# Patient Record
Sex: MALE | Race: WHITE | Employment: OTHER | URBAN - METROPOLITAN AREA
[De-identification: names, ages, dates, MRNs, and addresses within clinical notes are randomized per-mention and may not be internally consistent; named-entity substitution may affect disease eponyms.]

---

## 2017-09-26 ENCOUNTER — HOSPITAL ENCOUNTER (EMERGENCY)
Age: 52
Discharge: HOME | End: 2017-09-26
Attending: EMERGENCY MEDICINE | Admitting: EMERGENCY MEDICINE
Payer: OTHER GOVERNMENT

## 2017-09-26 VITALS
OXYGEN SATURATION: 100 % | DIASTOLIC BLOOD PRESSURE: 101 MMHG | TEMPERATURE: 98 F | BODY MASS INDEX: 22.59 KG/M2 | RESPIRATION RATE: 16 BRPM | SYSTOLIC BLOOD PRESSURE: 159 MMHG | WEIGHT: 162 LBS | HEART RATE: 72 BPM

## 2017-09-26 DIAGNOSIS — K04.7 DENTAL ABSCESS: Primary | ICD-10-CM

## 2017-09-26 DIAGNOSIS — R03.0 ELEVATED BLOOD PRESSURE READING: ICD-10-CM

## 2017-09-26 PROCEDURE — 74011250637 HC RX REV CODE- 250/637: Performed by: EMERGENCY MEDICINE

## 2017-09-26 PROCEDURE — 99283 EMERGENCY DEPT VISIT LOW MDM: CPT

## 2017-09-26 RX ORDER — AMOXICILLIN 250 MG/1
1000 CAPSULE ORAL
Status: COMPLETED | OUTPATIENT
Start: 2017-09-26 | End: 2017-09-26

## 2017-09-26 RX ORDER — HYDROCODONE BITARTRATE AND ACETAMINOPHEN 5; 325 MG/1; MG/1
TABLET ORAL
Qty: 6 TAB | Refills: 0 | Status: SHIPPED | OUTPATIENT
Start: 2017-09-26 | End: 2018-03-26

## 2017-09-26 RX ORDER — AMOXICILLIN 875 MG/1
875 TABLET, FILM COATED ORAL 2 TIMES DAILY
Qty: 14 TAB | Refills: 0 | Status: SHIPPED | OUTPATIENT
Start: 2017-09-26 | End: 2017-10-03

## 2017-09-26 RX ADMIN — AMOXICILLIN 1000 MG: 250 CAPSULE ORAL at 13:32

## 2017-09-26 NOTE — ED PROVIDER NOTES
HPI Comments: 1:13 PM Fredy Ward is a 45 y.o. male who presents to the ED c/o left lower posterior dental pain x yesterday. The patient is also complaining of associated left jaw swelling. He reports a h/o tooth decay and notes a dental appointment through the [place] in January 2018. He remarks that he does not have dental insurance. He states that he has been applying crushed Asprin to his tooth w/ some relief. The patient denies fever, chills, trouble swallowing, sore throat, NV, and additional complaints or concerns. PCP:  Miracle Silva. Cherelle Zurita NP      The history is provided by the patient. Past Medical History:   Diagnosis Date    Psychiatric disorder     depression       History reviewed. No pertinent surgical history. History reviewed. No pertinent family history. Social History     Social History    Marital status:      Spouse name: N/A    Number of children: N/A    Years of education: N/A     Occupational History    Not on file. Social History Main Topics    Smoking status: Current Every Day Smoker     Packs/day: 1.00    Smokeless tobacco: Never Used    Alcohol use Not on file    Drug use: Not on file    Sexual activity: Not on file     Other Topics Concern    Not on file     Social History Narrative         ALLERGIES: Codeine    Review of Systems   Constitutional: Negative for fever. HENT: Positive for dental problem (L lower posterior ) and facial swelling (L jaw). Negative for trouble swallowing. All other systems reviewed and are negative. Vitals:    09/26/17 1312   BP: (!) 179/111   Pulse: 76   Resp: 16   Temp: 98 °F (36.7 °C)   SpO2: 100%   Weight: 73.5 kg (162 lb)            Physical Exam   Constitutional: He is oriented to person, place, and time. He appears well-developed and well-nourished. HENT:   Head: Normocephalic and atraumatic.    Mouth/Throat:       Uvula midline  No floor of mouth crepitus  Moderate L mandibular edema, no erythema, approx 2cm circular, no discrete flutuance   Neck: Neck supple. No JVD present. Musculoskeletal: He exhibits no edema. Neurological: He is alert and oriented to person, place, and time. Skin: Skin is warm and dry. No erythema. Psychiatric: Judgment normal.        MDM  Number of Diagnoses or Management Options  Dental abscess:   Elevated blood pressure reading:   Diagnosis management comments: No signs of serious infection, ludwigs. Pt tolerating secretions, afebrile. Will give abx, main meds, advised nsiads. Discussed return precautions with pt. ED Course       Procedures    Vitals:  Patient Vitals for the past 12 hrs:   Temp Pulse Resp BP SpO2   09/26/17 1312 98 °F (36.7 °C) 76 16 (!) 179/111 100 %     Pt noted to have elevated BP. Pt is asymptomatic and was referred to PCP for follow up. Disposition:  Diagnosis:   1. Dental abscess    2. Elevated blood pressure reading        Disposition: Discharge     Follow-up Information     Follow up With Details Comments Contact Info    [facility] EMERGENCY DEPT Go to As needed, If symptoms worsen [address] [place] [address] Call in 1 day ED visit follow up [address]  [phone]           Patient's Medications   Start Taking    AMOXICILLIN (AMOXIL) 875 MG TABLET    Take 1 Tab by mouth two (2) times a day for 7 days. HYDROCODONE-ACETAMINOPHEN (NORCO) 5-325 MG PER TABLET    Take 1-2 tablets PO every 4-6 hours as needed for pain control. If over the counter ibuprofen or acetaminophen was suggested, then only take the vicodin for pain not well controlled with the over the counter medication. Continue Taking    MULTIVITAMIN (ONE A DAY) TABLET    Take 1 tablet by mouth daily. SERTRALINE (ZOLOFT) 25 MG TABLET    Take  by mouth daily.    These Medications have changed    No medications on file   Stop Taking    OXYCODONE-ACETAMINOPHEN (PERCOCET) 5-325 MG PER TABLET    Take 1 Tab by mouth every four (4) hours as needed for Pain. Max Daily Amount: 6 Tabs. Scribe [id number] Ronald Andres acting as a scribe for and in the presence of Martine Lowry DO      September 26, 2017 at 1:17 PM       Provider Attestation:      I personally performed the services described in the documentation, reviewed the documentation, as recorded by the scribe in my presence, and it accurately and completely records my words and actions.  September 26, 2017 at 1:17 PM - Martine Lowry DO

## 2017-09-26 NOTE — DISCHARGE INSTRUCTIONS
Abscessed Tooth: Care Instructions  Your Care Instructions    An abscessed tooth is a tooth that has a pocket of pus in the tissues around it. Pus forms when the body tries to fight an infection caused by bacteria. If the pus cannot drain, it forms an abscess. An abscessed tooth can cause red, swollen gums and throbbing pain, especially when you chew. You may have a bad taste in your mouth and a fever, and your jaw may swell. Damage to the tooth, untreated tooth decay, or gum disease can cause an abscessed tooth. An abscessed tooth needs to be treated by a dental professional right away. If it is not treated, the infection could spread to other parts of your body. Your dentist will give you antibiotics to stop the infection. He or she may make a hole in the tooth or cut open (nicolasa) the abscess inside your mouth so that the infection can drain, which should relieve your pain. You may need to have a root canal treatment, which tries to save your tooth by taking out the infected pulp and replacing it with a healing medicine and/or a filling. If these treatments do not work, your tooth may have to be removed. Follow-up care is a key part of your treatment and safety. Be sure to make and go to all appointments, and call your doctor if you are having problems. It's also a good idea to know your test results and keep a list of the medicines you take. How can you care for yourself at home? · Reduce pain and swelling in your face and jaw by putting ice or a cold pack on the outside of your cheek for 10 to 20 minutes at a time. Put a thin cloth between the ice and your skin. · Take pain medicines exactly as directed. ¨ If the doctor gave you a prescription medicine for pain, take it as prescribed. ¨ If you are not taking a prescription pain medicine, ask your doctor if you can take an over-the-counter medicine. · Take your antibiotics as directed. Do not stop taking them just because you feel better.  You need to take the full course of antibiotics. To prevent tooth abscess  · Brush and floss every day, and have regular dental checkups. · Eat a healthy diet, and avoid sugary foods and drinks. · Do not smoke, use e-cigarettes with nicotine, or use spit tobacco. Tobacco and nicotine slow your ability to heal. Tobacco also increases your risk for gum disease and cancer of the mouth and throat. If you need help quitting, talk to your doctor about stop-smoking programs and medicines. These can increase your chances of quitting for good. When should you call for help? Call 911 anytime you think you may need emergency care. For example, call if:  · You have trouble breathing. Call your doctor now or seek immediate medical care if:  · You have new or worse symptoms of infection, such as:  ¨ Increased pain, swelling, warmth, or redness. ¨ Red streaks leading from the area. ¨ Pus draining from the area. ¨ A fever. Watch closely for changes in your health, and be sure to contact your doctor if:  · You do not get better as expected. Where can you learn more? Go to http://josé-denia.info/. Enter U940 in the search box to learn more about \"Abscessed Tooth: Care Instructions. \"  Current as of: September 19, 2016  Content Version: 11.3  © 8505-6587 [employer]. Care instructions adapted under license by [employer] (which disclaims liability or warranty for this information). If you have questions about a medical condition or this instruction, always ask your healthcare professional. [place] [address] any warranty or liability for your use of this information. Elevated Blood Pressure: Care Instructions  Your Care Instructions    Blood pressure is a measure of how hard the blood pushes against the walls of your arteries. It's normal for blood pressure to go up and down throughout the day. But if it stays up over time, you have high blood pressure.   Two numbers tell you your blood pressure. The first number is the systolic pressure. It shows how hard the blood pushes when your heart is pumping. The second number is the diastolic pressure. It shows how hard the blood pushes between heartbeats, when your heart is relaxed and filling with blood. An ideal blood pressure in adults is less than 120/80 (say \"120 over 80\"). High blood pressure is 140/90 or higher. You have high blood pressure if your top number is 140 or higher or your bottom number is 90 or higher, or both. The main test for high blood pressure is simple, fast, and painless. To diagnose high blood pressure, your doctor will test your blood pressure at different times. After testing your blood pressure, your doctor may ask you to test it again when you are home. If you are diagnosed with high blood pressure, you can work with your doctor to make a long-term plan to manage it. Follow-up care is a key part of your treatment and safety. Be sure to make and go to all appointments, and call your doctor if you are having problems. It's also a good idea to know your test results and keep a list of the medicines you take. How can you care for yourself at home? · Do not smoke. Smoking increases your risk for heart attack and stroke. If you need help quitting, talk to your doctor about stop-smoking programs and medicines. These can increase your chances of quitting for good. · Stay at a healthy weight. · Try to limit how much sodium you eat to less than 2,300 milligrams (mg) a day. Your doctor may ask you to try to eat less than 1,500 mg a day. · Be physically active. Get at least 30 minutes of exercise on most days of the week. Walking is a good choice. You also may want to do other activities, such as running, swimming, cycling, or playing tennis or team sports. · Avoid or limit alcohol. Talk to your doctor about whether you can drink any alcohol.   · Eat plenty of fruits, vegetables, and low-fat dairy products. Eat less saturated and total fats. · Learn how to check your blood pressure at home. When should you call for help? Call your doctor now or seek immediate medical care if:  · Your blood pressure is much higher than normal (such as 180/110 or higher). · You think high blood pressure is causing symptoms such as:  ¨ Severe headache. ¨ Blurry vision. Watch closely for changes in your health, and be sure to contact your doctor if:  · You do not get better as expected. Where can you learn more? Go to http://josé-denia.info/. Enter G166 in the search box to learn more about \"Elevated Blood Pressure: Care Instructions. \"  Current as of: April 3, 2017  Content Version: 11.3  © 8963-7517 [employer]. Care instructions adapted under license by [employer] (which disclaims liability or warranty for this information). If you have questions about a medical condition or this instruction, always ask your healthcare professional. [place] [address] any warranty or liability for your use of this information.

## 2018-03-26 ENCOUNTER — HOSPITAL ENCOUNTER (EMERGENCY)
Age: 53
Discharge: HOME | End: 2018-03-26
Attending: EMERGENCY MEDICINE
Payer: OTHER GOVERNMENT

## 2018-03-26 VITALS
RESPIRATION RATE: 16 BRPM | OXYGEN SATURATION: 98 % | DIASTOLIC BLOOD PRESSURE: 98 MMHG | HEIGHT: 71 IN | HEART RATE: 72 BPM | BODY MASS INDEX: 22.4 KG/M2 | TEMPERATURE: 97.6 F | SYSTOLIC BLOOD PRESSURE: 145 MMHG | WEIGHT: 160 LBS

## 2018-03-26 DIAGNOSIS — S05.01XA ABRASION OF RIGHT CORNEA, INITIAL ENCOUNTER: ICD-10-CM

## 2018-03-26 DIAGNOSIS — T15.91XA FOREIGN BODY OF RIGHT EYE, INITIAL ENCOUNTER: Primary | ICD-10-CM

## 2018-03-26 PROCEDURE — 74011000250 HC RX REV CODE- 250: Performed by: EMERGENCY MEDICINE

## 2018-03-26 PROCEDURE — 99283 EMERGENCY DEPT VISIT LOW MDM: CPT

## 2018-03-26 RX ORDER — TRAMADOL HYDROCHLORIDE 50 MG/1
50 TABLET ORAL
Qty: 12 TAB | Refills: 0 | Status: SHIPPED | OUTPATIENT
Start: 2018-03-26

## 2018-03-26 RX ORDER — ERYTHROMYCIN 5 MG/G
OINTMENT OPHTHALMIC
Qty: 3.5 G | Refills: 0 | Status: SHIPPED | OUTPATIENT
Start: 2018-03-26 | End: 2018-04-02

## 2018-03-26 RX ORDER — PROPARACAINE HYDROCHLORIDE 5 MG/ML
1 SOLUTION/ DROPS OPHTHALMIC
Status: COMPLETED | OUTPATIENT
Start: 2018-03-26 | End: 2018-03-26

## 2018-03-26 RX ADMIN — FLUORESCEIN SODIUM 1 STRIP: 1 STRIP OPHTHALMIC at 18:31

## 2018-03-26 RX ADMIN — PROPARACAINE HYDROCHLORIDE 1 DROP: 5 SOLUTION/ DROPS OPHTHALMIC at 18:31

## 2018-03-26 NOTE — ED PROVIDER NOTES
Patient is a 45 y.o. male presenting with foreign body in eye. The history is provided by the patient. Foreign Body in Eye    This is a new problem. Episode onset: 6 hours ago. The problem occurs constantly. The problem has been gradually worsening. The right eye is affected. The injury mechanism was a foreign body (While working thinks some dust or a piece of sheet rock blew into his right eye despite wearing goggles). The pain is at a severity of 5/10. There is no history of trauma to the eye. There is no known exposure to pink eye. He does not wear contacts (Wears glasses). Associated symptoms include discharge (Watery), foreign body sensation, eye redness and pain. Pertinent negatives include no numbness, no blurred vision, no decreased vision, no double vision, no photophobia, no nausea, no vomiting, no tingling, no weakness, no itching, no fever, no blindness, no head injury and no dizziness. He has tried eye drops and water for the symptoms. The treatment provided no relief. Past Medical History:   Diagnosis Date    Psychiatric disorder     depression       History reviewed. No pertinent surgical history. History reviewed. No pertinent family history. Social History     Social History    Marital status:      Spouse name: N/A    Number of children: N/A    Years of education: N/A     Occupational History    Not on file. Social History Main Topics    Smoking status: Current Every Day Smoker     Packs/day: 1.00    Smokeless tobacco: Never Used    Alcohol use Not on file    Drug use: Not on file    Sexual activity: Not on file     Other Topics Concern    Not on file     Social History Narrative         ALLERGIES: Codeine    Review of Systems   Constitutional: Negative for chills and fever. HENT: Negative for ear pain, rhinorrhea and sore throat. Eyes: Positive for pain, discharge (Watery) and redness.  Negative for blindness, blurred vision, double vision, photophobia, itching and visual disturbance. Respiratory: Negative for cough and shortness of breath. Cardiovascular: Negative for chest pain. Gastrointestinal: Negative for abdominal pain, constipation, diarrhea, nausea and vomiting. Genitourinary: Negative for dysuria. Musculoskeletal: Negative for neck pain and neck stiffness. Skin: Negative. Negative for itching. Neurological: Negative for dizziness, tingling, weakness, light-headedness, numbness and headaches. Psychiatric/Behavioral: Negative. All other systems reviewed and are negative. Vitals:    03/26/18 1810   BP: (!) 145/98   Pulse: 72   Resp: 16   Temp: 97.6 °F (36.4 °C)   SpO2: 98%   Weight: 72.6 kg (160 lb)   Height: 5' 11\" (1.803 m)            Physical Exam   Constitutional: He is oriented to person, place, and time. He appears well-developed and well-nourished. No distress. HENT:   Head: Normocephalic and atraumatic. Right Ear: Tympanic membrane, external ear and ear canal normal.   Left Ear: Tympanic membrane, external ear and ear canal normal.   Nose: Nose normal.   Mouth/Throat: Oropharynx is clear and moist and mucous membranes are normal.   Eyes: EOM and lids are normal. Pupils are equal, round, and reactive to light. Right eye exhibits no discharge and no exudate. Left eye exhibits no chemosis, no discharge, no exudate and no hordeolum. No foreign body present in the left eye. Right conjunctiva is injected. Right conjunctiva has no hemorrhage. Left conjunctiva is not injected. Left conjunctiva has no hemorrhage. No scleral icterus. Right eye exhibits normal extraocular motion and no nystagmus. Left eye exhibits normal extraocular motion and no nystagmus. Left eye:  EOMI. Non-icteric sclera. Normal conjunctiva. No foreign bodies noted. Noted visual acuity in triage. There are no signs of cellulitis nor periorbital cellulitis. Right eye:  EOMI. Non-icteric sclera. Mildly erythematous conjunctiva.   Small FB removed with q-tip, fluorescein uptake noted. Noted visual acuity in triage. There are no signs of cellulitis nor periorbital cellulitis. Neck: Normal range of motion. Cardiovascular: Normal rate, regular rhythm, normal heart sounds and intact distal pulses. Exam reveals no gallop and no friction rub. No murmur heard. Pulmonary/Chest: Effort normal and breath sounds normal.   Abdominal: Soft. Bowel sounds are normal. There is no tenderness. Musculoskeletal: Normal range of motion. Neurological: He is alert and oriented to person, place, and time. Skin: Skin is warm and dry. He is not diaphoretic. Psychiatric: He has a normal mood and affect. His behavior is normal. Judgment and thought content normal.   Nursing note and vitals reviewed. MDM  Number of Diagnoses or Management Options  Abrasion of right cornea, initial encounter: new and requires workup  Foreign body of right eye, initial encounter: new and requires workup  Diagnosis management comments: DDx: conjunctivitis, hordeolum/stye, blepharitis, corneal abrasion, subconj hemorrhage, herpes keratitis, eye injury, FB in eye, eye injury, HA, migraine, viral illness    IMPRESSION AND MEDICAL DECISION MAKING:  Based upon the patient's presentation with noted HPI and PE, along with the work up done in the emergency department, I believe that the patient is having noted corneal abrasion. DIAGNOSIS:  1. Corneal abrasion. SPECIFIC PATIENT INSTRUCTIONS FROM THE PHYSICIAN WHO TREATED YOU IN THE ER TODAY:  1. Return if any concerns or worsening of condition(s)  2. Erythromycin eye ointment as prescribed. 3. Take the tramadol for pain not controlled by over the counter ibuprofen. 4. FOLLOW UP APPOINTMENT:  Your ophthalmologist and your primary doctor in the next 2-4 days. Pt results have been reviewed with the patient and any family present. They have been counseled regarding diagnosis, treatment, and plan.  They verbally convey understanding and agreement of the signs, symptoms, diagnosis, treatment and prognosis and additionally agrees to follow up as discussed. They also agree with the care-plan and convey that all of their questions have been answered. I have also provided discharge instructions for them that include: educational information regarding their diagnosis and treatment, and list of reasons why they would want to return to the ED prior to their follow-up appointment, should their condition change. Martín Dawson PA-C  6:58 PM        Amount and/or Complexity of Data Reviewed  Tests in the medicine section of CPT®: ordered and reviewed  Review and summarize past medical records: yes  Discuss the patient with other providers: yes    Risk of Complications, Morbidity, and/or Mortality  Presenting problems: low  Diagnostic procedures: low  Management options: low    Patient Progress  Patient progress: stable        ED Course       Eye Stain    Date/Time: 3/26/2018 6:55 PM    Performed by: PA  Supervising provider: Dr. Mayra Tong    . Foreign body removed. Residual rust ring was not observed. Dressing used: antibiotic ointment (Rx)    Corneal abrasion was present on eyelid eversion. Right eye location: 12 o'clock  Cornea is clear. Anterior chamber is clear. Patient tolerance: Patient tolerated the procedure well with no immediate complications  My total time at bedside, performing this procedure was 1-15 minutes. Diagnosis:   1. Foreign body of right eye, initial encounter    2. Abrasion of right cornea, initial encounter          Disposition: Discharge to home.      Follow-up Information     Follow up With Details Comments Contact Info    [facility] EMERGENCY DEPT  As needed, If symptoms worsen [address],  Go in 2 days  Jethro Swanson 51      Francisco Olsen MD Go in 2 days  [address] [place] [address] Patient's Medications   Start Taking    ERYTHROMYCIN (ILOTYCIN) OPHTHALMIC OINTMENT    Apply half-inch ribbon to the inside of the lower eyelid of the affected eye 3-4 times a day for 7 days. TRAMADOL (ULTRAM) 50 MG TABLET    Take 1 Tab by mouth every six (6) hours as needed for Pain. Max Daily Amount: 200 mg. Continue Taking    LISINOPRIL PO    Take  by mouth. MULTIVITAMIN (ONE A DAY) TABLET    Take 1 tablet by mouth daily. SERTRALINE (ZOLOFT) 25 MG TABLET    Take  by mouth daily. SIMVASTATIN PO    Take  by mouth. These Medications have changed    No medications on file   Stop Taking    HYDROCODONE-ACETAMINOPHEN (NORCO) 5-325 MG PER TABLET    Take 1-2 tablets PO every 4-6 hours as needed for pain control. If over the counter ibuprofen or acetaminophen was suggested, then only take the vicodin for pain not well controlled with the over the counter medication.

## 2018-03-26 NOTE — ED NOTES
I performed a brief evaluation, including history and physical, of the patient here in triage and I have determined that pt will need further treatment and evaluation from the main side ER physician. I have placed initial orders to help in expediting patients care. March 26, 2018 at 6:10 PM - LUANNE Little        There were no vitals taken for this visit.

## 2018-03-26 NOTE — DISCHARGE INSTRUCTIONS
Object in the Eye: Care Instructions  Your Care Instructions  It is common for a speck of dirt or a small object, such as an eyelash or an insect, to get in the eye. Usually your tears wash the object out. But the speck can scratch the surface of the eye (cornea). If the eye surface is scratched, it can feel as if something is still in the eye. Most surface scratches are minor and heal on their own in a day or two. If the object was still in your eye, your doctor probably removed it during your exam. Sometimes these objects become stuck deep in the eye and require more treatment. For instance, a metal object may leave a rust ring. Follow-up care is a key part of your treatment and safety. Be sure to make and go to all appointments, and call your doctor if you are having problems. It's also a good idea to know your test results and keep a list of the medicines you take. How can you care for yourself at home? · The doctor probably used medicine to numb your eye. When it wears off in 30 to 60 minutes, your eye pain may come back. Take over-the-counter pain medicine, such as acetaminophen (Tylenol), ibuprofen (Advil, Motrin), or naproxen (Aleve), as needed. Read and follow all instructions on the label. · Do not take two or more pain medicines at the same time unless the doctor told you to. Many pain medicines have acetaminophen, which is Tylenol. Too much acetaminophen (Tylenol) can be harmful. · If your doctor prescribed antibiotics, take them as directed. Do not stop taking them just because you feel better. You need to take the full course of antibiotics. · The doctor may have put a patch over your injured eye. If so, keep your eye closed under the patch. This will make your eye feel better. Do not remove the patch until your doctor has told you to. · If you do not have a patch, keep your hurt eye closed to reduce pain. · Wash your hands before touching your eye. · Do not rub your injured eye.  Rubbing can make it worse. · Use the prescribed eyedrops or ointment as directed. Be sure the dropper or bottle tip is clean. · To put in eyedrops or ointment:  ¨ Tilt your head back, and pull your lower eyelid down with one finger. ¨ Drop or squirt the medicine inside the lower lid. ¨ Close your eye for 30 to 60 seconds to let the drops or ointment move around. ¨ Do not touch the ointment or dropper tip to your eyelashes or any other surface. · Do not use a contact lens in your hurt eye until your doctor says you can. Also, do not wear eye makeup until your eye heals. · Do not drive if you are wearing an eye patch. You cannot  distances well. · For the first 24 to 48 hours, limit reading and other tasks that require a lot of eye movement. · Bright light may hurt. It may help to wear dark glasses. · To prevent eye injuries in the future, wear safety glasses or goggles when you work with machines or tools, mow the lawn, or ride a bike or motorcycle. When should you call for help? Call 911 anytime you think you may need emergency care. For example, call if:  ? · You suddenly cannot see or can barely see. ?Call your doctor now or seek immediate medical care if:  ? · You have signs of infection in the eye, such as:  ¨ Pus or thick discharge coming from the eye. ¨ Redness or swelling around the eye. ¨ A fever. ? · You have new or increasing eye pain. ? · It feels like sand is in your eye when you blink. ? Watch closely for changes in your health, and be sure to contact your doctor if:  ? · You are not getting better after 1 day (24 hours). Where can you learn more? Go to http://josé-denia.info/. Enter V480 in the search box to learn more about \"Object in the Eye: Care Instructions. \"  Current as of: March 20, 2017  Content Version: 11.4  © 0026-8350 [employer].  Care instructions adapted under license by [employer] (which disclaims liability or warranty for this information). If you have questions about a medical condition or this instruction, always ask your healthcare professional. [address] any warranty or liability for your use of this information. Corneal Scratches: Care Instructions  Your Care Instructions    The cornea is the clear surface that covers the front of the eye. When a speck of dirt, a wood chip, an insect, or another object flies into your eye, it can cause a painful scratch on the cornea. Wearing contact lenses too long or rubbing your eyes can also scratch the cornea. Small scratches usually heal in a day or two. Deeper scratches may take longer. If you have had a foreign object removed from your eye or you have a corneal scratch, you will need to watch for infection and vision problems while your eye heals. Follow-up care is a key part of your treatment and safety. Be sure to make and go to all appointments, and call your doctor if you are having problems. It's also a good idea to know your test results and keep a list of the medicines you take. How can you care for yourself at home? · The doctor probably used a medicine during your exam to numb your eye. When it wears off in 30 to 60 minutes, your eye pain may come back. Take pain medicines exactly as directed. ¨ If the doctor gave you a prescription medicine for pain, take it as prescribed. ¨ If you are not taking a prescription pain medicine, ask your doctor if you can take an over-the-counter medicine. ¨ Do not take two or more pain medicines at the same time unless the doctor told you to. Many pain medicines have acetaminophen, which is Tylenol. Too much acetaminophen (Tylenol) can be harmful. · Do not rub your injured eye. Rubbing can make it worse. · Use the prescribed eyedrops or ointment as directed. Be sure the dropper or bottle tip is clean.  To put in eyedrops or ointment:  ¨ Tilt your head back, and pull your lower eyelid down with one finger. ¨ Drop or squirt the medicine inside the lower lid. ¨ Close your eye for 30 to 60 seconds to let the drops or ointment move around. ¨ Do not touch the ointment or dropper tip to your eyelashes or any other surface. · Do not use your contact lens in your hurt eye until your doctor says you can. Also, do not wear eye makeup until your eye has healed. · Do not drive if you have blurred vision. · Bright light may hurt. Sunglasses can help. · To prevent eye injuries in the future, wear safety glasses or goggles when you work with machines or tools, mow the lawn, or ride a bike or motorcycle. When should you call for help? Call your doctor now or seek immediate medical care if:  ? · You have signs of an eye infection, such as:  ¨ Pus or thick discharge coming from the eye. ¨ Redness or swelling around the eye. ¨ A fever. ? · You have new or worse eye pain. ? · You have vision changes. ? · It feels like there is something in your eye. ? · Light hurts your eye. ? Watch closely for changes in your health, and be sure to contact your doctor if:  ? · You do not get better as expected. Where can you learn more? Go to http://josé-denia.info/. Enter U972 in the search box to learn more about \"Corneal Scratches: Care Instructions. \"  Current as of: March 3, 2017  Content Version: 11.4  © 1141-6143 [employer]. Care instructions adapted under license by [employer] (which disclaims liability or warranty for this information). If you have questions about a medical condition or this instruction, always ask your healthcare professional. [place] [address] any warranty or liability for your use of this information.

## 2022-03-24 ENCOUNTER — OFFICE VISIT (OUTPATIENT)
Dept: INTERNAL MEDICINE CLINIC | Age: 57
End: 2022-03-24
Payer: OTHER GOVERNMENT

## 2022-03-24 VITALS
DIASTOLIC BLOOD PRESSURE: 78 MMHG | WEIGHT: 171.8 LBS | BODY MASS INDEX: 24.05 KG/M2 | HEART RATE: 58 BPM | OXYGEN SATURATION: 98 % | HEIGHT: 71 IN | SYSTOLIC BLOOD PRESSURE: 132 MMHG

## 2022-03-24 DIAGNOSIS — I10 PRIMARY HYPERTENSION: ICD-10-CM

## 2022-03-24 DIAGNOSIS — F17.200 NICOTINE DEPENDENCE WITH CURRENT USE: ICD-10-CM

## 2022-03-24 DIAGNOSIS — F41.1 GAD (GENERALIZED ANXIETY DISORDER): Primary | ICD-10-CM

## 2022-03-24 DIAGNOSIS — F52.8 PSYCHOSEXUAL DYSFUNCTION WITH INHIBITED SEXUAL EXCITEMENT: ICD-10-CM

## 2022-03-24 DIAGNOSIS — R73.02 IMPAIRED GLUCOSE TOLERANCE: ICD-10-CM

## 2022-03-24 DIAGNOSIS — Z12.11 COLON CANCER SCREENING: ICD-10-CM

## 2022-03-24 DIAGNOSIS — E78.2 MIXED HYPERLIPIDEMIA: ICD-10-CM

## 2022-03-24 DIAGNOSIS — M47.816 SPONDYLOSIS OF LUMBAR REGION WITHOUT MYELOPATHY OR RADICULOPATHY: ICD-10-CM

## 2022-03-24 PROBLEM — N52.9 ERECTILE DYSFUNCTION: Status: ACTIVE | Noted: 2021-03-01

## 2022-03-24 PROBLEM — E78.5 HYPERLIPIDEMIA: Status: ACTIVE | Noted: 2021-10-26

## 2022-03-24 PROCEDURE — 99204 OFFICE O/P NEW MOD 45 MIN: CPT | Performed by: INTERNAL MEDICINE

## 2022-03-24 RX ORDER — TADALAFIL 20 MG/1
20 TABLET ORAL PRN
Qty: 30 TABLET | Refills: 3 | Status: SHIPPED | OUTPATIENT
Start: 2022-03-24

## 2022-03-24 RX ORDER — TADALAFIL 20 MG/1
20 TABLET ORAL PRN
COMMUNITY
End: 2022-03-24 | Stop reason: SDUPTHER

## 2022-03-24 RX ORDER — MELOXICAM 15 MG/1
15 TABLET ORAL DAILY
Qty: 90 TABLET | Refills: 1 | Status: SHIPPED | OUTPATIENT
Start: 2022-03-24 | End: 2022-08-18

## 2022-03-24 RX ORDER — LISINOPRIL 10 MG/1
10 TABLET ORAL DAILY
Qty: 90 TABLET | Refills: 1 | Status: SHIPPED | OUTPATIENT
Start: 2022-03-24 | End: 2022-09-28

## 2022-03-24 RX ORDER — ATORVASTATIN CALCIUM 20 MG/1
20 TABLET, FILM COATED ORAL DAILY
COMMUNITY
End: 2022-03-24 | Stop reason: SDUPTHER

## 2022-03-24 RX ORDER — LISINOPRIL 10 MG/1
10 TABLET ORAL DAILY
COMMUNITY
End: 2022-03-24 | Stop reason: SDUPTHER

## 2022-03-24 RX ORDER — ATORVASTATIN CALCIUM 20 MG/1
20 TABLET, FILM COATED ORAL DAILY
Qty: 90 TABLET | Refills: 1 | Status: SHIPPED | OUTPATIENT
Start: 2022-03-24 | End: 2022-09-28

## 2022-03-24 RX ORDER — MELOXICAM 15 MG/1
15 TABLET ORAL DAILY
COMMUNITY
End: 2022-03-24 | Stop reason: SDUPTHER

## 2022-03-24 SDOH — ECONOMIC STABILITY: FOOD INSECURITY: WITHIN THE PAST 12 MONTHS, YOU WORRIED THAT YOUR FOOD WOULD RUN OUT BEFORE YOU GOT MONEY TO BUY MORE.: NEVER TRUE

## 2022-03-24 SDOH — ECONOMIC STABILITY: FOOD INSECURITY: WITHIN THE PAST 12 MONTHS, THE FOOD YOU BOUGHT JUST DIDN'T LAST AND YOU DIDN'T HAVE MONEY TO GET MORE.: NEVER TRUE

## 2022-03-24 ASSESSMENT — ENCOUNTER SYMPTOMS
CHEST TIGHTNESS: 0
CONSTIPATION: 0
SHORTNESS OF BREATH: 0
ABDOMINAL PAIN: 0
NAUSEA: 0
WHEEZING: 0
COLOR CHANGE: 0
VOMITING: 0
COUGH: 0
BACK PAIN: 1
SORE THROAT: 0

## 2022-03-24 ASSESSMENT — PATIENT HEALTH QUESTIONNAIRE - PHQ9
SUM OF ALL RESPONSES TO PHQ QUESTIONS 1-9: 0
SUM OF ALL RESPONSES TO PHQ QUESTIONS 1-9: 0
SUM OF ALL RESPONSES TO PHQ9 QUESTIONS 1 & 2: 0
SUM OF ALL RESPONSES TO PHQ QUESTIONS 1-9: 0
1. LITTLE INTEREST OR PLEASURE IN DOING THINGS: 0
2. FEELING DOWN, DEPRESSED OR HOPELESS: 0
SUM OF ALL RESPONSES TO PHQ QUESTIONS 1-9: 0

## 2022-03-24 ASSESSMENT — SOCIAL DETERMINANTS OF HEALTH (SDOH): HOW HARD IS IT FOR YOU TO PAY FOR THE VERY BASICS LIKE FOOD, HOUSING, MEDICAL CARE, AND HEATING?: NOT HARD AT ALL

## 2022-03-24 NOTE — ASSESSMENT & PLAN NOTE
Advised patient since pain is not constant he does not need to take Mobic on a daily basis and can switch to as needed.   Recommended to maintain regular level of physical activity to prevent relapse

## 2022-03-24 NOTE — ASSESSMENT & PLAN NOTE
Symptoms stable, well controlled and manageable on current dose of Zoloft 50 mg, will refill and continue the same, reevaluate in 6-month.   Depression screen is negative

## 2022-03-24 NOTE — ASSESSMENT & PLAN NOTE
Patient had lab work done recently where he was told he is prediabetic, no records available, advised to adhere to low carbohydrate diet along with regular exercise, will update lab work next visit in 6 months

## 2022-03-24 NOTE — ASSESSMENT & PLAN NOTE
Patient counseled at length regarding need for smoking cessation and completing COVID vaccine.   He is not ready to quit yet but states will think about it, currently smokes 1 pack a day

## 2022-03-24 NOTE — ASSESSMENT & PLAN NOTE
Blood pressure stable and well-controlled on current dose of lisinopril, will refill and continue the same, update labs next visit, reinforced recommendations for salt restriction, need for smoking cessation, cut back on caffeine and alcohol intake and maintain regular level of physical activity as other means to help control blood pressure

## 2022-03-24 NOTE — ASSESSMENT & PLAN NOTE
Healthy diet and regular exercise recommendations made to patient, we will continue current dose of atorvastatin, update lab work next visit in 6 months when due for yearly physical

## 2022-03-24 NOTE — PROGRESS NOTES
regular level of physical activity to prevent relapse  Orders:  -     meloxicam (MOBIC) 15 MG tablet; Take 1 tablet by mouth daily, Disp-90 tablet, R-1Normal  7. Psychosexual dysfunction with inhibited sexual excitement  -     tadalafil (CIALIS) 20 MG tablet; Take 1 tablet by mouth as needed for Erectile Dysfunction, Disp-30 tablet, R-3Normal  8. Colon cancer screening  -     [facility] - , Tano Guerrero MD, Gastroenterology, [facility]      Return in about 6 months (around 9/24/2022) for annual physical.     SUBJECTIVE  HPI:   Patient here to establish new patient office visits, recently moved from [place] to be closer to mother-in-law who is older with health issue and needs family support  He is a 67-year-old male with known history of hypertension, hyperlipidemia, and anxiety and recently impaired fasting glucose. Most of his problems are stable and well-controlled on current medication  He works in construction, currently looking for a job, he is an everyday smoker who smokes a pack a day for long number of years      Review of Systems   Constitutional: Negative for activity change, appetite change, fatigue and unexpected weight change. HENT: Negative for congestion, hearing loss, mouth sores and sore throat. Eyes: Negative for visual disturbance. Respiratory: Negative for cough, chest tightness, shortness of breath and wheezing. Cardiovascular: Negative for chest pain, palpitations and leg swelling. Gastrointestinal: Negative for abdominal pain, constipation, nausea and vomiting. Endocrine: Negative for cold intolerance and heat intolerance. Genitourinary: Negative for difficulty urinating, dysuria, frequency, hematuria and urgency. Musculoskeletal: Positive for back pain. Negative for arthralgias, gait problem, joint swelling and neck pain. Skin: Negative for color change. Allergic/Immunologic: Negative for environmental allergies and immunocompromised state.    Neurological: Negative for dizziness, light-headedness and headaches. Psychiatric/Behavioral: Negative for behavioral problems and dysphoric mood. The patient is not nervous/anxious. OBJECTIVE:    /78   Pulse 58   Ht 5' 11\" (1.803 m)   Wt 171 lb 12.8 oz (77.9 kg)   SpO2 98%   BMI 23.96 kg/m²    Physical Exam      Electronically signed by Rogelio Moran MD on 3/24/2022 at 1:50 PM.    This dictation was generated by voice recognition computer software. Although all attempts are made to edit the dictation for accuracy, there may be errors in the transcription that are not intended.

## 2022-05-31 ENCOUNTER — OFFICE VISIT (OUTPATIENT)
Dept: INTERNAL MEDICINE CLINIC | Age: 57
End: 2022-05-31
Payer: OTHER GOVERNMENT

## 2022-05-31 VITALS
SYSTOLIC BLOOD PRESSURE: 130 MMHG | WEIGHT: 166 LBS | HEART RATE: 70 BPM | BODY MASS INDEX: 23.24 KG/M2 | OXYGEN SATURATION: 97 % | HEIGHT: 71 IN | DIASTOLIC BLOOD PRESSURE: 72 MMHG

## 2022-05-31 DIAGNOSIS — M79.674 GREAT TOE PAIN, RIGHT: ICD-10-CM

## 2022-05-31 DIAGNOSIS — M19.071 ARTHRITIS OF FIRST METATARSOPHALANGEAL (MTP) JOINT OF RIGHT FOOT: Primary | ICD-10-CM

## 2022-05-31 DIAGNOSIS — I10 PRIMARY HYPERTENSION: ICD-10-CM

## 2022-05-31 LAB
A/G RATIO: 2.4 (ref 1.1–2.2)
ALBUMIN SERPL-MCNC: 4.8 G/DL (ref 3.4–5)
ALP BLD-CCNC: 70 U/L (ref 40–129)
ALT SERPL-CCNC: 19 U/L (ref 10–40)
ANION GAP SERPL CALCULATED.3IONS-SCNC: 15 MMOL/L (ref 3–16)
AST SERPL-CCNC: 20 U/L (ref 15–37)
BILIRUB SERPL-MCNC: 0.4 MG/DL (ref 0–1)
BUN BLDV-MCNC: 20 MG/DL (ref 7–20)
CALCIUM SERPL-MCNC: 9.9 MG/DL (ref 8.3–10.6)
CHLORIDE BLD-SCNC: 104 MMOL/L (ref 99–110)
CO2: 23 MMOL/L (ref 21–32)
CREAT SERPL-MCNC: 0.8 MG/DL (ref 0.9–1.3)
GFR AFRICAN AMERICAN: >60
GFR NON-AFRICAN AMERICAN: >60
GLUCOSE BLD-MCNC: 73 MG/DL (ref 70–99)
HCT VFR BLD CALC: 40.6 % (ref 40.5–52.5)
HEMOGLOBIN: 13.5 G/DL (ref 13.5–17.5)
MCH RBC QN AUTO: 31.3 PG (ref 26–34)
MCHC RBC AUTO-ENTMCNC: 33.2 G/DL (ref 31–36)
MCV RBC AUTO: 94.1 FL (ref 80–100)
PDW BLD-RTO: 13.4 % (ref 12.4–15.4)
PLATELET # BLD: 189 K/UL (ref 135–450)
PMV BLD AUTO: 9.2 FL (ref 5–10.5)
POTASSIUM SERPL-SCNC: 4.7 MMOL/L (ref 3.5–5.1)
RBC # BLD: 4.32 M/UL (ref 4.2–5.9)
SEDIMENTATION RATE, ERYTHROCYTE: 6 MM/HR (ref 0–20)
SODIUM BLD-SCNC: 142 MMOL/L (ref 136–145)
TOTAL PROTEIN: 6.8 G/DL (ref 6.4–8.2)
URIC ACID, SERUM: 5.7 MG/DL (ref 3.5–7.2)
WBC # BLD: 9.1 K/UL (ref 4–11)

## 2022-05-31 PROCEDURE — 99213 OFFICE O/P EST LOW 20 MIN: CPT | Performed by: INTERNAL MEDICINE

## 2022-05-31 RX ORDER — METHYLPREDNISOLONE 4 MG/1
TABLET ORAL
Qty: 1 KIT | Refills: 0 | Status: SHIPPED | OUTPATIENT
Start: 2022-05-31 | End: 2022-06-06

## 2022-05-31 ASSESSMENT — ENCOUNTER SYMPTOMS
WHEEZING: 0
NAUSEA: 0
COLOR CHANGE: 0
BACK PAIN: 0
CONSTIPATION: 0
COUGH: 0
CHEST TIGHTNESS: 0
SHORTNESS OF BREATH: 0
SORE THROAT: 0
ABDOMINAL PAIN: 0
VOMITING: 0

## 2022-05-31 NOTE — PROGRESS NOTES
ASSESSMENT/PLAN:  1. Arthritis of first metatarsophalangeal (MTP) joint of right foot  Assessment & Plan:   Although symptoms do not appear to be consistent with acute gouty flareup advised patient will still need to rule out gout as an underlying etiology however primary osteoarthritis of the first metatarsal joint seems to be more likely given symptoms triggered by prolonged standing and worsening since he started his current job. We will do Medrol dose pack to help with pain management, check x-rays along with uric acid and ESR levels, further recommendations will be pending results. Encouraged to wear comfortable shoes with good arch support and avoid prolonged standing if able to  Orders:  -     methylPREDNISolone (MEDROL DOSEPACK) 4 MG tablet; Take by mouth., Disp-1 kit, R-0Normal  2. Great toe pain, right  -     Comprehensive Metabolic Panel; Future  -     Sedimentation Rate; Future  -     Uric Acid; Future  -     XR FOOT RIGHT (2 VIEWS); Future  -     methylPREDNISolone (MEDROL DOSEPACK) 4 MG tablet; Take by mouth., Disp-1 kit, R-0Normal  3. Primary hypertension  Assessment & Plan:   Blood pressure stable and well-controlled on current medications, continue same and follow-up as scheduled in 4 months  Orders:  -     CBC; Future  -     Comprehensive Metabolic Panel; Future      Return for as scheduled. SUBJECTIVE  HPI:   Patient seen today for acute visit complaining of pain and right big toe  He is denying history of trauma, denies prior history of gout, he noticed swelling in the joint but no redness or warmth. The pain has been going on for a few weeks but got really bad over the past 2 months, he believes ever since he moved to [place] and having a job where he needs to be on his feet all day long the pain got worse. He tried over-the-counter ibuprofen with limited relief of the symptoms.   He feels better on his off work days      Review of Systems   Constitutional: Negative for activity change, appetite change and fatigue. HENT: Negative for congestion, hearing loss, mouth sores and sore throat. Respiratory: Negative for cough, chest tightness, shortness of breath and wheezing. Cardiovascular: Negative for chest pain, palpitations and leg swelling. Gastrointestinal: Negative for abdominal pain, constipation, nausea and vomiting. Genitourinary: Negative for difficulty urinating, dysuria, frequency, hematuria and urgency. Musculoskeletal: Positive for arthralgias. Negative for back pain, gait problem and joint swelling. Skin: Negative for color change. Allergic/Immunologic: Negative for environmental allergies and immunocompromised state. Neurological: Negative for dizziness, light-headedness and headaches. Psychiatric/Behavioral: Negative for behavioral problems and dysphoric mood. The patient is nervous/anxious. OBJECTIVE:    /72   Pulse 70   Ht 5' 11\" (1.803 m)   Wt 166 lb (75.3 kg)   SpO2 97%   BMI 23.15 kg/m²    Physical Exam  Constitutional:       General: He is not in acute distress. Appearance: Normal appearance. He is normal weight. He is not toxic-appearing. HENT:      Head: Normocephalic. Eyes:      Conjunctiva/sclera: Conjunctivae normal.   Cardiovascular:      Rate and Rhythm: Normal rate. Heart sounds: Normal heart sounds. Pulmonary:      Effort: Pulmonary effort is normal. No respiratory distress. Abdominal:      Palpations: Abdomen is soft. Musculoskeletal:         General: Normal range of motion. Skin:     General: Skin is warm and dry. Neurological:      General: No focal deficit present. Mental Status: He is alert. Cranial Nerves: No cranial nerve deficit. Psychiatric:         Mood and Affect: Mood normal.           Electronically signed by Lois Holly MD on 5/31/2022 at 1:19 PM.    This dictation was generated by voice recognition computer software.   Although all attempts are made to edit the dictation for accuracy, there may be errors in the transcription that are not intended.

## 2022-05-31 NOTE — ASSESSMENT & PLAN NOTE
Although symptoms do not appear to be consistent with acute gouty flareup advised patient will still need to rule out gout as an underlying etiology however primary osteoarthritis of the first metatarsal joint seems to be more likely given symptoms triggered by prolonged standing and worsening since he started his current job. We will do Medrol dose pack to help with pain management, check x-rays along with uric acid and ESR levels, further recommendations will be pending results.   Encouraged to wear comfortable shoes with good arch support and avoid prolonged standing if able to

## 2022-05-31 NOTE — ASSESSMENT & PLAN NOTE
Blood pressure stable and well-controlled on current medications, continue same and follow-up as scheduled in 4 months

## 2022-06-22 ENCOUNTER — HOSPITAL ENCOUNTER (OUTPATIENT)
Dept: GENERAL RADIOLOGY | Age: 57
Discharge: HOME | End: 2022-06-22
Payer: OTHER GOVERNMENT

## 2022-06-22 ENCOUNTER — HOSPITAL ENCOUNTER (OUTPATIENT)
Age: 57
Discharge: HOME | End: 2022-06-22
Payer: OTHER GOVERNMENT

## 2022-06-22 DIAGNOSIS — M79.674 GREAT TOE PAIN, RIGHT: ICD-10-CM

## 2022-06-22 PROCEDURE — 73620 X-RAY EXAM OF FOOT: CPT

## 2022-08-17 DIAGNOSIS — M47.816 SPONDYLOSIS OF LUMBAR REGION WITHOUT MYELOPATHY OR RADICULOPATHY: ICD-10-CM

## 2022-08-17 NOTE — TELEPHONE ENCOUNTER
Medication:   Requested Prescriptions     Pending Prescriptions Disp Refills    meloxicam (MOBIC) 15 MG tablet [Pharmacy Med Name: MELOXICAM 15MG TABLETS] 90 tablet 1     Sig: TAKE 1 TABLET BY MOUTH DAILY       Last Filled:  3/24/22    Patient Phone Number: [phone] (home)     Last appt: 5/31/2022   Next appt: 9/26/2022

## 2022-08-18 RX ORDER — MELOXICAM 15 MG/1
15 TABLET ORAL DAILY
Qty: 90 TABLET | Refills: 1 | Status: SHIPPED | OUTPATIENT
Start: 2022-08-18

## 2022-09-28 DIAGNOSIS — I10 PRIMARY HYPERTENSION: ICD-10-CM

## 2022-09-28 DIAGNOSIS — E78.2 MIXED HYPERLIPIDEMIA: ICD-10-CM

## 2022-09-28 DIAGNOSIS — F41.1 GAD (GENERALIZED ANXIETY DISORDER): ICD-10-CM

## 2022-09-28 RX ORDER — LISINOPRIL 10 MG/1
10 TABLET ORAL DAILY
Qty: 30 TABLET | Refills: 0 | Status: SHIPPED | OUTPATIENT
Start: 2022-09-28 | End: 2022-10-31

## 2022-09-28 RX ORDER — ATORVASTATIN CALCIUM 20 MG/1
20 TABLET, FILM COATED ORAL DAILY
Qty: 90 TABLET | OUTPATIENT
Start: 2022-09-28

## 2022-09-28 RX ORDER — LISINOPRIL 10 MG/1
10 TABLET ORAL DAILY
Qty: 90 TABLET | OUTPATIENT
Start: 2022-09-28

## 2022-09-28 RX ORDER — ATORVASTATIN CALCIUM 20 MG/1
20 TABLET, FILM COATED ORAL DAILY
Qty: 30 TABLET | Refills: 0 | Status: SHIPPED | OUTPATIENT
Start: 2022-09-28 | End: 2022-10-31

## 2022-09-28 NOTE — TELEPHONE ENCOUNTER
Patient no showed for his follow-up appointment, will give 30-day supply however no more refills until follow-up is completed

## 2022-10-29 DIAGNOSIS — I10 PRIMARY HYPERTENSION: ICD-10-CM

## 2022-10-29 DIAGNOSIS — F41.1 GAD (GENERALIZED ANXIETY DISORDER): ICD-10-CM

## 2022-10-29 DIAGNOSIS — E78.2 MIXED HYPERLIPIDEMIA: ICD-10-CM

## 2022-10-31 DIAGNOSIS — I10 PRIMARY HYPERTENSION: ICD-10-CM

## 2022-10-31 DIAGNOSIS — E78.2 MIXED HYPERLIPIDEMIA: ICD-10-CM

## 2022-10-31 RX ORDER — ATORVASTATIN CALCIUM 20 MG/1
20 TABLET, FILM COATED ORAL DAILY
Qty: 30 TABLET | Refills: 0 | Status: SHIPPED | OUTPATIENT
Start: 2022-10-31

## 2022-10-31 RX ORDER — LISINOPRIL 10 MG/1
10 TABLET ORAL DAILY
Qty: 30 TABLET | Refills: 0 | Status: SHIPPED | OUTPATIENT
Start: 2022-10-31

## 2022-10-31 RX ORDER — LISINOPRIL 10 MG/1
10 TABLET ORAL DAILY
Qty: 90 TABLET | OUTPATIENT
Start: 2022-10-31

## 2022-10-31 RX ORDER — ATORVASTATIN CALCIUM 20 MG/1
20 TABLET, FILM COATED ORAL DAILY
Qty: 90 TABLET | OUTPATIENT
Start: 2022-10-31

## 2022-10-31 NOTE — TELEPHONE ENCOUNTER
Recent Visits  Date Type Provider Dept   05/31/22 Office Visit MD Jose Rafael Gandhi   03/24/22 Office Visit MD Jose Rafael Gandhi   Showing recent visits within past 540 days with a meds authorizing provider and meeting all other requirements  Future Appointments  No visits were found meeting these conditions.   Showing future appointments within next 150 days with a meds authorizing provider and meeting all other requirements     5/31/2022

## 2022-12-03 DIAGNOSIS — I10 PRIMARY HYPERTENSION: ICD-10-CM

## 2022-12-03 DIAGNOSIS — E78.2 MIXED HYPERLIPIDEMIA: ICD-10-CM

## 2022-12-05 RX ORDER — ATORVASTATIN CALCIUM 20 MG/1
20 TABLET, FILM COATED ORAL DAILY
Qty: 30 TABLET | Refills: 0 | OUTPATIENT
Start: 2022-12-05

## 2022-12-05 RX ORDER — LISINOPRIL 10 MG/1
10 TABLET ORAL DAILY
Qty: 30 TABLET | Refills: 0 | OUTPATIENT
Start: 2022-12-05

## 2022-12-13 ENCOUNTER — OFFICE VISIT (OUTPATIENT)
Dept: INTERNAL MEDICINE CLINIC | Age: 57
End: 2022-12-13
Payer: OTHER GOVERNMENT

## 2022-12-13 VITALS
OXYGEN SATURATION: 98 % | HEIGHT: 71 IN | DIASTOLIC BLOOD PRESSURE: 72 MMHG | WEIGHT: 166 LBS | BODY MASS INDEX: 23.24 KG/M2 | HEART RATE: 73 BPM | SYSTOLIC BLOOD PRESSURE: 120 MMHG

## 2022-12-13 DIAGNOSIS — Z23 FLU VACCINE NEED: ICD-10-CM

## 2022-12-13 DIAGNOSIS — I10 PRIMARY HYPERTENSION: ICD-10-CM

## 2022-12-13 DIAGNOSIS — M47.816 SPONDYLOSIS OF LUMBAR REGION WITHOUT MYELOPATHY OR RADICULOPATHY: ICD-10-CM

## 2022-12-13 DIAGNOSIS — Z12.11 COLON CANCER SCREENING: ICD-10-CM

## 2022-12-13 DIAGNOSIS — N52.9 ERECTILE DYSFUNCTION, UNSPECIFIED ERECTILE DYSFUNCTION TYPE: ICD-10-CM

## 2022-12-13 DIAGNOSIS — Z87.891 PERSONAL HISTORY OF TOBACCO USE: ICD-10-CM

## 2022-12-13 DIAGNOSIS — F41.1 GAD (GENERALIZED ANXIETY DISORDER): ICD-10-CM

## 2022-12-13 DIAGNOSIS — Z00.00 ANNUAL PHYSICAL EXAM: Primary | ICD-10-CM

## 2022-12-13 DIAGNOSIS — Z12.5 PROSTATE CANCER SCREENING: ICD-10-CM

## 2022-12-13 DIAGNOSIS — E78.2 MIXED HYPERLIPIDEMIA: ICD-10-CM

## 2022-12-13 DIAGNOSIS — R73.02 IMPAIRED GLUCOSE TOLERANCE: ICD-10-CM

## 2022-12-13 PROBLEM — R73.03 PREDIABETES: Status: RESOLVED | Noted: 2022-12-13 | Resolved: 2022-12-13

## 2022-12-13 PROBLEM — R73.03 PREDIABETES: Status: ACTIVE | Noted: 2022-12-13

## 2022-12-13 PROCEDURE — G0296 VISIT TO DETERM LDCT ELIG: HCPCS | Performed by: INTERNAL MEDICINE

## 2022-12-13 PROCEDURE — 90471 IMMUNIZATION ADMIN: CPT | Performed by: INTERNAL MEDICINE

## 2022-12-13 PROCEDURE — 90674 CCIIV4 VAC NO PRSV 0.5 ML IM: CPT | Performed by: INTERNAL MEDICINE

## 2022-12-13 PROCEDURE — 99396 PREV VISIT EST AGE 40-64: CPT | Performed by: INTERNAL MEDICINE

## 2022-12-13 PROCEDURE — 3074F SYST BP LT 130 MM HG: CPT | Performed by: INTERNAL MEDICINE

## 2022-12-13 PROCEDURE — 3078F DIAST BP <80 MM HG: CPT | Performed by: INTERNAL MEDICINE

## 2022-12-13 RX ORDER — LISINOPRIL 10 MG/1
10 TABLET ORAL DAILY
Qty: 90 TABLET | Refills: 1 | Status: SHIPPED | OUTPATIENT
Start: 2022-12-13

## 2022-12-13 RX ORDER — ATORVASTATIN CALCIUM 20 MG/1
20 TABLET, FILM COATED ORAL DAILY
Qty: 90 TABLET | Refills: 1 | Status: SHIPPED | OUTPATIENT
Start: 2022-12-13

## 2022-12-13 RX ORDER — TADALAFIL 20 MG/1
20 TABLET ORAL DAILY PRN
Qty: 30 TABLET | Refills: 5 | Status: SHIPPED | OUTPATIENT
Start: 2022-12-13

## 2022-12-13 RX ORDER — MELOXICAM 15 MG/1
15 TABLET ORAL DAILY
Qty: 90 TABLET | Refills: 1 | Status: SHIPPED | OUTPATIENT
Start: 2022-12-13

## 2022-12-13 ASSESSMENT — ENCOUNTER SYMPTOMS
NAUSEA: 0
WHEEZING: 0
ABDOMINAL PAIN: 0
VOMITING: 0
COUGH: 0
DIARRHEA: 0
CHEST TIGHTNESS: 0
SORE THROAT: 0
BACK PAIN: 0
BLOOD IN STOOL: 0
SINUS PAIN: 0
SHORTNESS OF BREATH: 0
COLOR CHANGE: 0
CONSTIPATION: 0

## 2022-12-13 NOTE — PATIENT INSTRUCTIONS

## 2022-12-13 NOTE — ASSESSMENT & PLAN NOTE
Age related health maintenance and immunization recommendations reviewed and d/w pt, will complete flu vac today and pneumonia vac next visit. Labs ordered, healthy diet and regular exercise recommendations reviewed and d/w pt. Reinforced need for colonoscopy, new referral placed  Check PSA, reports nocturia of 2 times per night, no change from baseline, not bothering him, not aware of family h/o prostate cancer. Depression/anxiety stable on current dose of zoloft. F/u in 6 months or sooner if needed.

## 2022-12-13 NOTE — PROGRESS NOTES
ASSESSMENT/PLAN:  1. Annual physical exam  Assessment & Plan:   Age related health maintenance and immunization recommendations reviewed and d/w pt, will complete flu vac today and pneumonia vac next visit. Labs ordered, healthy diet and regular exercise recommendations reviewed and d/w pt. Reinforced need for colonoscopy, new referral placed  Check PSA, reports nocturia of 2 times per night, no change from baseline, not bothering him, not aware of family h/o prostate cancer. Depression/anxiety stable on current dose of zoloft. F/u in 6 months or sooner if needed. Orders:  -     atorvastatin (LIPITOR) 20 MG tablet; Take 1 tablet by mouth daily, Disp-90 tablet, R-1Normal  -     lisinopril (PRINIVIL;ZESTRIL) 10 MG tablet; Take 1 tablet by mouth daily, Disp-90 tablet, R-1Normal  -     meloxicam (MOBIC) 15 MG tablet; Take 1 tablet by mouth daily, Disp-90 tablet, R-1Normal  -     sertraline (ZOLOFT) 50 MG tablet; Take 1 tablet by mouth daily, Disp-90 tablet, R-1Normal  -     PSA, Prostatic Specific Antigen; Future  -     AFL - Windy Dickey MD, Gastroenterology, [facility]  2. Primary hypertension  -     Hemoglobin A1C; Future  -     Lipid Panel; Future  -     CBC; Future  -     Comprehensive Metabolic Panel; Future  -     lisinopril (PRINIVIL;ZESTRIL) 10 MG tablet; Take 1 tablet by mouth daily, Disp-90 tablet, R-1Normal  3. Mixed hyperlipidemia  -     Lipid Panel; Future  -     CBC; Future  -     Comprehensive Metabolic Panel; Future  -     atorvastatin (LIPITOR) 20 MG tablet; Take 1 tablet by mouth daily, Disp-90 tablet, R-1Normal  4. GENEVIEVE (generalized anxiety disorder)  -     sertraline (ZOLOFT) 50 MG tablet; Take 1 tablet by mouth daily, Disp-90 tablet, R-1Normal  5. Spondylosis of lumbar region without myelopathy or radiculopathy  -     meloxicam (MOBIC) 15 MG tablet; Take 1 tablet by mouth daily, Disp-90 tablet, R-1Normal  6. Impaired glucose tolerance  -     Hemoglobin A1C; Future  -     CBC; Future  7. Erectile dysfunction, unspecified erectile dysfunction type  -     tadalafil (CIALIS) 20 MG tablet; Take 1 tablet by mouth daily as needed for Erectile Dysfunction, Disp-30 tablet, R-5Normal  8. Personal history of tobacco use  -     MI VISIT TO DISCUSS LUNG CA SCREEN W LDCT  -     CT Lung Screen (Annual); Future  9. Flu vaccine need  -     Influenza, FLUCELVAX, (age 10 mo+), IM, Preservative Free, 0.5 mL  10. Prostate cancer screening  -     PSA, Prostatic Specific Antigen; Future  11. Colon cancer screening  -     AFL - Go, Melvin Hernandez MD, Gastroenterology, [facility]      Return in about 6 months (around 6/13/2023). SUBJECTIVE  HPI:   Pt here for f/u on chronic problems, meds refills and yearly physical. Doing well and denies any acute concerns, still somking      Review of Systems   Constitutional:  Negative for activity change, appetite change, chills, diaphoresis, fatigue, fever and unexpected weight change. HENT:  Negative for congestion, dental problem, hearing loss, mouth sores, postnasal drip, sinus pain and sore throat. Eyes:  Negative for visual disturbance. Respiratory:  Negative for cough, chest tightness, shortness of breath and wheezing. Cardiovascular:  Negative for chest pain, palpitations and leg swelling. Gastrointestinal:  Negative for abdominal pain, blood in stool, constipation, diarrhea, nausea and vomiting. Endocrine: Negative for cold intolerance and heat intolerance. Genitourinary:  Positive for frequency. Negative for difficulty urinating, dysuria, hematuria and urgency. Musculoskeletal:  Negative for arthralgias, back pain, gait problem, joint swelling, myalgias, neck pain and neck stiffness. Skin:  Negative for color change, rash and wound. Allergic/Immunologic: Negative for environmental allergies and immunocompromised state. Neurological:  Negative for dizziness, tremors, syncope, speech difficulty, weakness, light-headedness, numbness and headaches. Hematological:  Negative for adenopathy. Does not bruise/bleed easily. Psychiatric/Behavioral:  Negative for behavioral problems, confusion, decreased concentration, dysphoric mood and sleep disturbance. The patient is not nervous/anxious. OBJECTIVE:    /72   Pulse 73   Ht 5' 11\" (1.803 m)   Wt 166 lb (75.3 kg)   SpO2 98%   BMI 23.15 kg/m²    Physical Exam  Constitutional:       Appearance: Normal appearance. He is normal weight. HENT:      Head: Normocephalic. Right Ear: Tympanic membrane and ear canal normal.      Left Ear: Tympanic membrane and ear canal normal.      Nose: Nose normal.      Mouth/Throat:      Mouth: Mucous membranes are moist.      Pharynx: Oropharynx is clear. Eyes:      Extraocular Movements: Extraocular movements intact. Conjunctiva/sclera: Conjunctivae normal.      Pupils: Pupils are equal, round, and reactive to light. Cardiovascular:      Rate and Rhythm: Normal rate and regular rhythm. Pulses: Normal pulses. Heart sounds: Normal heart sounds. No murmur heard. Pulmonary:      Effort: Pulmonary effort is normal. No respiratory distress. Breath sounds: Normal breath sounds. No wheezing. Abdominal:      General: Bowel sounds are normal.      Palpations: Abdomen is soft. There is no mass. Tenderness: There is no abdominal tenderness. Musculoskeletal:         General: No swelling, deformity or signs of injury. Normal range of motion. Cervical back: Normal range of motion and neck supple. Right lower leg: No edema. Left lower leg: No edema. Skin:     General: Skin is warm and dry. Neurological:      General: No focal deficit present. Mental Status: He is alert and oriented to person, place, and time. Mental status is at baseline. Cranial Nerves: No cranial nerve deficit. Psychiatric:         Mood and Affect: Mood normal.         Behavior: Behavior normal.         Thought Content:  Thought content normal. Electronically signed by Tasha Parra MD on 12/13/2022 at 1:27 PM.    This dictation was generated by voice recognition computer software. Although all attempts are made to edit the dictation for accuracy, there may be errors in the transcription that are not intended. Discussed with the patient the current USPSTF guidelines released March 9, 2021 for screening for lung cancer. For adults aged 48 to [de-identified] years who have a 20 pack-year smoking history and currently smoke or have quit within the past 15 years the grade B recommendation is to:  Screen for lung cancer with low-dose computed tomography (LDCT) every year. Stop screening once a person has not smoked for 15 years or has a health problem that limits life expectancy or the ability to have lung surgery. The patient  reports that he has been smoking cigarettes. He has a 40.00 pack-year smoking history. He has never used smokeless tobacco.. Discussed with patient the risks and benefits of screening, including over-diagnosis, false positive rate, and total radiation exposure. The patient currently exhibits no signs or symptoms suggestive of lung cancer. Discussed with patient the importance of compliance with yearly annual lung cancer screenings and willingness to undergo diagnosis and treatment if screening scan is positive. In addition, the patient was counseled regarding the importance of remaining smoke free and/or total smoking cessation.     Also reviewed the following if the patient has Medicare that as of February 10, 2022, Medicare only covers LDCT screening in patients aged 51-72 with at least a 20 pack-year smoking history who currently smoke or have quit in the last 15 years

## 2023-01-12 ENCOUNTER — TELEPHONE (OUTPATIENT)
Dept: INTERNAL MEDICINE CLINIC | Age: 58
End: 2023-01-12

## 2023-01-12 PROBLEM — Z00.00 ANNUAL PHYSICAL EXAM: Status: RESOLVED | Noted: 2022-12-13 | Resolved: 2023-01-12

## 2023-01-12 NOTE — TELEPHONE ENCOUNTER
----- Message from Dale Whitehead sent at 1/12/2023  8:40 AM EST -----  Subject: Appointment Request    Reason for Call: Established Patient Appointment needed: Semi-Routine   Cough, Cold Symptoms    QUESTIONS    Reason for appointment request? Available appointments did not meet   patient need     Additional Information for Provider?  Patients wife is calling in because   the patient is suffering from a Upper Respiratory infection and would like   for him to be seen in office so his lungs can get checked out   ---------------------------------------------------------------------------  --------------  [address] [employer]  [phone]; OK to leave message on voicemail  ---------------------------------------------------------------------------  --------------  SCRIPT ANSWERS  COVID Screen: Red

## 2023-01-12 NOTE — TELEPHONE ENCOUNTER
Patient needs to be evaluated, we have openings tomorrow both in the morning and in the afternoon, if unable to make it then recommends evaluation at urgent care

## 2023-06-08 DIAGNOSIS — F41.1 GAD (GENERALIZED ANXIETY DISORDER): ICD-10-CM

## 2023-06-08 DIAGNOSIS — Z00.00 ANNUAL PHYSICAL EXAM: ICD-10-CM

## 2023-06-12 DIAGNOSIS — Z00.00 ANNUAL PHYSICAL EXAM: ICD-10-CM

## 2023-06-12 DIAGNOSIS — E78.2 MIXED HYPERLIPIDEMIA: ICD-10-CM

## 2023-06-12 DIAGNOSIS — I10 PRIMARY HYPERTENSION: ICD-10-CM

## 2023-06-12 DIAGNOSIS — R73.02 IMPAIRED GLUCOSE TOLERANCE: ICD-10-CM

## 2023-06-12 DIAGNOSIS — Z12.5 PROSTATE CANCER SCREENING: ICD-10-CM

## 2023-06-12 LAB
ALBUMIN SERPL-MCNC: 4.6 G/DL (ref 3.4–5)
ALBUMIN/GLOB SERPL: 2.3 {RATIO} (ref 1.1–2.2)
ALP SERPL-CCNC: 68 U/L (ref 40–129)
ALT SERPL-CCNC: 18 U/L (ref 10–40)
ANION GAP SERPL CALCULATED.3IONS-SCNC: 11 MMOL/L (ref 3–16)
AST SERPL-CCNC: 17 U/L (ref 15–37)
BILIRUB SERPL-MCNC: 0.4 MG/DL (ref 0–1)
BUN SERPL-MCNC: 16 MG/DL (ref 7–20)
CALCIUM SERPL-MCNC: 9.4 MG/DL (ref 8.3–10.6)
CHLORIDE SERPL-SCNC: 107 MMOL/L (ref 99–110)
CHOLEST SERPL-MCNC: 162 MG/DL (ref 0–199)
CO2 SERPL-SCNC: 25 MMOL/L (ref 21–32)
CREAT SERPL-MCNC: 0.8 MG/DL (ref 0.8–1.3)
DEPRECATED RDW RBC AUTO: 13.3 % (ref 12.4–15.4)
GFR SERPLBLD CREATININE-BSD FMLA CKD-EPI: >60 ML/MIN/{1.73_M2}
GLUCOSE SERPL-MCNC: 100 MG/DL (ref 70–99)
HCT VFR BLD AUTO: 43 % (ref 40.5–52.5)
HDLC SERPL-MCNC: 79 MG/DL (ref 40–60)
HGB BLD-MCNC: 14.2 G/DL (ref 13.5–17.5)
LDLC SERPL CALC-MCNC: 72 MG/DL
MCH RBC QN AUTO: 31.4 PG (ref 26–34)
MCHC RBC AUTO-ENTMCNC: 33.1 G/DL (ref 31–36)
MCV RBC AUTO: 94.9 FL (ref 80–100)
PLATELET # BLD AUTO: 211 K/UL (ref 135–450)
PLATELET BLD QL SMEAR: ADEQUATE
PMV BLD AUTO: 9.7 FL (ref 5–10.5)
POTASSIUM SERPL-SCNC: 5 MMOL/L (ref 3.5–5.1)
PROT SERPL-MCNC: 6.6 G/DL (ref 6.4–8.2)
PSA SERPL DL<=0.01 NG/ML-MCNC: 0.55 NG/ML (ref 0–4)
RBC # BLD AUTO: 4.53 M/UL (ref 4.2–5.9)
SLIDE REVIEW: NORMAL
SODIUM SERPL-SCNC: 143 MMOL/L (ref 136–145)
TRIGL SERPL-MCNC: 55 MG/DL (ref 0–150)
VLDLC SERPL CALC-MCNC: 11 MG/DL
WBC # BLD AUTO: 9.5 K/UL (ref 4–11)

## 2023-06-13 LAB
EST. AVERAGE GLUCOSE BLD GHB EST-MCNC: 119.8 MG/DL
HBA1C MFR BLD: 5.8 %

## 2023-06-17 DIAGNOSIS — I10 PRIMARY HYPERTENSION: ICD-10-CM

## 2023-06-17 DIAGNOSIS — E78.2 MIXED HYPERLIPIDEMIA: ICD-10-CM

## 2023-06-17 DIAGNOSIS — M47.816 SPONDYLOSIS OF LUMBAR REGION WITHOUT MYELOPATHY OR RADICULOPATHY: ICD-10-CM

## 2023-06-17 DIAGNOSIS — Z00.00 ANNUAL PHYSICAL EXAM: ICD-10-CM

## 2023-06-19 NOTE — TELEPHONE ENCOUNTER
Last OV: 12/13/2022  Next OV: 6/21/2023    Next appointment katie/ Moncho Bernabe    Last fill:3/13/2023

## 2023-06-21 ENCOUNTER — OFFICE VISIT (OUTPATIENT)
Dept: INTERNAL MEDICINE CLINIC | Age: 58
End: 2023-06-21
Payer: OTHER GOVERNMENT

## 2023-06-21 VITALS
HEIGHT: 70 IN | HEART RATE: 65 BPM | OXYGEN SATURATION: 98 % | BODY MASS INDEX: 23.62 KG/M2 | SYSTOLIC BLOOD PRESSURE: 108 MMHG | WEIGHT: 165 LBS | DIASTOLIC BLOOD PRESSURE: 74 MMHG

## 2023-06-21 DIAGNOSIS — R73.02 IMPAIRED GLUCOSE TOLERANCE: ICD-10-CM

## 2023-06-21 DIAGNOSIS — E78.2 MIXED HYPERLIPIDEMIA: ICD-10-CM

## 2023-06-21 DIAGNOSIS — I10 PRIMARY HYPERTENSION: Primary | ICD-10-CM

## 2023-06-21 DIAGNOSIS — Z71.85 VACCINE COUNSELING: ICD-10-CM

## 2023-06-21 DIAGNOSIS — F41.1 GAD (GENERALIZED ANXIETY DISORDER): ICD-10-CM

## 2023-06-21 DIAGNOSIS — F17.200 NICOTINE DEPENDENCE WITH CURRENT USE: ICD-10-CM

## 2023-06-21 DIAGNOSIS — M47.816 SPONDYLOSIS OF LUMBAR REGION WITHOUT MYELOPATHY OR RADICULOPATHY: ICD-10-CM

## 2023-06-21 PROCEDURE — 3078F DIAST BP <80 MM HG: CPT | Performed by: INTERNAL MEDICINE

## 2023-06-21 PROCEDURE — 99214 OFFICE O/P EST MOD 30 MIN: CPT | Performed by: INTERNAL MEDICINE

## 2023-06-21 PROCEDURE — 3074F SYST BP LT 130 MM HG: CPT | Performed by: INTERNAL MEDICINE

## 2023-06-21 RX ORDER — ATORVASTATIN CALCIUM 20 MG/1
20 TABLET, FILM COATED ORAL DAILY
Qty: 90 TABLET | Refills: 1 | Status: SHIPPED | OUTPATIENT
Start: 2023-06-21

## 2023-06-21 RX ORDER — MELOXICAM 15 MG/1
15 TABLET ORAL DAILY
Qty: 90 TABLET | Refills: 1 | Status: SHIPPED | OUTPATIENT
Start: 2023-06-21

## 2023-06-21 RX ORDER — MELOXICAM 15 MG/1
15 TABLET ORAL DAILY
Qty: 90 TABLET | Refills: 1 | OUTPATIENT
Start: 2023-06-21

## 2023-06-21 RX ORDER — LISINOPRIL 10 MG/1
10 TABLET ORAL DAILY
Qty: 90 TABLET | Refills: 1 | Status: SHIPPED | OUTPATIENT
Start: 2023-06-21

## 2023-06-21 RX ORDER — LISINOPRIL 10 MG/1
10 TABLET ORAL DAILY
Qty: 90 TABLET | Refills: 1 | OUTPATIENT
Start: 2023-06-21

## 2023-06-21 RX ORDER — ATORVASTATIN CALCIUM 20 MG/1
20 TABLET, FILM COATED ORAL DAILY
Qty: 90 TABLET | Refills: 1 | OUTPATIENT
Start: 2023-06-21

## 2023-06-21 SDOH — ECONOMIC STABILITY: FOOD INSECURITY: WITHIN THE PAST 12 MONTHS, YOU WORRIED THAT YOUR FOOD WOULD RUN OUT BEFORE YOU GOT MONEY TO BUY MORE.: NEVER TRUE

## 2023-06-21 SDOH — ECONOMIC STABILITY: HOUSING INSECURITY
IN THE LAST 12 MONTHS, WAS THERE A TIME WHEN YOU DID NOT HAVE A STEADY PLACE TO SLEEP OR SLEPT IN A SHELTER (INCLUDING NOW)?: NO

## 2023-06-21 SDOH — ECONOMIC STABILITY: INCOME INSECURITY: HOW HARD IS IT FOR YOU TO PAY FOR THE VERY BASICS LIKE FOOD, HOUSING, MEDICAL CARE, AND HEATING?: NOT HARD AT ALL

## 2023-06-21 SDOH — ECONOMIC STABILITY: FOOD INSECURITY: WITHIN THE PAST 12 MONTHS, THE FOOD YOU BOUGHT JUST DIDN'T LAST AND YOU DIDN'T HAVE MONEY TO GET MORE.: NEVER TRUE

## 2023-06-21 ASSESSMENT — ENCOUNTER SYMPTOMS
WHEEZING: 0
COLOR CHANGE: 0
BACK PAIN: 0
NAUSEA: 0
SHORTNESS OF BREATH: 0
COUGH: 0
CHEST TIGHTNESS: 0
SORE THROAT: 0
VOMITING: 0
ABDOMINAL PAIN: 0
CONSTIPATION: 0

## 2023-06-21 ASSESSMENT — PATIENT HEALTH QUESTIONNAIRE - PHQ9
SUM OF ALL RESPONSES TO PHQ QUESTIONS 1-9: 0
SUM OF ALL RESPONSES TO PHQ9 QUESTIONS 1 & 2: 0
SUM OF ALL RESPONSES TO PHQ QUESTIONS 1-9: 0
1. LITTLE INTEREST OR PLEASURE IN DOING THINGS: 0
2. FEELING DOWN, DEPRESSED OR HOPELESS: 0

## 2023-06-21 NOTE — ASSESSMENT & PLAN NOTE
Counseled regarding need for smoking cessation, he is aware of available options to help him quit, counseled regarding need to complete pneumonia vaccine and update flu vaccine in early fall DISCHARGE SUMMARY    NAME:Stone Abel  : 1985  MRN: [id number]    The patient Jose Luis exhibits the ability to control behavior in a less restrictive environment. Patient's level of functioning is improving. No assaultive/destructive behavior has been observed for the past 24 hours. No suicidal/homicidal threat or behavior has been observed for the past 24 hours. There is no evidence of serious medication side effects. Patient has not been in physical or protective restraints for at least the past 24 hours. If weapons involved, how are they secured? No weapons involved. Is patient aware of and in agreement with discharge plan? Yes    Arrangements for medication:  No prescriptions written. Referral for substance abuse treatment? Dr. Tari Boas    Referral for smoking cessation needed? Patient is not a smoker/Not applicable. Copy of discharge instructions to provider?:  Shelby Gaitan    Arrangements for transportation home:  Wife to . Keep all follow up appointments as scheduled, continue to take prescribed medications per physician instructions. Mental health crisis number:  934 or your local mental health crisis line number at [phone]. DISCHARGE SUMMARY from Nurse    PATIENT INSTRUCTIONS:  What to do at Home:  Recommended activity: Activity as tolerated,     If you experience any of the following symptoms thoughts of harming self feeling overwhelmed with anxiety, sadness or hopelessness, please follow up with your therapist and PCP. If you can not reach them or their office immediately call your local crisis number. *  Please give a list of your current medications to your Primary Care Provider. *  Please update this list whenever your medications are discontinued, doses are      changed, or new medications (including over-the-counter products) are added. *  Please carry medication information at all times in case of emergency situations.     These are general instructions for a healthy lifestyle:    No smoking/ No tobacco products/ Avoid exposure to second hand smoke  Surgeon General's Warning:  Quitting smoking now greatly reduces serious risk to your health. Obesity, smoking, and sedentary lifestyle greatly increases your risk for illness    A healthy diet, regular physical exercise & weight monitoring are important for maintaining a healthy lifestyle    You may be retaining fluid if you have a history of heart failure or if you experience any of the following symptoms:  Weight gain of 3 pounds or more overnight or 5 pounds in a week, increased swelling in our hands or feet or shortness of breath while lying flat in bed. Please call your doctor as soon as you notice any of these symptoms; do not wait until your next office visit. Recognize signs and symptoms of STROKE:    F-face looks uneven    A-arms unable to move or move unevenly    S-speech slurred or non-existent    T-time-call 911 as soon as signs and symptoms begin-DO NOT go       Back to bed or wait to see if you get better-TIME IS BRAIN. Warning Signs of HEART ATTACK     Call 911 if you have these symptoms:   Chest discomfort. Most heart attacks involve discomfort in the center of the chest that lasts more than a few minutes, or that goes away and comes back. It can feel like uncomfortable pressure, squeezing, fullness, or pain.  Discomfort in other areas of the upper body. Symptoms can include pain or discomfort in one or both arms, the back, neck, jaw, or stomach.  Shortness of breath with or without chest discomfort.  Other signs may include breaking out in a cold sweat, nausea, or lightheadedness. Don't wait more than five minutes to call 911 - MINUTES MATTER! Fast action can save your life. Calling 911 is almost always the fastest way to get lifesaving treatment.  Emergency Medical Services staff can begin treatment when they arrive -- up to an hour sooner than if someone gets to the hospital by car. The discharge information has been reviewed with the patient. The patient verbalized understanding. Discharge medications reviewed with the patient and appropriate educational materials and side effects teaching were provided.   ___________________________________________________________________________________________________________________________________

## 2023-06-21 NOTE — PROGRESS NOTES
Effort: Pulmonary effort is normal. No respiratory distress. Breath sounds: No wheezing. Abdominal:      Palpations: Abdomen is soft. Tenderness: There is no abdominal tenderness. Musculoskeletal:         General: Normal range of motion. Cervical back: Neck supple. Skin:     General: Skin is warm and dry. Neurological:      General: No focal deficit present. Mental Status: He is alert. Cranial Nerves: No cranial nerve deficit. Psychiatric:         Mood and Affect: Mood normal.         Electronically signed by Caitlyn Plascencia MD on 6/21/2023 at 10:27 AM.    This dictation was generated by voice recognition computer software. Although all attempts are made to edit the dictation for accuracy, there may be errors in the transcription that are not intended.

## 2023-06-21 NOTE — ASSESSMENT & PLAN NOTE
Expected patient borderline fasting blood glucose reading along with hemoglobin A1c of 5.8 indicating prediabetes, recommended adherence to low-carb diet, avoid sweets and starchy food items, maintain regular level of physical activity, will recheck in 6 months if trending upward then we will discuss starting metformin

## 2023-06-21 NOTE — ASSESSMENT & PLAN NOTE
Results of lipid panel reviewed and discussed with patient, at target goal with current dose of Lipitor which she is tolerating well, will refill and continue same, reinforced recommendations for diet and exercise

## 2023-06-21 NOTE — ASSESSMENT & PLAN NOTE
Blood pressure stable and well-controlled on current dose of lisinopril, lab work within normal, refill and continue same, reinforced recommendations for healthy low-salt low-fat diet with regular exercise, counseled regarding need for smoking cessation

## 2023-06-21 NOTE — ASSESSMENT & PLAN NOTE
Discussed with patient recommendations to complete Tdap, Shingrix, P23 vaccines, he would like to schedule future nurse visits to complete shots.

## 2023-06-21 NOTE — ASSESSMENT & PLAN NOTE
Stable and getting satisfactory control of symptoms with meloxicam, continue same, reinforced need for daily core strengthening exercises and stretches

## 2023-11-13 ENCOUNTER — HOSPITAL ENCOUNTER (EMERGENCY)
Age: 58
Discharge: HOME | End: 2023-11-13
Attending: EMERGENCY MEDICINE
Payer: OTHER GOVERNMENT

## 2023-11-13 ENCOUNTER — APPOINTMENT (OUTPATIENT)
Dept: GENERAL RADIOLOGY | Age: 58
End: 2023-11-13
Payer: OTHER GOVERNMENT

## 2023-11-13 VITALS
RESPIRATION RATE: 17 BRPM | SYSTOLIC BLOOD PRESSURE: 137 MMHG | HEART RATE: 73 BPM | TEMPERATURE: 98.4 F | OXYGEN SATURATION: 99 % | DIASTOLIC BLOOD PRESSURE: 84 MMHG

## 2023-11-13 DIAGNOSIS — M54.9 OTHER ACUTE BACK PAIN: ICD-10-CM

## 2023-11-13 DIAGNOSIS — R05.1 ACUTE COUGH: Primary | ICD-10-CM

## 2023-11-13 LAB
ALBUMIN SERPL-MCNC: 4.1 G/DL (ref 3.4–5)
ALBUMIN/GLOB SERPL: 1.6 {RATIO} (ref 1.1–2.2)
ALP SERPL-CCNC: 91 U/L (ref 40–129)
ALT SERPL-CCNC: 17 U/L (ref 10–40)
ANION GAP SERPL CALCULATED.3IONS-SCNC: 13 MMOL/L (ref 3–16)
AST SERPL-CCNC: 22 U/L (ref 15–37)
BASOPHILS # BLD: 0.1 K/UL (ref 0–0.2)
BASOPHILS NFR BLD: 0.8 %
BILIRUB SERPL-MCNC: 0.4 MG/DL (ref 0–1)
BUN SERPL-MCNC: 16 MG/DL (ref 7–20)
CALCIUM SERPL-MCNC: 9.1 MG/DL (ref 8.3–10.6)
CHLORIDE SERPL-SCNC: 101 MMOL/L (ref 99–110)
CO2 SERPL-SCNC: 21 MMOL/L (ref 21–32)
CREAT SERPL-MCNC: 0.8 MG/DL (ref 0.8–1.3)
D DIMER: 0.42 UG/ML FEU (ref 0–0.6)
DEPRECATED RDW RBC AUTO: 12.7 % (ref 12.4–15.4)
EOSINOPHIL # BLD: 0.3 K/UL (ref 0–0.6)
EOSINOPHIL NFR BLD: 2.8 %
GFR SERPLBLD CREATININE-BSD FMLA CKD-EPI: >60 ML/MIN/{1.73_M2}
GLUCOSE SERPL-MCNC: 108 MG/DL (ref 70–99)
HCT VFR BLD AUTO: 39.1 % (ref 40.5–52.5)
HGB BLD-MCNC: 12.5 G/DL (ref 13.5–17.5)
LYMPHOCYTES # BLD: 1.9 K/UL (ref 1–5.1)
LYMPHOCYTES NFR BLD: 15.5 %
MCH RBC QN AUTO: 29.6 PG (ref 26–34)
MCHC RBC AUTO-ENTMCNC: 32 G/DL (ref 31–36)
MCV RBC AUTO: 92.5 FL (ref 80–100)
MONOCYTES # BLD: 1 K/UL (ref 0–1.3)
MONOCYTES NFR BLD: 8.2 %
NEUTROPHILS # BLD: 9 K/UL (ref 1.7–7.7)
NEUTROPHILS NFR BLD: 72.7 %
PLATELET # BLD AUTO: 306 K/UL (ref 135–450)
PMV BLD AUTO: 8.2 FL (ref 5–10.5)
POTASSIUM SERPL-SCNC: 5 MMOL/L (ref 3.5–5.1)
PROT SERPL-MCNC: 6.7 G/DL (ref 6.4–8.2)
RBC # BLD AUTO: 4.22 M/UL (ref 4.2–5.9)
SODIUM SERPL-SCNC: 135 MMOL/L (ref 136–145)
TROPONIN, HIGH SENSITIVITY: <6 NG/L (ref 0–22)
WBC # BLD AUTO: 12.4 K/UL (ref 4–11)

## 2023-11-13 PROCEDURE — 84484 ASSAY OF TROPONIN QUANT: CPT

## 2023-11-13 PROCEDURE — 80053 COMPREHEN METABOLIC PANEL: CPT

## 2023-11-13 PROCEDURE — 85025 COMPLETE CBC W/AUTO DIFF WBC: CPT

## 2023-11-13 PROCEDURE — 36415 COLL VENOUS BLD VENIPUNCTURE: CPT

## 2023-11-13 PROCEDURE — 99285 EMERGENCY DEPT VISIT HI MDM: CPT

## 2023-11-13 PROCEDURE — 93005 ELECTROCARDIOGRAM TRACING: CPT | Performed by: EMERGENCY MEDICINE

## 2023-11-13 PROCEDURE — 85379 FIBRIN DEGRADATION QUANT: CPT

## 2023-11-13 PROCEDURE — 71046 X-RAY EXAM CHEST 2 VIEWS: CPT

## 2023-11-13 RX ORDER — HYDROCODONE BITARTRATE AND ACETAMINOPHEN 5; 325 MG/1; MG/1
1 TABLET ORAL EVERY 4 HOURS PRN
Qty: 12 TABLET | Refills: 0 | Status: SHIPPED | OUTPATIENT
Start: 2023-11-13 | End: 2023-11-16

## 2023-11-13 RX ORDER — AZITHROMYCIN 250 MG/1
TABLET, FILM COATED ORAL
Qty: 1 PACKET | Refills: 0 | Status: SHIPPED | OUTPATIENT
Start: 2023-11-13 | End: 2023-11-23

## 2023-11-13 ASSESSMENT — PAIN - FUNCTIONAL ASSESSMENT: PAIN_FUNCTIONAL_ASSESSMENT: NONE - DENIES PAIN

## 2023-11-14 LAB
EKG ATRIAL RATE: 69 BPM
EKG DIAGNOSIS: NORMAL
EKG P AXIS: 70 DEGREES
EKG P-R INTERVAL: 176 MS
EKG Q-T INTERVAL: 394 MS
EKG QRS DURATION: 84 MS
EKG QTC CALCULATION (BAZETT): 422 MS
EKG R AXIS: 68 DEGREES
EKG T AXIS: 80 DEGREES
EKG VENTRICULAR RATE: 69 BPM

## 2023-11-14 PROCEDURE — 93010 ELECTROCARDIOGRAM REPORT: CPT | Performed by: INTERNAL MEDICINE

## 2023-11-14 NOTE — ED PROVIDER NOTES
[id number]   EMERGENCY DEPARTMENTENCOUNTER      Pt Name: Jairo Webb  MRN: [id number]  [address] [facility] 1965  Date ofevaluation: 11/13/2023  Provider: Radames Baker MD    CHIEF COMPLAINT       Chief Complaint   Patient presents with    Illness     Pt via self from home, c/o illness for 5 days, cough and congestion, some sob and pain to shoulder blades. Sent by primary for chest x ray       HPI    HISTORY OF PRESENT ILLNESS   (Location/Symptom, Timing/Onset,Context/Setting, Quality, Duration, Modifying Factors, Severity)  Note limiting factors. Jairo Webb is a 59 y.o. male who presents to the emergency department with a cough. This is a 77-year-old male who presents with a cough and some congestion he has had for the last 5 days. The patient states when he coughs he has a pain in his right upper back posterior shoulder blade area. He denies any shortness of breath. He denies any fevers or chills. The patient states he checked himself at home for COVID-19 and it was negative. NursingNotes were reviewed. Review of Systems    REVIEW OF SYSTEMS    (2-9 systems for level 4, 10 or more for level 5)     Review of Systems   Constitutional: Negative for fever. HENT: Negative for rhinorrhea and sore throat. Eyes: Negative for redness. Respiratory: Negative for shortness of breath. Cardiovascular: Negative for chest pain. Gastrointestinal: Negative for abdominal pain. Genitourinary: Negative for flank pain. Neurological: Negative for headaches. Hematological: Negative for adenopathy. Psychiatric/Behavioral: Negative for confusion. Except as noted above the remainder of the review of systems was reviewed and negative. PAST MEDICAL HISTORY   No past medical history on file. SURGICALHISTORY     No past surgical history on file.       CURRENT MEDICATIONS       Previous Medications    ATORVASTATIN (LIPITOR) 20 MG TABLET    Take 1

## 2023-11-14 NOTE — DISCHARGE INSTRUCTIONS
Take the Norco for pain control. Take azithromycin for possible infection. See your doctor in 2 days for recheck and return if worse.

## 2023-11-20 ENCOUNTER — OFFICE VISIT (OUTPATIENT)
Dept: INTERNAL MEDICINE CLINIC | Age: 58
End: 2023-11-20
Payer: OTHER GOVERNMENT

## 2023-11-20 VITALS
OXYGEN SATURATION: 99 % | DIASTOLIC BLOOD PRESSURE: 84 MMHG | HEART RATE: 75 BPM | WEIGHT: 172 LBS | SYSTOLIC BLOOD PRESSURE: 122 MMHG | BODY MASS INDEX: 24.68 KG/M2

## 2023-11-20 DIAGNOSIS — I10 PRIMARY HYPERTENSION: ICD-10-CM

## 2023-11-20 DIAGNOSIS — L27.0 DRUG RASH: ICD-10-CM

## 2023-11-20 DIAGNOSIS — F17.200 NICOTINE DEPENDENCE WITH CURRENT USE: ICD-10-CM

## 2023-11-20 DIAGNOSIS — S23.9XXS THORACIC BACK SPRAIN, SEQUELA: Primary | ICD-10-CM

## 2023-11-20 DIAGNOSIS — Z23 FLU VACCINE NEED: ICD-10-CM

## 2023-11-20 PROBLEM — S23.9XXA THORACIC BACK SPRAIN: Status: ACTIVE | Noted: 2023-11-20

## 2023-11-20 PROBLEM — M79.674 GREAT TOE PAIN, RIGHT: Status: RESOLVED | Noted: 2022-05-31 | Resolved: 2023-11-20

## 2023-11-20 PROCEDURE — 99214 OFFICE O/P EST MOD 30 MIN: CPT | Performed by: INTERNAL MEDICINE

## 2023-11-20 PROCEDURE — 3074F SYST BP LT 130 MM HG: CPT | Performed by: INTERNAL MEDICINE

## 2023-11-20 PROCEDURE — 3079F DIAST BP 80-89 MM HG: CPT | Performed by: INTERNAL MEDICINE

## 2023-11-20 PROCEDURE — 90471 IMMUNIZATION ADMIN: CPT | Performed by: INTERNAL MEDICINE

## 2023-11-20 PROCEDURE — 90674 CCIIV4 VAC NO PRSV 0.5 ML IM: CPT | Performed by: INTERNAL MEDICINE

## 2023-11-20 RX ORDER — METHYLPREDNISOLONE 4 MG/1
TABLET ORAL
Qty: 1 KIT | Refills: 0 | Status: SHIPPED | OUTPATIENT
Start: 2023-11-20 | End: 2023-11-26

## 2023-11-20 RX ORDER — METHOCARBAMOL 750 MG/1
750 TABLET, FILM COATED ORAL 4 TIMES DAILY
Qty: 40 TABLET | Refills: 0 | Status: SHIPPED | OUTPATIENT
Start: 2023-11-20 | End: 2023-11-30

## 2023-11-20 ASSESSMENT — ENCOUNTER SYMPTOMS
COUGH: 0
NAUSEA: 0
WHEEZING: 0
ABDOMINAL PAIN: 0
VOMITING: 0
SORE THROAT: 0
CHEST TIGHTNESS: 0
COLOR CHANGE: 0
BACK PAIN: 1
CONSTIPATION: 0
SHORTNESS OF BREATH: 0

## 2023-11-20 NOTE — ASSESSMENT & PLAN NOTE
discontinue hydrocodone, can continue as needed Benadryl, he will be given Medrol pack to help with his muscle sprain which should also help with the allergic reaction

## 2023-11-20 NOTE — ASSESSMENT & PLAN NOTE
Is This A New Presentation, Or A Follow-Up?: Skin Lesions symptoms consistent with muscular sprain, advised will add muscle relaxer, Medrol pack to help with inflammation and then can switch back into as needed NSAIDs, can continue with heat pad and call the office if any new or worsening symptoms Additional History: Patient would like to cosmetically treat cherry angioma and seborrheic keratosis

## 2023-11-20 NOTE — PROGRESS NOTES
ASSESSMENT/PLAN:  1. Thoracic back sprain, sequela  Assessment & Plan:    symptoms consistent with muscular sprain, advised will add muscle relaxer, Medrol pack to help with inflammation and then can switch back into as needed NSAIDs, can continue with heat pad and call the office if any new or worsening symptoms  Orders:  -     methocarbamol (ROBAXIN-750) 750 MG tablet; Take 1 tablet by mouth 4 times daily for 10 days, Disp-40 tablet, R-0Normal  -     methylPREDNISolone (MEDROL DOSEPACK) 4 MG tablet; Take by mouth., Disp-1 kit, R-0Normal  2. Drug rash  Assessment & Plan:    discontinue hydrocodone, can continue as needed Benadryl, he will be given Medrol pack to help with his muscle sprain which should also help with the allergic reaction  Orders:  -     methylPREDNISolone (MEDROL DOSEPACK) 4 MG tablet; Take by mouth., Disp-1 kit, R-0Normal  3. Primary hypertension  Assessment & Plan:    blood pressure stable and well-controlled, continue same and follow-up as scheduled  4. Flu vaccine need  -     Influenza, FLUCELVAX, (age 10 mo+), IM, Preservative Free, 0.5 mL  5. Nicotine dependence with current use  Assessment & Plan:    encouraged to continue attempts for smoking cessation and cutting down number of cigarettes      Return for as scheduled. SUBJECTIVE  HPI:   Patient went to urgent care last week because he was having acute cough and congestion, he also noticed midthoracic back pain on the right side especially when coughing. He was then sent to the ER to have an x-ray done which was negative however he was given prophylactic antibiotics for possible pneumonia and then given hydrocodone to help with the back pain. He does have allergies to codeine and he broke up in hives after taking 1 hydrocodone. Still complaining of right-sided back pain over right shoulder blade with certain movements.   Reports Advil has been helpful some        Review of Systems   Constitutional:  Negative for activity change,

## 2023-11-29 ENCOUNTER — APPOINTMENT (OUTPATIENT)
Dept: CT IMAGING | Age: 58
End: 2023-11-29
Payer: OTHER GOVERNMENT

## 2023-11-29 ENCOUNTER — HOSPITAL ENCOUNTER (EMERGENCY)
Age: 58
Discharge: HOME | End: 2023-11-29
Attending: EMERGENCY MEDICINE
Payer: OTHER GOVERNMENT

## 2023-11-29 ENCOUNTER — APPOINTMENT (OUTPATIENT)
Dept: GENERAL RADIOLOGY | Age: 58
End: 2023-11-29
Payer: OTHER GOVERNMENT

## 2023-11-29 ENCOUNTER — TELEPHONE (OUTPATIENT)
Dept: INTERNAL MEDICINE CLINIC | Age: 58
End: 2023-11-29

## 2023-11-29 VITALS
TEMPERATURE: 98.5 F | RESPIRATION RATE: 14 BRPM | SYSTOLIC BLOOD PRESSURE: 147 MMHG | OXYGEN SATURATION: 100 % | DIASTOLIC BLOOD PRESSURE: 93 MMHG | WEIGHT: 168 LBS | HEART RATE: 73 BPM | BODY MASS INDEX: 24.11 KG/M2

## 2023-11-29 DIAGNOSIS — C79.51 CANCER, METASTATIC TO BONE (HCC): ICD-10-CM

## 2023-11-29 DIAGNOSIS — C34.11 MALIGNANT NEOPLASM OF UPPER LOBE OF RIGHT LUNG (HCC): ICD-10-CM

## 2023-11-29 DIAGNOSIS — J18.9 ATYPICAL PNEUMONIA: ICD-10-CM

## 2023-11-29 DIAGNOSIS — C34.91 MALIGNANT NEOPLASM OF RIGHT LUNG, UNSPECIFIED PART OF LUNG (HCC): Primary | ICD-10-CM

## 2023-11-29 DIAGNOSIS — M89.8X1 PAIN OF RIGHT SCAPULA: Primary | ICD-10-CM

## 2023-11-29 LAB
ALBUMIN SERPL-MCNC: 4.3 G/DL (ref 3.4–5)
ALBUMIN/GLOB SERPL: 1.4 {RATIO} (ref 1.1–2.2)
ALP SERPL-CCNC: 96 U/L (ref 40–129)
ALT SERPL-CCNC: 20 U/L (ref 10–40)
ANION GAP SERPL CALCULATED.3IONS-SCNC: 10 MMOL/L (ref 3–16)
AST SERPL-CCNC: 16 U/L (ref 15–37)
BASOPHILS # BLD: 0.2 K/UL (ref 0–0.2)
BASOPHILS NFR BLD: 1.1 %
BILIRUB SERPL-MCNC: <0.2 MG/DL (ref 0–1)
BUN SERPL-MCNC: 18 MG/DL (ref 7–20)
CALCIUM SERPL-MCNC: 9.5 MG/DL (ref 8.3–10.6)
CHLORIDE SERPL-SCNC: 103 MMOL/L (ref 99–110)
CO2 SERPL-SCNC: 25 MMOL/L (ref 21–32)
CREAT SERPL-MCNC: 0.7 MG/DL (ref 0.8–1.3)
DEPRECATED RDW RBC AUTO: 12.6 % (ref 12.4–15.4)
EKG ATRIAL RATE: 64 BPM
EKG DIAGNOSIS: NORMAL
EKG P AXIS: 64 DEGREES
EKG P-R INTERVAL: 166 MS
EKG Q-T INTERVAL: 410 MS
EKG QRS DURATION: 88 MS
EKG QTC CALCULATION (BAZETT): 422 MS
EKG R AXIS: 53 DEGREES
EKG T AXIS: 75 DEGREES
EKG VENTRICULAR RATE: 64 BPM
EOSINOPHIL # BLD: 0.3 K/UL (ref 0–0.6)
EOSINOPHIL NFR BLD: 2 %
GFR SERPLBLD CREATININE-BSD FMLA CKD-EPI: >60 ML/MIN/{1.73_M2}
GLUCOSE SERPL-MCNC: 100 MG/DL (ref 70–99)
HCT VFR BLD AUTO: 37.5 % (ref 40.5–52.5)
HGB BLD-MCNC: 12.2 G/DL (ref 13.5–17.5)
INR PPP: 0.98 (ref 0.84–1.16)
LYMPHOCYTES # BLD: 1.8 K/UL (ref 1–5.1)
LYMPHOCYTES NFR BLD: 12.1 %
MCH RBC QN AUTO: 29.7 PG (ref 26–34)
MCHC RBC AUTO-ENTMCNC: 32.4 G/DL (ref 31–36)
MCV RBC AUTO: 91.7 FL (ref 80–100)
MONOCYTES # BLD: 1.5 K/UL (ref 0–1.3)
MONOCYTES NFR BLD: 10.2 %
NEUTROPHILS # BLD: 11.2 K/UL (ref 1.7–7.7)
NEUTROPHILS NFR BLD: 74.6 %
NT-PROBNP SERPL-MCNC: 43 PG/ML (ref 0–124)
PLATELET # BLD AUTO: 349 K/UL (ref 135–450)
PMV BLD AUTO: 7.9 FL (ref 5–10.5)
POTASSIUM SERPL-SCNC: 4.6 MMOL/L (ref 3.5–5.1)
PROT SERPL-MCNC: 7.3 G/DL (ref 6.4–8.2)
PROTHROMBIN TIME: 13 SEC (ref 11.5–14.8)
RBC # BLD AUTO: 4.09 M/UL (ref 4.2–5.9)
SODIUM SERPL-SCNC: 138 MMOL/L (ref 136–145)
TROPONIN, HIGH SENSITIVITY: 8 NG/L (ref 0–22)
WBC # BLD AUTO: 15 K/UL (ref 4–11)

## 2023-11-29 PROCEDURE — 84484 ASSAY OF TROPONIN QUANT: CPT

## 2023-11-29 PROCEDURE — 99285 EMERGENCY DEPT VISIT HI MDM: CPT

## 2023-11-29 PROCEDURE — 6360000004 HC RX CONTRAST MEDICATION: Performed by: EMERGENCY MEDICINE

## 2023-11-29 PROCEDURE — 6370000000 HC RX 637 (ALT 250 FOR IP): Performed by: EMERGENCY MEDICINE

## 2023-11-29 PROCEDURE — 71260 CT THORAX DX C+: CPT

## 2023-11-29 PROCEDURE — 71045 X-RAY EXAM CHEST 1 VIEW: CPT

## 2023-11-29 PROCEDURE — 80053 COMPREHEN METABOLIC PANEL: CPT

## 2023-11-29 PROCEDURE — 71046 X-RAY EXAM CHEST 2 VIEWS: CPT

## 2023-11-29 PROCEDURE — 93010 ELECTROCARDIOGRAM REPORT: CPT | Performed by: INTERNAL MEDICINE

## 2023-11-29 PROCEDURE — 85610 PROTHROMBIN TIME: CPT

## 2023-11-29 PROCEDURE — 6360000002 HC RX W HCPCS: Performed by: EMERGENCY MEDICINE

## 2023-11-29 PROCEDURE — 96372 THER/PROPH/DIAG INJ SC/IM: CPT

## 2023-11-29 PROCEDURE — 83880 ASSAY OF NATRIURETIC PEPTIDE: CPT

## 2023-11-29 PROCEDURE — 93005 ELECTROCARDIOGRAM TRACING: CPT | Performed by: EMERGENCY MEDICINE

## 2023-11-29 PROCEDURE — 85025 COMPLETE CBC W/AUTO DIFF WBC: CPT

## 2023-11-29 RX ORDER — CYCLOBENZAPRINE HCL 10 MG
10 TABLET ORAL 3 TIMES DAILY PRN
Qty: 20 TABLET | Refills: 0 | Status: SHIPPED | OUTPATIENT
Start: 2023-11-29 | End: 2023-12-09

## 2023-11-29 RX ORDER — AZITHROMYCIN 250 MG/1
250 TABLET, FILM COATED ORAL SEE ADMIN INSTRUCTIONS
Qty: 6 TABLET | Refills: 0 | Status: SHIPPED | OUTPATIENT
Start: 2023-11-29 | End: 2023-12-04

## 2023-11-29 RX ORDER — ACETAMINOPHEN 325 MG/1
650 TABLET ORAL EVERY 6 HOURS PRN
Qty: 120 TABLET | Refills: 3 | Status: SHIPPED | OUTPATIENT
Start: 2023-11-29

## 2023-11-29 RX ORDER — LIDOCAINE 4 G/G
1 PATCH TOPICAL ONCE
Status: DISCONTINUED | OUTPATIENT
Start: 2023-11-29 | End: 2023-11-29 | Stop reason: HOSPADM

## 2023-11-29 RX ORDER — KETOROLAC TROMETHAMINE 15 MG/ML
30 INJECTION, SOLUTION INTRAMUSCULAR; INTRAVENOUS ONCE
Status: COMPLETED | OUTPATIENT
Start: 2023-11-29 | End: 2023-11-29

## 2023-11-29 RX ORDER — LIDOCAINE 50 MG/G
1 PATCH TOPICAL DAILY
Qty: 30 PATCH | Refills: 0 | Status: SHIPPED | OUTPATIENT
Start: 2023-11-29

## 2023-11-29 RX ORDER — CYCLOBENZAPRINE HCL 10 MG
10 TABLET ORAL ONCE
Status: COMPLETED | OUTPATIENT
Start: 2023-11-29 | End: 2023-11-29

## 2023-11-29 RX ADMIN — KETOROLAC TROMETHAMINE 30 MG: 15 INJECTION, SOLUTION INTRAMUSCULAR; INTRAVENOUS at 02:37

## 2023-11-29 RX ADMIN — CYCLOBENZAPRINE 10 MG: 10 TABLET, FILM COATED ORAL at 02:39

## 2023-11-29 RX ADMIN — IOPAMIDOL 75 ML: 755 INJECTION, SOLUTION INTRAVENOUS at 05:35

## 2023-11-29 ASSESSMENT — PAIN - FUNCTIONAL ASSESSMENT: PAIN_FUNCTIONAL_ASSESSMENT: 0-10

## 2023-11-29 ASSESSMENT — PAIN DESCRIPTION - LOCATION: LOCATION: SHOULDER

## 2023-11-29 ASSESSMENT — PAIN SCALES - GENERAL: PAINLEVEL_OUTOF10: 5

## 2023-11-29 ASSESSMENT — PAIN DESCRIPTION - ORIENTATION: ORIENTATION: RIGHT

## 2023-11-29 NOTE — TELEPHONE ENCOUNTER
----- Message from Barbara Caballero sent at 11/29/2023 11:18 AM EST -----  Subject: Appointment Request    Reason for Call: Established Patient Appointment needed: Routine ED Follow   Up Visit    QUESTIONS    Reason for appointment request? No appointments available during search     Additional Information for Provider? patient was in the ER today and dx   with lung cancer. Wife states he needs to see Dr. Singh Reno for referral to   oncology.  States it has to be Dr. Tom Fritz due to insurance via [employer], call   wife Emily Blue asap at [phone]  ---------------------------------------------------------------------------  --------------  [address] [place] [address] [place]  [phone]; OK to leave message on voicemail  ---------------------------------------------------------------------------  --------------  SCRIPT ANSWERS
Called patient's wife. They released patient about 6 am this morning, they did not give him a referral for Oncology. Do you need him to come back in for another ED follow up or can you send referral in?    Would like to see [facility] in [facility]
Referrals placed for [facility] and pulmonary as he may need bronchoscopy and lung biopsy as well
No

## 2023-11-29 NOTE — ED NOTES
Educated pt on  x4 prescriptions and discharge paperwork as well as follow-up appointment. Pt verbalizes understanding of all instructions and denies questions. IV discontinued, catheter intact, minimal bleeding at IV site, 2x2 and tape applied, manual pressure held. Pt left ambulatory by self with all personal belongings, and discharge paperwork to private residence. Pt in no distress at this time.      Kamlesh Godinez RN  11/29/23 [id number]

## 2023-11-29 NOTE — ED PROVIDER NOTES
[facility]) Emergency [address]    Rocio Cameron MD, am the primary clinician of record. CHIEF COMPLAINT  Chief Complaint   Patient presents with    Shoulder Pain     Pt reports right shoulder blade area pain ongoing for a couple weeks now. Seen here and by PCP, imaging done. Prescribed pain medications and muscle relaxer's with little relief. HISTORY OF PRESENT ILLNESS  Nohelia Alvarado is a 59 y.o. male  who presents to the ED complaining of right scapular pain. The patient was initially seen here on November 13 with URI symptoms and right scapular pain. No chest pains though, no pleurisy, and pain is worse with movement of this area which he thinks may have been from coughing. It was felt to be likely musculoskeletal and on that initial visit was prescribed Norco which caused an allergic reaction as well as azithromycin because he was a smoker although his chest x-ray was clear on that visit. He later saw his PCP on 20 November in follow-up and was told to discontinue the Norco and lieu of a muscle relaxer, Robaxin, and NSAIDs. He was also given a Medrol Dosepak. He tells me today that his symptoms are persistent so he comes in for evaluation. No fevers, no sputum, no abd pain or n/v/d/c. No other complaints, modifying factors or associated symptoms. I have reviewed the following from the nursing documentation. History reviewed. No pertinent past medical history. History reviewed. No pertinent surgical history.   Family History   Problem Relation Age of Onset    Heart Attack Father      Social History     Socioeconomic History    Marital status:      Spouse name: Paolo Olvera    Number of children: 4    Years of education: Not on file    Highest education level: Not on file   Occupational History    Not on file   Tobacco Use    Smoking status: Every Day     Packs/day: 1.00     Years: 40.00     Additional pack years: 0.00     Total pack years: 40.00

## 2023-11-29 NOTE — ED TRIAGE NOTES
Pt reports right shoulder blade area pain ongoing for a couple weeks now. Seen here and by PCP, imaging done. Prescribed pain medications and muscle relaxer's with little relief.

## 2023-12-01 ENCOUNTER — OFFICE VISIT (OUTPATIENT)
Dept: PULMONOLOGY | Age: 58
End: 2023-12-01
Payer: OTHER GOVERNMENT

## 2023-12-01 ENCOUNTER — TELEPHONE (OUTPATIENT)
Dept: PULMONOLOGY | Age: 58
End: 2023-12-01

## 2023-12-01 ENCOUNTER — TELEPHONE (OUTPATIENT)
Dept: INTERNAL MEDICINE CLINIC | Age: 58
End: 2023-12-01

## 2023-12-01 VITALS — BODY MASS INDEX: 24.28 KG/M2 | HEART RATE: 96 BPM | WEIGHT: 169.2 LBS | OXYGEN SATURATION: 98 %

## 2023-12-01 DIAGNOSIS — F17.200 CURRENT EVERY DAY SMOKER: ICD-10-CM

## 2023-12-01 DIAGNOSIS — C34.11 MALIGNANT NEOPLASM OF UPPER LOBE OF RIGHT LUNG (HCC): Primary | ICD-10-CM

## 2023-12-01 PROCEDURE — 99204 OFFICE O/P NEW MOD 45 MIN: CPT | Performed by: INTERNAL MEDICINE

## 2023-12-01 RX ORDER — VARENICLINE TARTRATE 1 MG/1
1 TABLET, FILM COATED ORAL 2 TIMES DAILY
Qty: 60 TABLET | Refills: 2 | Status: SHIPPED | OUTPATIENT
Start: 2023-12-01

## 2023-12-01 NOTE — TELEPHONE ENCOUNTER
Working on doing referral now.  Will send over as soon as it is done [As Noted in HPI] : as noted in HPI [Negative] : Heme/Lymph

## 2023-12-01 NOTE — TELEPHONE ENCOUNTER
Patients wife called and said insurance denied patients medication and would need something else to be called in.    varenicline (CHANTIX) 1 MG tablet [[id number]]    [address] [id number] - [id number]

## 2023-12-01 NOTE — PROGRESS NOTES
PULMONARY OFFICE NEW PATIENT VISIT    CONSULTING PHYSICIAN:      REASON FOR VISIT:   Chief Complaint   Patient presents with    OTHER     Abnormal CT 11/29/23       DATE OF VISIT: 12/1/2023    HISTORY OF PRESENT ILLNESS: 59y.o. year old male with past medical history of hypertension, current smoker who is here for evaluation of abnormal CT. Patient stated that 3 weeks ago he developed URI symptoms along with cough and right shoulder blade pain. He went to ER where he had chest x-ray. Patient was given pain medication and sent home. Symptoms did not improve and he saw his primary care physician and was prescribed antibiotics and muscle relaxers. He presented to ER 11/29/2023 as his symptoms did not improve. He had a CT chest performed on 11/29/2023 that showed multiple lung nodules. Few prominent ones were in RUL 1.3 x 1.1 x 1.2 cm in size at the medial aspect of the right upper lobe. Another 2 right upper lobe apical nodules noted along with another right lower lobe nodule. Few other small subcentimeter lung nodules were also noted in the right lung annual-in the left upper lobe. Bony metastasis involving the right third rib also noted. Patient states that he still has cough productive of whitish sputum. Right scapular pain is slightly better with combination of pain medication and muscle relaxant. He denies any shortness of breath or hemoptysis. Denies any change in appetite or weight loss. Denies history of cancer. No family history of lung cancer. Patient has extensive smoking history of 40 pack years. Currently smoking roughly 1 pack every 3 days. He is interested in quitting smoking. I personally reviewed and interpreted the images      REVIEW OF SYSTEMS:   CONSTITUTIONAL SYMPTOMS: The patient denies fever, fatigue, night sweats, weight loss or weight gain. HEENT: No vision changes. No tinnitus, Denies sinus pain. No hoarseness, or dysphagia.    NECK: Patient denies swelling in

## 2023-12-01 NOTE — TELEPHONE ENCOUNTER
Called [facility] for Dr Hairst[facility] office [phone] to send a referral for patient to see us to [address] [place] [address]. They sent a message back to be done. Will wait on referral to start PA for PET scan scheduled right now on 12/11/23 @ 1030 MFF depending on PA for Stefania.

## 2023-12-01 NOTE — TELEPHONE ENCOUNTER
Dr. Corinne Kirks office is requesting a referral to be sent for a PET Scan. [facility]. Fax number is [phone].

## 2023-12-05 ENCOUNTER — TELEPHONE (OUTPATIENT)
Dept: PULMONOLOGY | Age: 58
End: 2023-12-05

## 2023-12-05 NOTE — TELEPHONE ENCOUNTER
PET scan scheduled for 12/11/23 at 10:30 pending PA    Waiting on approval for patient portal to do PA for [facility] may need to R/S till can be done.      [facility] [phone]

## 2023-12-06 ENCOUNTER — OFFICE VISIT (OUTPATIENT)
Dept: INTERNAL MEDICINE CLINIC | Age: 58
End: 2023-12-06
Payer: OTHER GOVERNMENT

## 2023-12-06 VITALS
OXYGEN SATURATION: 97 % | SYSTOLIC BLOOD PRESSURE: 132 MMHG | WEIGHT: 172 LBS | DIASTOLIC BLOOD PRESSURE: 90 MMHG | HEART RATE: 86 BPM | BODY MASS INDEX: 24.68 KG/M2

## 2023-12-06 DIAGNOSIS — C34.11 MALIGNANT NEOPLASM OF UPPER LOBE OF RIGHT LUNG (HCC): Primary | ICD-10-CM

## 2023-12-06 DIAGNOSIS — F17.200 NICOTINE DEPENDENCE WITH CURRENT USE: ICD-10-CM

## 2023-12-06 DIAGNOSIS — E78.2 MIXED HYPERLIPIDEMIA: ICD-10-CM

## 2023-12-06 DIAGNOSIS — F41.1 GAD (GENERALIZED ANXIETY DISORDER): ICD-10-CM

## 2023-12-06 DIAGNOSIS — I10 PRIMARY HYPERTENSION: ICD-10-CM

## 2023-12-06 DIAGNOSIS — C79.51 METASTATIC CANCER TO BONE (HCC): ICD-10-CM

## 2023-12-06 PROBLEM — S23.9XXA THORACIC BACK SPRAIN: Status: RESOLVED | Noted: 2023-11-20 | Resolved: 2023-12-06

## 2023-12-06 PROCEDURE — 99214 OFFICE O/P EST MOD 30 MIN: CPT | Performed by: INTERNAL MEDICINE

## 2023-12-06 PROCEDURE — 3080F DIAST BP >= 90 MM HG: CPT | Performed by: INTERNAL MEDICINE

## 2023-12-06 PROCEDURE — 3075F SYST BP GE 130 - 139MM HG: CPT | Performed by: INTERNAL MEDICINE

## 2023-12-06 RX ORDER — TRAMADOL HYDROCHLORIDE 50 MG/1
50 TABLET ORAL EVERY 4 HOURS PRN
Qty: 42 TABLET | Refills: 0 | Status: SHIPPED | OUTPATIENT
Start: 2023-12-06 | End: 2023-12-13

## 2023-12-06 RX ORDER — LIDOCAINE 50 MG/G
1 PATCH TOPICAL DAILY
Qty: 30 PATCH | Refills: 0 | Status: SHIPPED | OUTPATIENT
Start: 2023-12-06

## 2023-12-06 RX ORDER — LISINOPRIL 10 MG/1
10 TABLET ORAL DAILY
Qty: 90 TABLET | Refills: 1 | Status: SHIPPED | OUTPATIENT
Start: 2023-12-06

## 2023-12-06 RX ORDER — ATORVASTATIN CALCIUM 20 MG/1
20 TABLET, FILM COATED ORAL DAILY
Qty: 90 TABLET | Refills: 1 | Status: SHIPPED | OUTPATIENT
Start: 2023-12-06

## 2023-12-06 RX ORDER — CYCLOBENZAPRINE HCL 10 MG
10 TABLET ORAL 3 TIMES DAILY PRN
Qty: 20 TABLET | Refills: 0 | Status: SHIPPED | OUTPATIENT
Start: 2023-12-06 | End: 2023-12-16

## 2023-12-06 ASSESSMENT — ENCOUNTER SYMPTOMS
BACK PAIN: 1
ABDOMINAL PAIN: 0
SHORTNESS OF BREATH: 0
COLOR CHANGE: 0
NAUSEA: 0
COUGH: 0
CHEST TIGHTNESS: 0
CONSTIPATION: 0
VOMITING: 0
WHEEZING: 0
SORE THROAT: 0

## 2023-12-06 NOTE — ASSESSMENT & PLAN NOTE
heme-onc evaluation is pending insurance approval, he already saw pulmonary, plan for PET scan followed by attempting tissue biopsy.   He is in the process of quitting smoking, remains without any shortness of breath or pulmonary symptoms

## 2023-12-06 NOTE — ASSESSMENT & PLAN NOTE
lytic lesion on scapula believed to be secondary to bone mets from underlying lung cancer, current pain management suboptimal, advised can do week worth of tramadol to take at nighttime until able to establish with oncology for more comprehensive cancer care, he understands this is a controlled substance, he will use as needed for breakthrough pain and continue other nonopioid measures including Lidoderm patches, Tylenol and as needed NSAIDs during the daytime

## 2023-12-06 NOTE — ASSESSMENT & PLAN NOTE
currently using nicotine patches, he is determined to continue abstinence from smoking following recent diagnosis of lung cancer, encouraged to continue same and encouraged to continue to have discussion with his wife to stop smoking herself and not to be around her to avoid secondhand smoking.

## 2023-12-07 ENCOUNTER — TELEPHONE (OUTPATIENT)
Dept: INTERNAL MEDICINE CLINIC | Age: 58
End: 2023-12-07

## 2023-12-07 NOTE — TELEPHONE ENCOUNTER
FYI    Patient PCP called in to advise that our office needs to do prior Auth which Dr. Ke Arreola assistant is working on. I called and spoke with patient letting him know that I did not feel comfortable adding more hand in the process that it would be saff for us to wait until medical assistant returns to finish where she left off. Patient verbally understood and was fine with that but did state he wanted to wait until 12/8/2023 to cancel it as he has been communicating with his insurance as well. Patient will call tomorrow to F/U on cancelling test if need be.

## 2023-12-07 NOTE — TELEPHONE ENCOUNTER
Called pulmonary--they ordered the PET scan so they have to do the PA for it. They are doing it now. Patient wife informed.

## 2023-12-07 NOTE — TELEPHONE ENCOUNTER
----- Message from [facility] Dani sent at 12/6/2023  3:31 PM EST -----  Subject: Referral Request    Reason for referral request? Urgent: Santiago Rangel called for a referral to a   specialist for a PET scan that they have scheduled on Monday, 12/11 with   Felipe ALBA. Provider patient wants to be referred to(if known):     Provider Phone Number(if known):     Additional Information for Provider?   ---------------------------------------------------------------------------  --------------  Ronaldo [place]ham    [phone]; OK to leave message on voicemail  ---------------------------------------------------------------------------  --------------

## 2023-12-11 ENCOUNTER — TELEPHONE (OUTPATIENT)
Dept: INTERNAL MEDICINE CLINIC | Age: 58
End: 2023-12-11

## 2023-12-11 NOTE — TELEPHONE ENCOUNTER
Patient's wife called to let Dr. Sheila Aaron know that the referral for the PET scan has to come from her and sent to pulmonary. They have required it to come from his PCP. Please call his wife back at [phone] to confirm this has been sent.

## 2023-12-22 PROBLEM — L27.0 DRUG RASH: Status: RESOLVED | Noted: 2023-11-20 | Resolved: 2023-12-22

## 2024-01-07 DIAGNOSIS — F41.1 GAD (GENERALIZED ANXIETY DISORDER): ICD-10-CM

## 2024-01-09 ENCOUNTER — TELEPHONE (OUTPATIENT)
Dept: INTERNAL MEDICINE CLINIC | Age: 59
End: 2024-01-09

## 2024-01-09 NOTE — TELEPHONE ENCOUNTER
[facility] calling in regarding referral made on 12/22/23. They are needing that info faxed to [facility] referral dept for authorization.    Referral faxed to [facility] Referral Dept: [phone] and [phone].

## 2024-01-09 NOTE — TELEPHONE ENCOUNTER
Patient's wife called requesting referral for MRI / chemo faxed to [facility]. Patient's wife states patient was scheduled for a chemo yesterday but it was denied due to needing an referral from his pcp. Please give patient's wife a call back once referral is faxed. [phone]

## 2024-01-09 NOTE — TELEPHONE ENCOUNTER
Called [facility] sent them approved referral from [facility]. Informed that MRI and chemo referral has to be made by ordering office.

## 2024-01-12 ENCOUNTER — TELEPHONE (OUTPATIENT)
Dept: CARDIOLOGY CLINIC | Age: 59
End: 2024-01-12

## 2024-01-12 NOTE — TELEPHONE ENCOUNTER
New Patient Apt Monday 9:00 01/15 to establish    Pt has been seeing another pcp for the last 2 years. He has stage 4 lung cancer, and was starting treatment.     [facility] changed his PCP to dr. Quinn without notifing the pt. Pt can not receive treatment without referral from Dr. Quinn even tho they have not been seen.     Scheduled 30 minute new patient appt with Dr. Quinn for Monday Morning to see how they should proceed.

## 2024-01-12 NOTE — TELEPHONE ENCOUNTER
Called [facility]--was told pt needs to call and changed PCM it is somehow listed under Dr. Quinn. Called patient wife gave her all informational step to get this changed and started today.

## 2024-01-12 NOTE — TELEPHONE ENCOUNTER
Spouse is calling regarding referral sent to [facility] for approval to see Dr. Laws @ [facility]. She states the approval notice was sent to [facility] however the doctor on the [facility] referral approval letter did not state [facility]-the approval notice was for a doctor at [facility].    She is asking for the referral to be sent again marked \"urgent\" to the [facility] referral dept and the approval letter needs to say [facility], .    Spouse would like a call back with a status update about faxing to [facility]. She is concerned because patient is already 1 week overdue for starting chemo - he has stage 4 cancer.

## 2024-01-15 ENCOUNTER — OFFICE VISIT (OUTPATIENT)
Dept: PRIMARY CARE CLINIC | Age: 59
End: 2024-01-15
Payer: OTHER GOVERNMENT

## 2024-01-15 VITALS
OXYGEN SATURATION: 99 % | DIASTOLIC BLOOD PRESSURE: 84 MMHG | HEART RATE: 90 BPM | RESPIRATION RATE: 18 BRPM | WEIGHT: 159 LBS | BODY MASS INDEX: 22.76 KG/M2 | HEIGHT: 70 IN | SYSTOLIC BLOOD PRESSURE: 124 MMHG

## 2024-01-15 DIAGNOSIS — C34.91 MALIGNANT NEOPLASM OF RIGHT LUNG, UNSPECIFIED PART OF LUNG (HCC): Primary | ICD-10-CM

## 2024-01-15 DIAGNOSIS — R51.9 SEVERE HEADACHE: ICD-10-CM

## 2024-01-15 DIAGNOSIS — I10 PRIMARY HYPERTENSION: ICD-10-CM

## 2024-01-15 DIAGNOSIS — Z23 NEED FOR PNEUMOCOCCAL VACCINATION: ICD-10-CM

## 2024-01-15 DIAGNOSIS — F41.1 GAD (GENERALIZED ANXIETY DISORDER): ICD-10-CM

## 2024-01-15 DIAGNOSIS — C79.51 METASTATIC CANCER TO BONE (HCC): ICD-10-CM

## 2024-01-15 PROCEDURE — 90471 IMMUNIZATION ADMIN: CPT | Performed by: INTERNAL MEDICINE

## 2024-01-15 PROCEDURE — 3079F DIAST BP 80-89 MM HG: CPT | Performed by: INTERNAL MEDICINE

## 2024-01-15 PROCEDURE — 3074F SYST BP LT 130 MM HG: CPT | Performed by: INTERNAL MEDICINE

## 2024-01-15 PROCEDURE — 99214 OFFICE O/P EST MOD 30 MIN: CPT | Performed by: INTERNAL MEDICINE

## 2024-01-15 PROCEDURE — 90677 PCV20 VACCINE IM: CPT | Performed by: INTERNAL MEDICINE

## 2024-01-15 RX ORDER — OXYCODONE HYDROCHLORIDE AND ACETAMINOPHEN 5; 325 MG/1; MG/1
TABLET ORAL
COMMUNITY
Start: 2023-12-29

## 2024-01-15 RX ORDER — MORPHINE SULFATE 15 MG/1
TABLET, FILM COATED, EXTENDED RELEASE ORAL
COMMUNITY
Start: 2023-12-29

## 2024-01-15 SDOH — HEALTH STABILITY: PHYSICAL HEALTH: ON AVERAGE, HOW MANY DAYS PER WEEK DO YOU ENGAGE IN MODERATE TO STRENUOUS EXERCISE (LIKE A BRISK WALK)?: 2 DAYS

## 2024-01-15 SDOH — HEALTH STABILITY: PHYSICAL HEALTH: ON AVERAGE, HOW MANY MINUTES DO YOU ENGAGE IN EXERCISE AT THIS LEVEL?: 10 MIN

## 2024-01-15 ASSESSMENT — ENCOUNTER SYMPTOMS
SINUS PRESSURE: 0
VOMITING: 0
ABDOMINAL DISTENTION: 0
TROUBLE SWALLOWING: 0
NAUSEA: 0
WHEEZING: 0
COUGH: 0
EYE REDNESS: 0
COLOR CHANGE: 0
EYE PAIN: 0
CHEST TIGHTNESS: 0
SHORTNESS OF BREATH: 0
BACK PAIN: 0
DIARRHEA: 0
ABDOMINAL PAIN: 0
SORE THROAT: 0
BLOOD IN STOOL: 0
CONSTIPATION: 0

## 2024-01-15 ASSESSMENT — PATIENT HEALTH QUESTIONNAIRE - PHQ9
SUM OF ALL RESPONSES TO PHQ9 QUESTIONS 1 & 2: 3
4. FEELING TIRED OR HAVING LITTLE ENERGY: 2
1. LITTLE INTEREST OR PLEASURE IN DOING THINGS: 2
SUM OF ALL RESPONSES TO PHQ QUESTIONS 1-9: 10
2. FEELING DOWN, DEPRESSED OR HOPELESS: 1
SUM OF ALL RESPONSES TO PHQ QUESTIONS 1-9: 10
SUM OF ALL RESPONSES TO PHQ QUESTIONS 1-9: 10
3. TROUBLE FALLING OR STAYING ASLEEP: 2
9. THOUGHTS THAT YOU WOULD BE BETTER OFF DEAD, OR OF HURTING YOURSELF: 0
SUM OF ALL RESPONSES TO PHQ QUESTIONS 1-9: 10
7. TROUBLE CONCENTRATING ON THINGS, SUCH AS READING THE NEWSPAPER OR WATCHING TELEVISION: 0
5. POOR APPETITE OR OVEREATING: 3
8. MOVING OR SPEAKING SO SLOWLY THAT OTHER PEOPLE COULD HAVE NOTICED. OR THE OPPOSITE, BEING SO FIGETY OR RESTLESS THAT YOU HAVE BEEN MOVING AROUND A LOT MORE THAN USUAL: 0
10. IF YOU CHECKED OFF ANY PROBLEMS, HOW DIFFICULT HAVE THESE PROBLEMS MADE IT FOR YOU TO DO YOUR WORK, TAKE CARE OF THINGS AT HOME, OR GET ALONG WITH OTHER PEOPLE: 1
6. FEELING BAD ABOUT YOURSELF - OR THAT YOU ARE A FAILURE OR HAVE LET YOURSELF OR YOUR FAMILY DOWN: 0

## 2024-01-15 NOTE — ASSESSMENT & PLAN NOTE
worsening depressed mood and irritability.  Informed that fluoxetine/Prozac has better been studied for anger irritability.  However this will entail n weaning off sertraline and starting Prozac.  Patient would prefer to increase sertraline to 75 mg daily, prescriptions sent in today.

## 2024-01-15 NOTE — ASSESSMENT & PLAN NOTE
patient has had consultation with Dr. Laws.  He says he needs new referral paperwork for chemotherapy.  Oncologist management patient's pain  Patient will follow-up with his oncologist.

## 2024-01-15 NOTE — ASSESSMENT & PLAN NOTE
patient has had consultation with Dr. Laws.  He says he needs new referral paperwork for chemotherapy  Patient will follow-up with his oncologist.

## 2024-01-15 NOTE — PROGRESS NOTES
Endocrine: Negative for cold intolerance, heat intolerance and polydipsia.   Genitourinary:  Negative for decreased urine volume, dysuria, flank pain, frequency, hematuria and urgency.   Musculoskeletal:  Negative for arthralgias, back pain, joint swelling, neck pain and neck stiffness.   Skin:  Negative for color change and rash.   Neurological:  Negative for dizziness, weakness, numbness and headaches.   Hematological:  Negative for adenopathy.   Psychiatric/Behavioral:  Negative for behavioral problems, sleep disturbance and suicidal ideas. The patient is not nervous/anxious.        /84 (Site: Left Upper Arm, Position: Sitting, Cuff Size: Medium Adult)   Pulse 90   Resp 18   Ht 1.778 m (5' 10\")   Wt 72.1 kg (159 lb)   SpO2 99%   BMI 22.81 kg/m²    Physical Exam  Constitutional:       General: He is not in acute distress.     Appearance: Normal appearance. He is not ill-appearing.   HENT:      Head: Normocephalic and atraumatic.      Right Ear: Ear canal normal.      Left Ear: Ear canal normal.      Mouth/Throat:      Mouth: Mucous membranes are moist.      Pharynx: No oropharyngeal exudate or posterior oropharyngeal erythema.   Eyes:      Extraocular Movements: Extraocular movements intact.      Conjunctiva/sclera: Conjunctivae normal.      Pupils: Pupils are equal, round, and reactive to light.   Neck:      Vascular: No carotid bruit.   Cardiovascular:      Rate and Rhythm: Normal rate and regular rhythm.      Pulses: Normal pulses.      Heart sounds: Normal heart sounds. No murmur heard.     No gallop.   Pulmonary:      Effort: Pulmonary effort is normal.      Breath sounds: Normal breath sounds. No wheezing, rhonchi or rales.   Abdominal:      General: Abdomen is flat. There is no distension.   Musculoskeletal:         General: No swelling or tenderness. Normal range of motion.      Cervical back: No tenderness.   Lymphadenopathy:      Cervical: No cervical adenopathy.   Skin:     Findings: No

## 2024-01-15 NOTE — ASSESSMENT & PLAN NOTE
Stable  Continue anti-hypertensive medication as documented in your medication list lisinopril 10 mg daily  To verify blood pressure cuff accuracy  To keep outpatient BP log,  counseled on exercise and diet (including DASH diet)  Patient agreed with plan with verbal understanding

## 2024-01-16 ENCOUNTER — TELEPHONE (OUTPATIENT)
Dept: PRIMARY CARE CLINIC | Age: 59
End: 2024-01-16

## 2024-01-16 NOTE — TELEPHONE ENCOUNTER
Patient's wife called, on HIPAA. She had spoke to [facility] and they had not received any referral information from Dr. Quinn as of this morning. I spoke with Norm and he was waiting for paperwork to fill out from [facility] which came this morning via fax. The patient's wife asked that someone give her a call so she knows everything is set for the patient to call and set up an appointment.

## 2024-01-16 NOTE — TELEPHONE ENCOUNTER
Please follow-up.  Let patient know that  has their own procedure/process when it comes to referrals.

## 2024-01-20 ENCOUNTER — HOSPITAL ENCOUNTER (OUTPATIENT)
Dept: MRI IMAGING | Age: 59
Discharge: HOME | End: 2024-01-20
Attending: INTERNAL MEDICINE
Payer: OTHER GOVERNMENT

## 2024-01-20 DIAGNOSIS — C34.11 MALIGNANT NEOPLASM OF UPPER LOBE OF RIGHT LUNG (HCC): ICD-10-CM

## 2024-01-20 PROCEDURE — 70553 MRI BRAIN STEM W/O & W/DYE: CPT

## 2024-01-20 PROCEDURE — A9577 INJ MULTIHANCE: HCPCS | Performed by: INTERNAL MEDICINE

## 2024-01-20 PROCEDURE — 6360000004 HC RX CONTRAST MEDICATION: Performed by: INTERNAL MEDICINE

## 2024-01-20 RX ADMIN — GADOBENATE DIMEGLUMINE 15 ML: 529 INJECTION, SOLUTION INTRAVENOUS at 10:11

## 2024-01-22 ENCOUNTER — HOSPITAL ENCOUNTER (INPATIENT)
Age: 59
LOS: 5 days | Discharge: HOME | End: 2024-01-27
Attending: EMERGENCY MEDICINE | Admitting: INTERNAL MEDICINE
Payer: OTHER GOVERNMENT

## 2024-01-22 ENCOUNTER — APPOINTMENT (OUTPATIENT)
Dept: GENERAL RADIOLOGY | Age: 59
End: 2024-01-22
Payer: OTHER GOVERNMENT

## 2024-01-22 DIAGNOSIS — R07.9 CHEST PAIN, UNSPECIFIED TYPE: Primary | ICD-10-CM

## 2024-01-22 DIAGNOSIS — J18.9 PNEUMONIA DUE TO INFECTIOUS ORGANISM, UNSPECIFIED LATERALITY, UNSPECIFIED PART OF LUNG: ICD-10-CM

## 2024-01-22 DIAGNOSIS — D72.829 LEUKOCYTOSIS, UNSPECIFIED TYPE: ICD-10-CM

## 2024-01-22 PROBLEM — J15.9 BACTERIAL PNEUMONIA: Status: ACTIVE | Noted: 2024-01-22

## 2024-01-22 LAB
ALBUMIN SERPL-MCNC: 2.7 G/DL (ref 3.4–5)
ALBUMIN/GLOB SERPL: 0.7 {RATIO} (ref 1.1–2.2)
ALP SERPL-CCNC: 518 U/L (ref 40–129)
ALT SERPL-CCNC: 64 U/L (ref 10–40)
ANION GAP SERPL CALCULATED.3IONS-SCNC: 13 MMOL/L (ref 3–16)
AST SERPL-CCNC: 45 U/L (ref 15–37)
BACTERIA URNS QL MICRO: NORMAL /HPF
BILIRUB SERPL-MCNC: 0.5 MG/DL (ref 0–1)
BILIRUB UR QL STRIP.AUTO: ABNORMAL
BUN SERPL-MCNC: 14 MG/DL (ref 7–20)
CALCIUM SERPL-MCNC: 10.7 MG/DL (ref 8.3–10.6)
CHLORIDE SERPL-SCNC: 92 MMOL/L (ref 99–110)
CLARITY UR: CLEAR
CO2 SERPL-SCNC: 23 MMOL/L (ref 21–32)
COLOR UR: ABNORMAL
CREAT SERPL-MCNC: <0.5 MG/DL (ref 0.8–1.3)
EPI CELLS #/AREA URNS AUTO: 1 /HPF (ref 0–5)
GFR SERPLBLD CREATININE-BSD FMLA CKD-EPI: >60 ML/MIN/{1.73_M2}
GLUCOSE SERPL-MCNC: 132 MG/DL (ref 70–99)
GLUCOSE UR STRIP.AUTO-MCNC: NEGATIVE MG/DL
HGB UR QL STRIP.AUTO: NEGATIVE
HYALINE CASTS #/AREA URNS AUTO: 1 /LPF (ref 0–8)
KETONES UR STRIP.AUTO-MCNC: NEGATIVE MG/DL
LACTATE BLDV-SCNC: 1.4 MMOL/L (ref 0.4–1.9)
LEUKOCYTE ESTERASE UR QL STRIP.AUTO: NEGATIVE
NITRITE UR QL STRIP.AUTO: NEGATIVE
PH UR STRIP.AUTO: 6 [PH] (ref 5–8)
POTASSIUM SERPL-SCNC: 4.7 MMOL/L (ref 3.5–5.1)
PROCALCITONIN SERPL IA-MCNC: 0.67 NG/ML (ref 0–0.15)
PROT SERPL-MCNC: 6.5 G/DL (ref 6.4–8.2)
PROT UR STRIP.AUTO-MCNC: ABNORMAL MG/DL
RBC CLUMPS #/AREA URNS AUTO: 1 /HPF (ref 0–4)
SODIUM SERPL-SCNC: 128 MMOL/L (ref 136–145)
SP GR UR STRIP.AUTO: 1.02 (ref 1–1.03)
UA COMPLETE W REFLEX CULTURE PNL UR: ABNORMAL
UA DIPSTICK W REFLEX MICRO PNL UR: YES
URN SPEC COLLECT METH UR: ABNORMAL
UROBILINOGEN UR STRIP-ACNC: 1 E.U./DL
WBC #/AREA URNS AUTO: 1 /HPF (ref 0–5)

## 2024-01-22 PROCEDURE — 84145 PROCALCITONIN (PCT): CPT

## 2024-01-22 PROCEDURE — 71045 X-RAY EXAM CHEST 1 VIEW: CPT

## 2024-01-22 PROCEDURE — 99285 EMERGENCY DEPT VISIT HI MDM: CPT

## 2024-01-22 PROCEDURE — 6370000000 HC RX 637 (ALT 250 FOR IP): Performed by: NURSE PRACTITIONER

## 2024-01-22 PROCEDURE — 6370000000 HC RX 637 (ALT 250 FOR IP): Performed by: INTERNAL MEDICINE

## 2024-01-22 PROCEDURE — 6360000002 HC RX W HCPCS: Performed by: INTERNAL MEDICINE

## 2024-01-22 PROCEDURE — 96375 TX/PRO/DX INJ NEW DRUG ADDON: CPT

## 2024-01-22 PROCEDURE — 83605 ASSAY OF LACTIC ACID: CPT

## 2024-01-22 PROCEDURE — 96365 THER/PROPH/DIAG IV INF INIT: CPT

## 2024-01-22 PROCEDURE — 87040 BLOOD CULTURE FOR BACTERIA: CPT

## 2024-01-22 PROCEDURE — 2580000003 HC RX 258: Performed by: EMERGENCY MEDICINE

## 2024-01-22 PROCEDURE — 96367 TX/PROPH/DG ADDL SEQ IV INF: CPT

## 2024-01-22 PROCEDURE — 2060000000 HC ICU INTERMEDIATE R&B

## 2024-01-22 PROCEDURE — 96366 THER/PROPH/DIAG IV INF ADDON: CPT

## 2024-01-22 PROCEDURE — 6360000002 HC RX W HCPCS: Performed by: EMERGENCY MEDICINE

## 2024-01-22 PROCEDURE — 80053 COMPREHEN METABOLIC PANEL: CPT

## 2024-01-22 PROCEDURE — 2580000003 HC RX 258: Performed by: INTERNAL MEDICINE

## 2024-01-22 PROCEDURE — 81001 URINALYSIS AUTO W/SCOPE: CPT

## 2024-01-22 PROCEDURE — 85025 COMPLETE CBC W/AUTO DIFF WBC: CPT

## 2024-01-22 PROCEDURE — 87641 MR-STAPH DNA AMP PROBE: CPT

## 2024-01-22 RX ORDER — BISACODYL 5 MG/1
5 TABLET, DELAYED RELEASE ORAL DAILY PRN
COMMUNITY

## 2024-01-22 RX ORDER — SODIUM CHLORIDE 0.9 % (FLUSH) 0.9 %
5-40 SYRINGE (ML) INJECTION PRN
Status: DISCONTINUED | OUTPATIENT
Start: 2024-01-22 | End: 2024-01-27 | Stop reason: HOSPADM

## 2024-01-22 RX ORDER — ONDANSETRON HYDROCHLORIDE 8 MG/1
8 TABLET, FILM COATED ORAL EVERY 8 HOURS PRN
COMMUNITY

## 2024-01-22 RX ORDER — MORPHINE SULFATE 15 MG/1
15 TABLET, FILM COATED, EXTENDED RELEASE ORAL 2 TIMES DAILY
Status: DISCONTINUED | OUTPATIENT
Start: 2024-01-22 | End: 2024-01-25

## 2024-01-22 RX ORDER — ENOXAPARIN SODIUM 100 MG/ML
40 INJECTION SUBCUTANEOUS DAILY
Status: DISCONTINUED | OUTPATIENT
Start: 2024-01-22 | End: 2024-01-27 | Stop reason: HOSPADM

## 2024-01-22 RX ORDER — MORPHINE SULFATE 4 MG/ML
4 INJECTION, SOLUTION INTRAMUSCULAR; INTRAVENOUS ONCE
Status: COMPLETED | OUTPATIENT
Start: 2024-01-22 | End: 2024-01-22

## 2024-01-22 RX ORDER — SODIUM CHLORIDE 9 MG/ML
INJECTION, SOLUTION INTRAVENOUS CONTINUOUS
Status: DISCONTINUED | OUTPATIENT
Start: 2024-01-22 | End: 2024-01-26

## 2024-01-22 RX ORDER — ACETAMINOPHEN 325 MG/1
650 TABLET ORAL EVERY 6 HOURS PRN
Status: DISCONTINUED | OUTPATIENT
Start: 2024-01-22 | End: 2024-01-27 | Stop reason: HOSPADM

## 2024-01-22 RX ORDER — SODIUM CHLORIDE 9 MG/ML
INJECTION, SOLUTION INTRAVENOUS PRN
Status: DISCONTINUED | OUTPATIENT
Start: 2024-01-22 | End: 2024-01-27 | Stop reason: HOSPADM

## 2024-01-22 RX ORDER — VANCOMYCIN HYDROCHLORIDE 1 G/200ML
1000 INJECTION, SOLUTION INTRAVENOUS ONCE
Status: DISCONTINUED | OUTPATIENT
Start: 2024-01-22 | End: 2024-01-22 | Stop reason: SDUPTHER

## 2024-01-22 RX ORDER — ACETAMINOPHEN 650 MG/1
650 SUPPOSITORY RECTAL EVERY 6 HOURS PRN
Status: DISCONTINUED | OUTPATIENT
Start: 2024-01-22 | End: 2024-01-27 | Stop reason: HOSPADM

## 2024-01-22 RX ORDER — LEVOFLOXACIN 5 MG/ML
750 INJECTION, SOLUTION INTRAVENOUS EVERY 24 HOURS
Status: COMPLETED | OUTPATIENT
Start: 2024-01-22 | End: 2024-01-26

## 2024-01-22 RX ORDER — OXYCODONE HYDROCHLORIDE AND ACETAMINOPHEN 5; 325 MG/1; MG/1
1 TABLET ORAL EVERY 8 HOURS PRN
Status: DISCONTINUED | OUTPATIENT
Start: 2024-01-22 | End: 2024-01-25

## 2024-01-22 RX ORDER — POLYETHYLENE GLYCOL 3350 17 G/17G
17 POWDER, FOR SOLUTION ORAL DAILY PRN
Status: DISCONTINUED | OUTPATIENT
Start: 2024-01-22 | End: 2024-01-27 | Stop reason: HOSPADM

## 2024-01-22 RX ORDER — PROCHLORPERAZINE MALEATE 10 MG
10 TABLET ORAL EVERY 6 HOURS PRN
COMMUNITY

## 2024-01-22 RX ORDER — ATORVASTATIN CALCIUM 20 MG/1
20 TABLET, FILM COATED ORAL DAILY
Status: DISCONTINUED | OUTPATIENT
Start: 2024-01-22 | End: 2024-01-24

## 2024-01-22 RX ORDER — SODIUM CHLORIDE 0.9 % (FLUSH) 0.9 %
5-40 SYRINGE (ML) INJECTION EVERY 12 HOURS SCHEDULED
Status: DISCONTINUED | OUTPATIENT
Start: 2024-01-22 | End: 2024-01-27 | Stop reason: HOSPADM

## 2024-01-22 RX ORDER — LISINOPRIL 10 MG/1
10 TABLET ORAL DAILY
Status: DISCONTINUED | OUTPATIENT
Start: 2024-01-22 | End: 2024-01-27 | Stop reason: HOSPADM

## 2024-01-22 RX ADMIN — SODIUM CHLORIDE: 9 INJECTION, SOLUTION INTRAVENOUS at 19:00

## 2024-01-22 RX ADMIN — ENOXAPARIN SODIUM 40 MG: 100 INJECTION SUBCUTANEOUS at 21:00

## 2024-01-22 RX ADMIN — MORPHINE SULFATE 4 MG: 4 INJECTION, SOLUTION INTRAMUSCULAR; INTRAVENOUS at 12:53

## 2024-01-22 RX ADMIN — SERTRALINE HYDROCHLORIDE 75 MG: 50 TABLET ORAL at 21:03

## 2024-01-22 RX ADMIN — CEFEPIME 2000 MG: 2 INJECTION, POWDER, FOR SOLUTION INTRAVENOUS at 23:10

## 2024-01-22 RX ADMIN — VANCOMYCIN HYDROCHLORIDE 1250 MG: 1.25 INJECTION, POWDER, LYOPHILIZED, FOR SOLUTION INTRAVENOUS at 15:32

## 2024-01-22 RX ADMIN — CEFEPIME 1000 MG: 1 INJECTION, POWDER, FOR SOLUTION INTRAMUSCULAR; INTRAVENOUS at 15:00

## 2024-01-22 RX ADMIN — ATORVASTATIN CALCIUM 20 MG: 20 TABLET, FILM COATED ORAL at 20:59

## 2024-01-22 RX ADMIN — MORPHINE SULFATE 15 MG: 15 TABLET, FILM COATED, EXTENDED RELEASE ORAL at 19:30

## 2024-01-22 RX ADMIN — LEVOFLOXACIN 750 MG: 5 INJECTION, SOLUTION INTRAVENOUS at 20:29

## 2024-01-22 ASSESSMENT — PAIN SCALES - GENERAL
PAINLEVEL_OUTOF10: 7
PAINLEVEL_OUTOF10: 6
PAINLEVEL_OUTOF10: 6
PAINLEVEL_OUTOF10: 5
PAINLEVEL_OUTOF10: 8

## 2024-01-22 ASSESSMENT — PAIN DESCRIPTION - PAIN TYPE
TYPE: CHRONIC PAIN

## 2024-01-22 ASSESSMENT — PAIN DESCRIPTION - LOCATION
LOCATION: ABDOMEN
LOCATION: CHEST
LOCATION: BACK

## 2024-01-22 ASSESSMENT — PAIN DESCRIPTION - ORIENTATION
ORIENTATION: UPPER
ORIENTATION: UPPER

## 2024-01-22 ASSESSMENT — LIFESTYLE VARIABLES
HOW MANY STANDARD DRINKS CONTAINING ALCOHOL DO YOU HAVE ON A TYPICAL DAY: PATIENT DOES NOT DRINK
HOW OFTEN DO YOU HAVE A DRINK CONTAINING ALCOHOL: NEVER

## 2024-01-22 ASSESSMENT — PAIN - FUNCTIONAL ASSESSMENT: PAIN_FUNCTIONAL_ASSESSMENT: 0-10

## 2024-01-22 NOTE — ED PROVIDER NOTES
[facility] EMERGENCY DEPARTMENT  EMERGENCY DEPARTMENT ENCOUNTER        Pt Name: Sarabjit Novoa  MRN: [id number]  Birthdate 1965  Date of evaluation: 1/22/2024  Provider: Jevon Stein PA-C  PCP: Markos Quinn MD  Note Started: 3:13 PM EST 1/22/24       I have seen and evaluated this patient with my supervising physician Jose Daniel Ibanez MD.      CHIEF COMPLAINT       Chief Complaint   Patient presents with    Shortness of Breath     Pt w c/o SOB and severe pain (6/10) d/t to lung cancer stage 4. Pt O2 97% on RA. No resp distress       HISTORY OF PRESENT ILLNESS: 1 or more Elements     History From: patient    Sarabjit Novoa is a 58 y.o. male with past medical history of stage IV lung cancer who presents with worsening weakness, shortness of breath, right-sided chest pain.  Patient was seen by Dr. Laws, directed here for admission for pain control, shortness of breath.  Patient having severe right-sided pain with breathing, movement, has known metastasis to this area.  Denies recent trauma, fever, cough, sick contacts.    Nursing Notes were all reviewed and agreed with or any disagreements were addressed in the HPI.    REVIEW OF SYSTEMS :      Review of Systems   All other systems reviewed and are negative.      Positives and Pertinent negatives as per HPI.       PAST MEDICAL HISTORY    has a past medical history of Arthritis, Cancer (HCC), Hyperlipidemia, and Hypertension.     SURGICAL HISTORY     Past Surgical History:   Procedure Laterality Date    CT NEEDLE BIOPSY LUNG PERCUTANEOUS  12/21/2023    CT NEEDLE BIOPSY LUNG PERCUTANEOUS 12/21/2023 [facility] CT SCAN       CURRENTMEDICATIONS       Previous Medications    ACETAMINOPHEN (AMINOFEN) 325 MG TABLET    Take 2 tablets by mouth every 6 hours as needed for Pain    ATORVASTATIN (LIPITOR) 20 MG TABLET    Take 1 tablet by mouth daily    LISINOPRIL (PRINIVIL;ZESTRIL) 10 MG TABLET    Take 1 tablet by mouth daily    MELOXICAM (MOBIC) 15

## 2024-01-22 NOTE — ACP (ADVANCE CARE PLANNING)
Advanced Care Planning Note.    Purpose of Encounter: Advanced care planning in light of hospitalization  Parties In Attendance: Patient,    Decisional Capacity: Yes  Subjective: Patient  understand that this conversation is to address long term care goal  Objective: Admitted to the hospital with sepsis secondary to pneumonia and metastatic non-small cell lung cancer  Goals of Care Determination: Patient would not pursue CPR and Intubation if required.   Code Status: DNR CCA DNI  Time spent on Advanced care Plannin minutes  Advanced Care Planning Documents: documented patient's wishes, would like Wife  to make medical decisions if unable to make decisions    Maria Ines Rivera MD  2024 6:19 PM

## 2024-01-22 NOTE — ED NOTES
ED TO INPATIENT SBAR HANDOFF    Patient Name: Sarabjit Novoa   :  1965  58 y.o.   MRN:  [id number]  Preferred Name  Sarabjit   ED Room #:  ED-0007/07  Family/Caregiver Present no   Restraints no   Sitter no   Sepsis Risk Score Sepsis Risk Score: 9.22    Situation  Code Status: No Order No additional code details.    Allergies: Codeine and Norco [hydrocodone-acetaminophen]  Weight: Patient Vitals for the past 96 hrs (Last 3 readings):   Weight   24 1205 68 kg (150 lb)     Arrived from: home  Chief Complaint:   Chief Complaint   Patient presents with    Shortness of Breath     Pt w c/o SOB and severe pain (6/10) d/t to lung cancer stage 4. Pt O2 97% on RA. No resp distress     Hospital Problem/Diagnosis:  Principal Problem:    Bacterial pneumonia  Resolved Problems:    * No resolved hospital problems. *    Imaging:   XR CHEST PORTABLE   Final Result   Progressive pulmonary metastatic disease with associated right basilar   segmental atelectasis versus pneumonia.           Abnormal labs:   Abnormal Labs Reviewed   CBC WITH AUTO DIFFERENTIAL - Abnormal; Notable for the following components:       Result Value    WBC 60.6 (*)     RBC 3.69 (*)     Hemoglobin 9.8 (*)     Hematocrit 31.6 (*)     Platelets 668 (*)     Neutrophils Absolute 56.4 (*)     All other components within normal limits    Narrative:     CALL  [facility] tel. [phone],  Hematology results called to and read back by MISBAH Rodriguez, 2024  13:54, by [employer]   COMPREHENSIVE METABOLIC PANEL W/ REFLEX TO MG FOR LOW K - Abnormal; Notable for the following components:    Sodium 128 (*)     Chloride 92 (*)     Glucose 132 (*)     Creatinine <0.5 (*)     Calcium 10.7 (*)     Albumin 2.7 (*)     Albumin/Globulin Ratio 0.7 (*)     Alkaline Phosphatase 518 (*)     ALT 64 (*)     AST 45 (*)     All other components within normal limits   URINALYSIS WITH REFLEX TO CULTURE - Abnormal; Notable for the following components:    Color, UA DARK

## 2024-01-22 NOTE — H&P
HOSPITALISTS HISTORY AND PHYSICAL    1/22/2024 6:16 PM    Patient Information:  SARABJIT ETIENNE is a 58 y.o. male [id number]  PCP:  Markos Quinn MD (Tel: [phone] )    Chief complaint:    Chief Complaint   Patient presents with    Shortness of Breath     Pt w c/o SOB and severe pain (6/10) d/t to lung cancer stage 4. Pt O2 97% on RA. No resp distress       History of Present Illness:  Sarabjit Etienne is a 58 y.o. male who been diagnosed with metastatic non-small cell lung cancer presenting with increasing generalized body pain along with cough productive of blackish sputum chills and sweats no fever denies any sick contacts and increasing shortness of breath patient does not smoke currently      REVIEW OF SYSTEMS:     ENT: Negative for rhinorrhea, epistaxis, hoarseness, sore throat.  Cardiovascular: Negative for chest pain, palpitations   Gastrointestinal: Negative for nausea, vomiting, diarrhea  Genitourinary: Negative for polyuria, dysuria   Hematologic/Lymphatic: Negative for bleeding tendency, easy bruising  Musculoskeletal: Negative for myalgias and arthralgias  Neurologic: Negative for confusion,dysarthria.  Skin: Negative for itching,rash  Psychiatric: Negative for depression,anxiety, agitation.  Endocrine: Negative for polydipsia,polyuria,heat /cold intolerance.    Past Medical History:   has a past medical history of Arthritis, Cancer (HCC), Hyperlipidemia, and Hypertension.     Past Surgical History:   has a past surgical history that includes CT NEEDLE BIOPSY LUNG PERCUTANEOUS (12/21/2023).     Medications:  No current facility-administered medications on file prior to encounter.     Current Outpatient Medications on File Prior to Encounter   Medication Sig Dispense Refill    prochlorperazine (COMPAZINE) 10 MG tablet Take 1 tablet by mouth every 6 hours as needed      ondansetron (ZOFRAN) 8 MG tablet Take  BS+  EXT: no edema  Neurology: no gross focal deficts  Psychiatry: Appropriate affect. Not agitated  Skin: Warm, dry, no rashes appreciated    Labs:  CBC:   Lab Results   Component Value Date/Time    WBC 60.6 01/22/2024 12:53 PM    RBC 3.69 01/22/2024 12:53 PM    HGB 9.8 01/22/2024 12:53 PM    HCT 31.6 01/22/2024 12:53 PM    MCV 85.6 01/22/2024 12:53 PM    MCH 26.5 01/22/2024 12:53 PM    MCHC 31.0 01/22/2024 12:53 PM    RDW 13.6 01/22/2024 12:53 PM     01/22/2024 12:53 PM    MPV 7.5 01/22/2024 12:53 PM     BMP:    Lab Results   Component Value Date/Time     01/22/2024 12:53 PM    K 4.7 01/22/2024 12:53 PM    CL 92 01/22/2024 12:53 PM    CO2 23 01/22/2024 12:53 PM    BUN 14 01/22/2024 12:53 PM    CREATININE <0.5 01/22/2024 12:53 PM    CALCIUM 10.7 01/22/2024 12:53 PM    GFRAA >60 05/31/2022 03:53 PM    LABGLOM >60 01/22/2024 12:53 PM    GLUCOSE 132 01/22/2024 12:53 PM     XR CHEST PORTABLE   Final Result   Progressive pulmonary metastatic disease with associated right basilar   segmental atelectasis versus pneumonia.             Recent imaging reviewed    Problem List  Principal Problem:    Bacterial pneumonia  Resolved Problems:    * No resolved hospital problems. *        Assessment/Plan:   Sepsis secondary to pna  - iv atb  X- cutlures  - mrsa swab  - pulm input    Mestatic non small cell lung cancer  - heme consult  - continue pain reigmen    Hyponatremia ivf repeat bmp jerod m      DVT prophylaxis lovenox  Code status dnr cca        Admit as inpatient I anticipate hospitalization spanning more than two midnights for investigation and treatment of the above medically necessary diagnoses.    Please note that some part of this chart was generated using Dragon dictation software. Although every effort was made to ensure the accuracy of this automated transcription, some errors in transcription may have occurred inadvertently. If you may need any clarification, please do not hesitate to contact me through

## 2024-01-22 NOTE — ED PROVIDER NOTES
This patient was seen by the Mid-Level Provider. I have seen and examined the patient, agree with the workup, evaluation, management and diagnosis. Care plan has been discussed. My assessment reveals a 58-year-old male who presents with generalized weakness and chest pain.  This is a 58-year-old male recently diagnosed with metastatic lung cancer.  The patient presents today with increasing weakness and pain in the area.  He denies any significant cough or sputum production.  He denies any fevers or chills.  He has also had some shortness of breath.  He describes the pain as severe.          Radiology results:    XR CHEST PORTABLE   Final Result   Progressive pulmonary metastatic disease with associated right basilar   segmental atelectasis versus pneumonia.               LABS:    Labs Reviewed   CBC WITH AUTO DIFFERENTIAL - Abnormal; Notable for the following components:       Result Value    WBC 60.6 (*)     RBC 3.69 (*)     Hemoglobin 9.8 (*)     Hematocrit 31.6 (*)     Platelets 668 (*)     Neutrophils Absolute 56.4 (*)     All other components within normal limits    Narrative:     CALL  [facility] tel. [phone],  Hematology results called to and read back by MISBAH Rodriguez, 01/22/2024  13:54, by e|tab   COMPREHENSIVE METABOLIC PANEL W/ REFLEX TO MG FOR LOW K - Abnormal; Notable for the following components:    Sodium 128 (*)     Chloride 92 (*)     Glucose 132 (*)     Creatinine <0.5 (*)     Calcium 10.7 (*)     Albumin 2.7 (*)     Albumin/Globulin Ratio 0.7 (*)     Alkaline Phosphatase 518 (*)     ALT 64 (*)     AST 45 (*)     All other components within normal limits   CULTURE, BLOOD 1   CULTURE, BLOOD 2   LACTATE, SEPSIS   LACTATE, SEPSIS   URINALYSIS WITH REFLEX TO CULTURE               Exam:    Well-nourished male who appears to be uncomfortable but no acute distress.  Chest shows some decreased breath sounds on the right.  Heart was regular rate rhythm with no murmurs rubs gallops.      Medical  decision makin-year-old male with a history of known metastatic lung cancer presents with increasing weakness and pain.  The patient's chest x-ray does show some increased mass load versus pneumonia.  In addition, the patient's medical workup shows a significant leukocytosis of 60,000.  A septic workup is currently being obtained on the patient that is pending.  Antibiotics will be ordered.  Fluids will be ordered if he is septic.  The patient will be admitted for further care.  Please see chart for final disposition.    Sepsis evaluation: Pending      FINAL IMPRESSION:    1. Chest pain, unspecified type    2. Leukocytosis, unspecified type            Jose Daniel Ibanez MD  [phone]

## 2024-01-22 NOTE — ED NOTES
Patient oriented to room and ED throughput process.  Safety measures with ED bed locked in lowest position and call light in reach.  Patient aware of how to reach staff with questions/concerns.

## 2024-01-22 NOTE — PROGRESS NOTES
Pharmacy Home Medication Reconciliation Note    A medication reconciliation has been completed for Sarabjit Novoa 1965    Pharmacy: [facility] [id number] [place], [place]  Information provided by: patient    The patient's home medication list is as follows:  No current facility-administered medications on file prior to encounter.     Current Outpatient Medications on File Prior to Encounter   Medication Sig Dispense Refill    prochlorperazine (COMPAZINE) 10 MG tablet Take 1 tablet by mouth every 6 hours as needed      ondansetron (ZOFRAN) 8 MG tablet Take 1 tablet by mouth every 8 hours as needed for Nausea or Vomiting      bisacodyl (DULCOLAX) 5 MG EC tablet Take 1 tablet by mouth daily as needed for Constipation      morphine (MS CONTIN) 15 MG extended release tablet Take 1 tablet by mouth 2 times daily.      oxyCODONE-acetaminophen (PERCOCET) 5-325 MG per tablet Take 1 tablet by mouth every 8 hours as needed for Pain.      sertraline (ZOLOFT) 50 MG tablet Take 1.5 tablets by mouth daily 135 tablet 1    meloxicam (MOBIC) 15 MG tablet TAKE 1 TABLET BY MOUTH DAILY 90 tablet 0    lisinopril (PRINIVIL;ZESTRIL) 10 MG tablet Take 1 tablet by mouth daily 90 tablet 1    atorvastatin (LIPITOR) 20 MG tablet Take 1 tablet by mouth daily 90 tablet 1    acetaminophen (AMINOFEN) 325 MG tablet Take 2 tablets by mouth every 6 hours as needed for Pain (Patient not taking: Reported on 1/22/2024) 120 tablet 3       Patient is no longer taking Tylenol.    Of note, patient has not taken any home meds today other than Meloxicam this morning.    Timing of last doses updated.    Thank you,  Jen Barkley, SHAMIKAhT

## 2024-01-23 ENCOUNTER — APPOINTMENT (OUTPATIENT)
Dept: CT IMAGING | Age: 59
End: 2024-01-23
Payer: OTHER GOVERNMENT

## 2024-01-23 ENCOUNTER — APPOINTMENT (OUTPATIENT)
Dept: MRI IMAGING | Age: 59
End: 2024-01-23
Payer: OTHER GOVERNMENT

## 2024-01-23 PROBLEM — E43 SEVERE MALNUTRITION (HCC): Status: ACTIVE | Noted: 2024-01-23

## 2024-01-23 LAB
ANION GAP SERPL CALCULATED.3IONS-SCNC: 13 MMOL/L (ref 3–16)
BASOPHILS # BLD: 0 K/UL (ref 0–0.2)
BASOPHILS # BLD: 0 K/UL (ref 0–0.2)
BASOPHILS NFR BLD: 0 %
BASOPHILS NFR BLD: 0 %
BUN SERPL-MCNC: 16 MG/DL (ref 7–20)
CALCIUM SERPL-MCNC: 10.1 MG/DL (ref 8.3–10.6)
CHLORIDE SERPL-SCNC: 95 MMOL/L (ref 99–110)
CO2 SERPL-SCNC: 22 MMOL/L (ref 21–32)
CREAT SERPL-MCNC: 0.6 MG/DL (ref 0.8–1.3)
DEPRECATED RDW RBC AUTO: 13.6 % (ref 12.4–15.4)
DEPRECATED RDW RBC AUTO: 13.7 % (ref 12.4–15.4)
EOSINOPHIL # BLD: 0 K/UL (ref 0–0.6)
EOSINOPHIL # BLD: 0.6 K/UL (ref 0–0.6)
EOSINOPHIL NFR BLD: 0 %
EOSINOPHIL NFR BLD: 1 %
GFR SERPLBLD CREATININE-BSD FMLA CKD-EPI: >60 ML/MIN/{1.73_M2}
GLUCOSE SERPL-MCNC: 130 MG/DL (ref 70–99)
HCT VFR BLD AUTO: 27.9 % (ref 40.5–52.5)
HCT VFR BLD AUTO: 31.6 % (ref 40.5–52.5)
HGB BLD-MCNC: 9.1 G/DL (ref 13.5–17.5)
HGB BLD-MCNC: 9.8 G/DL (ref 13.5–17.5)
LYMPHOCYTES # BLD: 1.7 K/UL (ref 1–5.1)
LYMPHOCYTES # BLD: 2.4 K/UL (ref 1–5.1)
LYMPHOCYTES NFR BLD: 3 %
LYMPHOCYTES NFR BLD: 4 %
MCH RBC QN AUTO: 26.5 PG (ref 26–34)
MCH RBC QN AUTO: 27.6 PG (ref 26–34)
MCHC RBC AUTO-ENTMCNC: 31 G/DL (ref 31–36)
MCHC RBC AUTO-ENTMCNC: 32.4 G/DL (ref 31–36)
MCV RBC AUTO: 85 FL (ref 80–100)
MCV RBC AUTO: 85.6 FL (ref 80–100)
MONOCYTES # BLD: 1.2 K/UL (ref 0–1.3)
MONOCYTES # BLD: 1.2 K/UL (ref 0–1.3)
MONOCYTES NFR BLD: 2 %
MONOCYTES NFR BLD: 2 %
MRSA DNA SPEC QL NAA+PROBE: NORMAL
NEUTROPHILS # BLD: 55.3 K/UL (ref 1.7–7.7)
NEUTROPHILS # BLD: 56.4 K/UL (ref 1.7–7.7)
NEUTROPHILS NFR BLD: 92 %
NEUTROPHILS NFR BLD: 94 %
NEUTS BAND NFR BLD MANUAL: 1 % (ref 0–7)
NEUTS BAND NFR BLD MANUAL: 1 % (ref 0–7)
PATH INTERP BLD-IMP: NO
PATH INTERP BLD-IMP: NORMAL
PATH INTERP BLD-IMP: YES
PLATELET # BLD AUTO: 583 K/UL (ref 135–450)
PLATELET # BLD AUTO: 668 K/UL (ref 135–450)
PLATELET BLD QL SMEAR: ABNORMAL
PLATELET BLD QL SMEAR: ABNORMAL
PMV BLD AUTO: 7.5 FL (ref 5–10.5)
PMV BLD AUTO: 7.5 FL (ref 5–10.5)
POTASSIUM SERPL-SCNC: 4.2 MMOL/L (ref 3.5–5.1)
RBC # BLD AUTO: 3.28 M/UL (ref 4.2–5.9)
RBC # BLD AUTO: 3.69 M/UL (ref 4.2–5.9)
RBC MORPH BLD: NORMAL
SLIDE REVIEW: ABNORMAL
SLIDE REVIEW: ABNORMAL
SODIUM SERPL-SCNC: 130 MMOL/L (ref 136–145)
WBC # BLD AUTO: 58.2 K/UL (ref 4–11)
WBC # BLD AUTO: 60.6 K/UL (ref 4–11)

## 2024-01-23 PROCEDURE — 6370000000 HC RX 637 (ALT 250 FOR IP): Performed by: NURSE PRACTITIONER

## 2024-01-23 PROCEDURE — 2060000000 HC ICU INTERMEDIATE R&B

## 2024-01-23 PROCEDURE — 70491 CT SOFT TISSUE NECK W/DYE: CPT

## 2024-01-23 PROCEDURE — 97530 THERAPEUTIC ACTIVITIES: CPT

## 2024-01-23 PROCEDURE — 97162 PT EVAL MOD COMPLEX 30 MIN: CPT

## 2024-01-23 PROCEDURE — 71260 CT THORAX DX C+: CPT

## 2024-01-23 PROCEDURE — 6370000000 HC RX 637 (ALT 250 FOR IP): Performed by: INTERNAL MEDICINE

## 2024-01-23 PROCEDURE — 99255 IP/OBS CONSLTJ NEW/EST HI 80: CPT | Performed by: INTERNAL MEDICINE

## 2024-01-23 PROCEDURE — 6360000002 HC RX W HCPCS: Performed by: INTERNAL MEDICINE

## 2024-01-23 PROCEDURE — 94761 N-INVAS EAR/PLS OXIMETRY MLT: CPT

## 2024-01-23 PROCEDURE — 85025 COMPLETE CBC W/AUTO DIFF WBC: CPT

## 2024-01-23 PROCEDURE — 97166 OT EVAL MOD COMPLEX 45 MIN: CPT

## 2024-01-23 PROCEDURE — A9577 INJ MULTIHANCE: HCPCS | Performed by: INTERNAL MEDICINE

## 2024-01-23 PROCEDURE — 70543 MRI ORBT/FAC/NCK W/O &W/DYE: CPT

## 2024-01-23 PROCEDURE — 6360000002 HC RX W HCPCS: Performed by: EMERGENCY MEDICINE

## 2024-01-23 PROCEDURE — 80048 BASIC METABOLIC PNL TOTAL CA: CPT

## 2024-01-23 PROCEDURE — 6360000004 HC RX CONTRAST MEDICATION: Performed by: INTERNAL MEDICINE

## 2024-01-23 PROCEDURE — 94640 AIRWAY INHALATION TREATMENT: CPT

## 2024-01-23 PROCEDURE — 36415 COLL VENOUS BLD VENIPUNCTURE: CPT

## 2024-01-23 PROCEDURE — 2580000003 HC RX 258: Performed by: EMERGENCY MEDICINE

## 2024-01-23 PROCEDURE — 6360000004 HC RX CONTRAST MEDICATION: Performed by: STUDENT IN AN ORGANIZED HEALTH CARE EDUCATION/TRAINING PROGRAM

## 2024-01-23 RX ORDER — FOLIC ACID 1 MG/1
1 TABLET ORAL DAILY
Status: DISCONTINUED | OUTPATIENT
Start: 2024-01-23 | End: 2024-01-24

## 2024-01-23 RX ORDER — IPRATROPIUM BROMIDE AND ALBUTEROL SULFATE 2.5; .5 MG/3ML; MG/3ML
1 SOLUTION RESPIRATORY (INHALATION)
Status: DISCONTINUED | OUTPATIENT
Start: 2024-01-23 | End: 2024-01-27 | Stop reason: HOSPADM

## 2024-01-23 RX ORDER — IPRATROPIUM BROMIDE AND ALBUTEROL SULFATE 2.5; .5 MG/3ML; MG/3ML
1 SOLUTION RESPIRATORY (INHALATION)
Status: DISCONTINUED | OUTPATIENT
Start: 2024-01-23 | End: 2024-01-23

## 2024-01-23 RX ORDER — CYANOCOBALAMIN 1000 UG/ML
1000 INJECTION, SOLUTION INTRAMUSCULAR; SUBCUTANEOUS ONCE
Status: COMPLETED | OUTPATIENT
Start: 2024-01-23 | End: 2024-01-23

## 2024-01-23 RX ORDER — IPRATROPIUM BROMIDE AND ALBUTEROL SULFATE 2.5; .5 MG/3ML; MG/3ML
1 SOLUTION RESPIRATORY (INHALATION) EVERY 4 HOURS PRN
Status: DISCONTINUED | OUTPATIENT
Start: 2024-01-23 | End: 2024-01-27 | Stop reason: HOSPADM

## 2024-01-23 RX ADMIN — LEVOFLOXACIN 750 MG: 5 INJECTION, SOLUTION INTRAVENOUS at 21:05

## 2024-01-23 RX ADMIN — OXYCODONE AND ACETAMINOPHEN 1 TABLET: 5; 325 TABLET ORAL at 01:22

## 2024-01-23 RX ADMIN — FOLIC ACID 1 MG: 1 TABLET ORAL at 09:37

## 2024-01-23 RX ADMIN — CEFEPIME 2000 MG: 2 INJECTION, POWDER, FOR SOLUTION INTRAVENOUS at 06:31

## 2024-01-23 RX ADMIN — CYANOCOBALAMIN 1000 MCG: 1000 INJECTION, SOLUTION INTRAMUSCULAR; SUBCUTANEOUS at 09:36

## 2024-01-23 RX ADMIN — LISINOPRIL 10 MG: 10 TABLET ORAL at 09:38

## 2024-01-23 RX ADMIN — IOPAMIDOL 75 ML: 755 INJECTION, SOLUTION INTRAVENOUS at 11:19

## 2024-01-23 RX ADMIN — GADOBENATE DIMEGLUMINE 15 ML: 529 INJECTION, SOLUTION INTRAVENOUS at 18:13

## 2024-01-23 RX ADMIN — MORPHINE SULFATE 15 MG: 15 TABLET, FILM COATED, EXTENDED RELEASE ORAL at 20:58

## 2024-01-23 RX ADMIN — MORPHINE SULFATE 15 MG: 15 TABLET, FILM COATED, EXTENDED RELEASE ORAL at 09:39

## 2024-01-23 RX ADMIN — SERTRALINE HYDROCHLORIDE 75 MG: 50 TABLET ORAL at 09:38

## 2024-01-23 RX ADMIN — IPRATROPIUM BROMIDE AND ALBUTEROL SULFATE 1 DOSE: .5; 3 SOLUTION RESPIRATORY (INHALATION) at 13:40

## 2024-01-23 RX ADMIN — ENOXAPARIN SODIUM 40 MG: 100 INJECTION SUBCUTANEOUS at 09:39

## 2024-01-23 RX ADMIN — ATORVASTATIN CALCIUM 20 MG: 20 TABLET, FILM COATED ORAL at 09:38

## 2024-01-23 ASSESSMENT — PAIN SCALES - GENERAL
PAINLEVEL_OUTOF10: 3
PAINLEVEL_OUTOF10: 7
PAINLEVEL_OUTOF10: 4
PAINLEVEL_OUTOF10: 5
PAINLEVEL_OUTOF10: 5
PAINLEVEL_OUTOF10: 6
PAINLEVEL_OUTOF10: 5

## 2024-01-23 ASSESSMENT — PAIN DESCRIPTION - ORIENTATION
ORIENTATION: MID

## 2024-01-23 ASSESSMENT — PAIN DESCRIPTION - LOCATION
LOCATION: ABDOMEN
LOCATION: ABDOMEN
LOCATION: CHEST

## 2024-01-23 ASSESSMENT — PAIN DESCRIPTION - DESCRIPTORS: DESCRIPTORS: ACHING

## 2024-01-23 NOTE — RT PROTOCOL NOTE
RT Inhaler-Nebulizer Bronchodilator Protocol Note    There is a bronchodilator order in the chart from a provider indicating to follow the RT Bronchodilator Protocol and there is an “Initiate RT Inhaler-Nebulizer Bronchodilator Protocol” order as well (see protocol at bottom of note).    CXR Findings:  XR CHEST PORTABLE    Result Date: 1/22/2024  Progressive pulmonary metastatic disease with associated right basilar segmental atelectasis versus pneumonia.       The findings from the last RT Protocol Assessment were as follows:   History Pulmonary Disease: Smoker 15 pack years or more  Respiratory Pattern: Regular pattern and RR 12-20 bpm  Breath Sounds: Slightly diminished and/or crackles  Cough: Weak, non-productive  Indication for Bronchodilator Therapy:    Bronchodilator Assessment Score: 6    Aerosolized bronchodilator medication orders have been revised according to the RT Inhaler-Nebulizer Bronchodilator Protocol below.    Respiratory Therapist to perform RT Therapy Protocol Assessment initially then follow the protocol.  Repeat RT Therapy Protocol Assessment PRN for score 0-3 or on second treatment, BID, and PRN for scores above 3.    No Indications - adjust the frequency to every 6 hours PRN wheezing or bronchospasm, if no treatments needed after 48 hours then discontinue using Per Protocol order mode.     If indication present, adjust the RT bronchodilator orders based on the Bronchodilator Assessment Score as indicated below.  Use Inhaler orders unless patient has one or more of the following: on home nebulizer, not able to hold breath for 10 seconds, is not alert and oriented, cannot activate and use MDI correctly, or respiratory rate 25 breaths per minute or more, then use the equivalent nebulizer order(s) with same Frequency and PRN reasons based on the score.  If a patient is on this medication at home then do not decrease Frequency below that used at home.    0-3 - enter or revise RT bronchodilator  order(s) to equivalent RT Bronchodilator order with Frequency of every 4 hours PRN for wheezing or increased work of breathing using Per Protocol order mode.        4-6 - enter or revise RT Bronchodilator order(s) to two equivalent RT bronchodilator orders with one order with BID Frequency and one order with Frequency of every 4 hours PRN wheezing or increased work of breathing using Per Protocol order mode.        7-10 - enter or revise RT Bronchodilator order(s) to two equivalent RT bronchodilator orders with one order with TID Frequency and one order with Frequency of every 4 hours PRN wheezing or increased work of breathing using Per Protocol order mode.       11-13 - enter or revise RT Bronchodilator order(s) to one equivalent RT bronchodilator order with QID Frequency and an Albuterol order with Frequency of every 4 hours PRN wheezing or increased work of breathing using Per Protocol order mode.      Greater than 13 - enter or revise RT Bronchodilator order(s) to one equivalent RT bronchodilator order with every 4 hours Frequency and an Albuterol order with Frequency of every 2 hours PRN wheezing or increased work of breathing using Per Protocol order mode.     RT to enter RT Home Evaluation for COPD & MDI Assessment order using Per Protocol order mode.    Electronically signed by Charlene Vargas RCP on 1/23/2024 at 1:42 PM

## 2024-01-23 NOTE — PROGRESS NOTES
[facility] - Inpatient Rehabilitation Department   Phone: [phone]    Occupational Therapy    [x] Initial Evaluation            [] Daily Treatment Note         [] Discharge Summary      Patient: Sarabjit Novoa   : 1965   MRN: [id number]   Date of Service:  2024    Admitting Diagnosis:  Bacterial pneumonia  Current Admission Summary:  58 y.o. male who been diagnosed with metastatic non-small cell lung cancer presenting with increasing generalized body pain along with cough productive of blackish sputum chills and sweats no fever denies any sick contacts and increasing shortness of breath patient does not smoke currently     Past Medical History:  has a past medical history of Arthritis, Cancer (HCC), Hyperlipidemia, and Hypertension.  Past Surgical History:  has a past surgical history that includes CT NEEDLE BIOPSY LUNG PERCUTANEOUS (2023).    Discharge Recommendations: Sarabjit Novoa scored a 15/24 on the AM-PAC ADL Inpatient form. Current research shows that an AM-PAC score of 18 or greater is typically associated with a discharge to the patient's home setting. Based on the patient's AM-PAC score, and their current ADL deficits, it is recommended that the patient have 2-3 sessions per week of Occupational Therapy at d/c to increase the patient's independence.  At this time, this patient demonstrates the endurance and safety to discharge home with HHOT (home vs OP services) and a follow up treatment frequency of 2-3x/wk.   Please see assessment section for further patient specific details.    If patient discharges prior to next session this note will serve as a discharge summary.  Please see below for the latest assessment towards goals.       DME Required For Discharge: no DME required at discharge    Precautions/Restrictions: high fall risk  Weight Bearing Restrictions: no restrictions  [] Right Upper Extremity  [] Left Upper Extremity [] Right Lower Extremity  [] Left Lower  supervision  Scooting: supervision  Comments: completed bed mobility independently with moderate chest pain  Transfers:  Sit to stand transfer:stand by assistance  Stand to sit transfer: stand by assistance  Stand step transfer: stand by assistance    Functional Mobility  No functional mobility completed on this date secondary to pain in chest and arrival of transport to testing .  Balance:  Static Sitting Balance: fair (+): maintains balance at SBA/supervision without use of UE support  Dynamic Sitting Balance: fair (+): maintains balance at SBA/supervision without use of UE support  Static Standing Balance: fair: maintains balance at CGA without use of UE support  Dynamic Standing Balance: fair: maintains balance at CGA without use of UE support  Comments:    Other Therapeutic Interventions    Functional Outcomes  AM-PAC Inpatient Daily Activity Raw Score: 15    Cognition  Overall Cognitive Status: Impaired  Arousal/Alertness: appropriate responses to stimuli  Following Commands: follows all commands without difficulty  Memory: decreased recall of recent events  Safety Judgement: decreased awareness of need for assistance  Insights: decreased awareness of deficits  Orientation:    alert and oriented x 4  Command Following:   WFL     Education  Barriers To Learning: cognition  Patient Education: patient and caregiver educated on OT role and benefits, ADL adaptive strategies, discharge recommendations  Learning Assessment:  patient verbalizes understanding, would benefit from continued reinforcement    Assessment  Activity Tolerance: tolerated bed mobility tasks and stand step transfer into recliner chair from bed with moderate fatigue.  Impairments Requiring Therapeutic Intervention: decreased functional mobility, decreased ADL status, decreased strength, decreased cognition  Prognosis: fair  Clinical Assessment: Pt would benefit from further OT services addressing lack of strength, endurance, and independence in

## 2024-01-23 NOTE — PROGRESS NOTES
[facility]    1/23/2024 12:30 PM        Name: Sarabjit Novoa .              Admitted: 1/22/2024  Primary Care Provider: Markos Quinn MD (Tel: [phone])        Subjective:    Lying bed pain improved shortness of breath improving is still having a cough    Reviewed interval ancillary notes    Current Medications  folic acid (FOLVITE) tablet 1 mg, Daily  ipratropium 0.5 mg-albuterol 2.5 mg (DUONEB) nebulizer solution 1 Dose, 4x Daily RT  atorvastatin (LIPITOR) tablet 20 mg, Daily  lisinopril (PRINIVIL;ZESTRIL) tablet 10 mg, Daily  morphine (MS CONTIN) extended release tablet 15 mg, BID  oxyCODONE-acetaminophen (PERCOCET) 5-325 MG per tablet 1 tablet, Q8H PRN  levoFLOXacin (LEVAQUIN) 750 MG/150ML infusion 750 mg, Q24H  sodium chloride flush 0.9 % injection 5-40 mL, 2 times per day  sodium chloride flush 0.9 % injection 5-40 mL, PRN  0.9 % sodium chloride infusion, PRN  enoxaparin (LOVENOX) injection 40 mg, Daily  polyethylene glycol (GLYCOLAX) packet 17 g, Daily PRN  acetaminophen (TYLENOL) tablet 650 mg, Q6H PRN   Or  acetaminophen (TYLENOL) suppository 650 mg, Q6H PRN  0.9 % sodium chloride infusion, Continuous  sertraline (ZOLOFT) tablet 75 mg, Daily        Objective:  /73   Pulse 94   Temp 98.3 °F (36.8 °C) (Oral)   Resp 17   Ht 1.778 m (5' 10\")   Wt 65.7 kg (144 lb 12.8 oz)   SpO2 92%   BMI 20.78 kg/m²     Intake/Output Summary (Last 24 hours) at 1/23/2024 1230  Last data filed at 1/23/2024 0940  Gross per 24 hour   Intake 684.87 ml   Output 300 ml   Net 384.87 ml      Wt Readings from Last 3 Encounters:   01/23/24 65.7 kg (144 lb 12.8 oz)   01/15/24 72.1 kg (159 lb)   12/22/23 78 kg (172 lb)       General appearance:  Appears comfortable. AAOx3  HEENT: atraumatic, Pupils equal, muscous membranes moist, no masses appreciated  Cardiovascular: Regular rate and rhythm no murmurs  appreciated  Respiratory: + right sided rhonchi  Gastrointestinal: Abdomen soft, non-tender, BS+  EXT: no edema  Neurology: no gross focal deficts  Psychiatry: Appropriate affect. Not agitated  Skin: Warm, dry, no rashes appreciated    Labs and Tests:  CBC:   Recent Labs     01/22/24  1253 01/23/24  0439   WBC 60.6* 58.2*   HGB 9.8* 9.1*   * 583*     BMP:    Recent Labs     01/22/24  1253 01/23/24  0439   * 130*   K 4.7 4.2   CL 92* 95*   CO2 23 22   BUN 14 16   CREATININE <0.5* 0.6*   GLUCOSE 132* 130*     Hepatic:   Recent Labs     01/22/24  1253   AST 45*   ALT 64*   BILITOT 0.5   ALKPHOS 518*     CT SOFT TISSUE NECK W CONTRAST   Final Result   1. Enlarged abnormal appearing right jugular chain and right supraclavicular   lymph nodes.   2. There is question of a 1.3 cm lesion in the region of the left parotid   gland.  This may represent an enlarged intraparotid/periparotid lymph node   versus a primary parotid lesion.   3. Partial visualization of a large heterogeneously enhancing mass involving   the right orbital apex with extension 3 thoracic cage into the right upper   back and right posterior neck including involvement of the right 2nd through   4th ribs and right aspect of the upper thoracic spine.   4. Question of a metastatic lesion involving the left scapula.         XR CHEST PORTABLE   Final Result   Progressive pulmonary metastatic disease with associated right basilar   segmental atelectasis versus pneumonia.         MRI CERVICAL SPINE W WO CONTRAST    (Results Pending)   CT CHEST W CONTRAST    (Results Pending)       Recent imaging reviewed    Problem List  Principal Problem:    Bacterial pneumonia  Active Problems:    Severe malnutrition (HCC)  Resolved Problems:    * No resolved hospital problems. *          Assessment/Plan:   Sepsis secondary to pna  - iv atbx  Fu cutlures  - ct chest pending  Wbc improving repeat in am    Mestatic non small cell lung cancer  - heme on board mri

## 2024-01-23 NOTE — CONSULTS
[facility]    Consult Note      Requesting Physician:  The hospitalist    CHIEF COMPLAINT:  Pain      HISTORY OF PRESENT ILLNESS:    Mr. Novoa  is a 58 y.o. male we are seeing in consultation for Metastatic lung cancer.  He was initially seen in late December.  He presented with right upper back pain in November.  He was seen at emergency department.  He was found to have right upper lobe lung mass.  PET CT scan on 12/18/2023 showed significant increasing size of the right upper lobe mass invading posterior third rib measuring 4 x 4 cm with maximal SUV of 17.4.  He has mediastinal lymphadenopathy.  CT-guided biopsy of the right upper lobe mass showed poorly differentiated non-small cell carcinoma.  He was placed on MS Contin and Percocet.  He was treated with antibiotics for bronchitis.  He was scheduled for systemic therapy.  However he could not continue his care in my practice because of insurance reason.  In the meantime he is feeling worse with persistent pain and progressive weakness.  Therefore he was admitted yesterday.      ICD-10-CM    1. Chest pain, unspecified type  R07.9       2. Leukocytosis, unspecified type  D72.829       3. Pneumonia due to infectious organism, unspecified laterality, unspecified part of lung  J18.9              Past Medical History:  Past Medical History:   Diagnosis Date    Arthritis     Cancer (HCC)     Hyperlipidemia     Hypertension        Past Surgical History:  Past Surgical History:   Procedure Laterality Date    CT NEEDLE BIOPSY LUNG PERCUTANEOUS  12/21/2023    CT NEEDLE BIOPSY LUNG PERCUTANEOUS 12/21/2023 [facility] CT SCAN       Current Medications:  Current Facility-Administered Medications   Medication Dose Route Frequency Provider Last Rate Last Admin    folic acid (FOLVITE) tablet 1 mg  1 mg Oral Daily Filemon Laws MD        cyanocobalamin  injection 1,000 mcg  1,000 mcg IntraMUSCular Once Filemon Laws MD        atorvastatin (LIPITOR) tablet 20 mg  20 mg Oral Daily Maria Ines Rivera MD   20 mg at 01/22/24 2059    lisinopril (PRINIVIL;ZESTRIL) tablet 10 mg  10 mg Oral Daily Maria Ines Rivera MD        morphine (MS CONTIN) extended release tablet 15 mg  15 mg Oral BID Maria Ines Rivera MD   15 mg at 01/22/24 1930    oxyCODONE-acetaminophen (PERCOCET) 5-325 MG per tablet 1 tablet  1 tablet Oral Q8H PRN Maria Ines Rivera MD   1 tablet at 01/23/24 0122    levoFLOXacin (LEVAQUIN) 750 MG/150ML infusion 750 mg  750 mg IntraVENous Q24H Maria Ines Rivera MD   Stopped at 01/22/24 2159    sodium chloride flush 0.9 % injection 5-40 mL  5-40 mL IntraVENous 2 times per day Marai Ines Rivera MD        sodium chloride flush 0.9 % injection 5-40 mL  5-40 mL IntraVENous PRN Maria Ines Rivera MD        0.9 % sodium chloride infusion   IntraVENous PRN Maria Ines Rivera MD        enoxaparin (LOVENOX) injection 40 mg  40 mg SubCUTAneous Daily Maria Ines Rivera MD   40 mg at 01/22/24 2100    polyethylene glycol (GLYCOLAX) packet 17 g  17 g Oral Daily PRN Maria Ines Rivera MD        acetaminophen (TYLENOL) tablet 650 mg  650 mg Oral Q6H PRN Maria Ines Rivera MD        Or    acetaminophen (TYLENOL) suppository 650 mg  650 mg Rectal Q6H PRN Maria Ines Rivera MD        0.9 % sodium chloride infusion   IntraVENous Continuous Maria Ines Rivera  mL/hr at 01/22/24 1900 New Bag at 01/22/24 1900    sertraline (ZOLOFT) tablet 75 mg  75 mg Oral Daily Rin Condon APRN - CNP   75 mg at 01/22/24 2103       Allergies:  Allergies   Allergen Reactions    Codeine Other (See Comments)     Patient does not recall reaction.    Norco [Hydrocodone-Acetaminophen] Rash       Social History:  Social History     Socioeconomic History    Marital status:      Spouse name: Linda Reno    Number of children: 4    Years of education: Not on file    Highest education

## 2024-01-23 NOTE — CONSULTS
[facility]   Consult Note      Reason for Consult: Metastatic lung cancer  Requesting Physician: Maria Ines Rivera    Subjective:   CHIEF COMPLAINT: Chest pain and shortness of breath     HPI: Patient states that he has been progressively getting worse since his diagnosis of non-small cell lung cancer made in early December.  He has increased shortness of breath, cough with mucoid phlegm and severe chest pain particularly in the right upper part of his chest-progressively worse.  He has previously noted to have right upper lobe lung mass measuring 4 cm, invading posterior third rib with evidence of metastatic disease/pulmonary mets.  CT-guided right upper lobe lung biopsy performed on 12/21 revealed poorly differentiated non-small cell lung cancer.  Patient was planned for systemic chemotherapy but has not been able to start yet due to insurance issues./[facility] NGS currently awaited.    Currently patient states that he has increased fatigue with shortness of breath and a cough with mucoid phlegm.  He has significant pain in the anterior upper chest wall related to rib metastasis.  He has also now noticed an enlarging mass in his right neck suggestive of cervical lymph node metastasis.    Former smoker, quit 1 month ago.  Smoked 1 pack a day for at least 40 years.  No prior diagnosis of COPD.       The patient is a 58 y.o. male with significant past medical history of:      Diagnosis Date    Arthritis     Cancer (HCC)     Hyperlipidemia     Hypertension         Past Surgical History:        Procedure Laterality Date    CT NEEDLE BIOPSY LUNG PERCUTANEOUS  12/21/2023    CT NEEDLE BIOPSY LUNG PERCUTANEOUS 12/21/2023 [facility] CT SCAN     Current Medications:    Current Facility-Administered Medications: folic acid (FOLVITE) tablet 1 mg, 1 mg, Oral, Daily  atorvastatin (LIPITOR) tablet 20 mg, 20 mg, Oral, Daily  lisinopril (PRINIVIL;ZESTRIL) tablet 10 mg, 10 mg, Oral, Daily  morphine (MS CONTIN) extended  release tablet 15 mg, 15 mg, Oral, BID  oxyCODONE-acetaminophen (PERCOCET) 5-325 MG per tablet 1 tablet, 1 tablet, Oral, Q8H PRN  levoFLOXacin (LEVAQUIN) 750 MG/150ML infusion 750 mg, 750 mg, IntraVENous, Q24H  sodium chloride flush 0.9 % injection 5-40 mL, 5-40 mL, IntraVENous, 2 times per day  sodium chloride flush 0.9 % injection 5-40 mL, 5-40 mL, IntraVENous, PRN  0.9 % sodium chloride infusion, , IntraVENous, PRN  enoxaparin (LOVENOX) injection 40 mg, 40 mg, SubCUTAneous, Daily  polyethylene glycol (GLYCOLAX) packet 17 g, 17 g, Oral, Daily PRN  acetaminophen (TYLENOL) tablet 650 mg, 650 mg, Oral, Q6H PRN **OR** acetaminophen (TYLENOL) suppository 650 mg, 650 mg, Rectal, Q6H PRN  0.9 % sodium chloride infusion, , IntraVENous, Continuous  sertraline (ZOLOFT) tablet 75 mg, 75 mg, Oral, Daily    Allergies   Allergen Reactions    Codeine Other (See Comments)     Patient does not recall reaction.    Norco [Hydrocodone-Acetaminophen] Rash       Social History:    TOBACCO:   reports that he has quit smoking. His smoking use included cigarettes. He has a 40.0 pack-year smoking history. He has never used smokeless tobacco.  ETOH:   reports that he does not currently use alcohol after a past usage of about 3.0 - 6.0 standard drinks of alcohol per week.  Patient currently lives independently    Family History:       Problem Relation Age of Onset    Heart Attack Father        REVIEW OF SYSTEMS:    Constitutional: Fatigue and malaise  Ears, nose, mouth, throat: negative for ear drainage, epistaxis, hoarseness, nasal congestion, sore throat and voice change  Respiratory: negative except for cough, dyspnea on exertion, shortness of breath, and sputum  Cardiovascular: Right chest pain from rib metastasis, negative for chest pressure/discomfort, irregular heart beat, lower extremity edema and palpitations  Gastrointestinal: negative for abdominal pain, constipation, diarrhea, jaundice, melena, odynophagia, reflux symptoms and

## 2024-01-23 NOTE — PROGRESS NOTES
[facility] - Inpatient Rehabilitation Department   Phone: [phone]    Occupational Therapy    [] Initial Evaluation            [] Daily Treatment Note         [] Discharge Summary      Patient: Sarabjit Novoa   : 1965   MRN: [id number]   Date of Service:  2024    Admitting Diagnosis:  Bacterial pneumonia  Current Admission Summary: ***  Past Medical History:  has a past medical history of Arthritis, Cancer (HCC), Hyperlipidemia, and Hypertension.  Past Surgical History:  has a past surgical history that includes CT NEEDLE BIOPSY LUNG PERCUTANEOUS (2023).    Discharge Recommendations: ***    DME Required For Discharge: {FFOTD/C EQUIPMENT:76154}    Precautions/Restrictions: {FFRESTRICTIONS:65121}  Weight Bearing Restrictions: {FFWBRESTRICTIONS:87398}  [] Right Upper Extremity  [] Left Upper Extremity [] Right Lower Extremity  [] Left Lower Extremity     Required Braces/Orthotics: {ffbraces:72386}   [] Right  [] Left  Positional Restrictions:{ffpositionrestrictions:48795}    Pre-Admission Information   Lives With: spouse, daughter    Type of Home: house  Home Layout: one level  Home Access:  2 step to enter without rails   Bathroom Layout: tub/shower unit, walk in shower  Bathroom Equipment: grab bars in shower, shower chair  Toilet Height: standard height  Home Equipment: rollator - 4 wheeled walker, single point cane  Transfer Assistance: Independent without use of device  Ambulation Assistance:Independent without use of device  ADL Assistance: requires assistance with bathing, requires assistance with grooming  IADL Assistance: requires assistance with all homemaking tasks  Active :        [] Yes  [] No  Hand Dominance: [] Left  [] Right  Current Employment: retired.  Occupation: construction  Hobbies:   Recent Falls: ***    Examination   Vision:   Vision Gross Assessment: Impaired and Vision Corrective Device: wears glasses at all times  Hearing:   WFL  Perception:    {ffotperception:08973}  Observation:   {ffptobservation:07551}  Posture:   ***  Sensation:   {ffptsensation:47823}  Proprioception:    {ffptproprioception:74605}  Tone:   {ffpttone:71983}  Coordination Testing:   {ffptcoordination:23408}    ROM:   {FFOTROM:71690}  Strength:   {ffotstrength:74462}    Therapist Clinical Decision Making (Complexity): {ffptdecisionmakin}  Clinical Presentation: {ffptclinicalpresentation:56395}      Subjective  General: ***  Pain: 5/10.  Location: center of chest/rib pain \"from my cancer\"  Pain Interventions: RN notified, repositioned , and therapy activities modified        Activities of Daily Living  Basic Activities of Daily Living  {ffotadl:47265}  Instrumental Activities of Daily Living  {ffotiadl:37228}    Functional Mobility  Bed Mobility:  {ffptbedmobility:78523}  Comments:  Transfers:  {ffottransfers:82471}  Comments:  Functional Mobility  {ffotfunctional:70085}  Balance:  {ffptbalancelist:25099}  Comments:    Other Therapeutic Interventions    Functional Outcomes       Cognition  {ffptcognition:85498}  Orientation:    alert and oriented x 4  Command Following:   {ffptcommandfollowin}     Education  Barriers To Learning: {ffptlearning barriers:20226}  Patient Education: patient educated on {ffoteducation:82832}  Learning Assessment:  {ffptlearnin}    Assessment  Activity Tolerance: ***  Impairments Requiring Therapeutic Intervention: {ffptimpairments:51256}  Prognosis: {ffptprognosis:67010}  Clinical Assessment: ***  Safety Interventions: {ffptsafety:73936}    Plan  Frequency: {ffptfrequency:49298}  Current Treatment Recommendations: {ffpttreatmentrecommendations:31705}    Goals  Patient Goals: ***   Short Term Goals:  Time Frame: ***  {ffotgoals:46361}    Above goals reviewed on 2024.  All goals are ongoing at this time unless indicated above.       Therapy Session Time     Individual Group Co-treatment   Time In        Time Out        Minutes

## 2024-01-23 NOTE — PROGRESS NOTES
01/23/24 1341   RT Protocol   History Pulmonary Disease 1   Respiratory pattern 0   Breath sounds 2   Cough 3   Bronchodilator Assessment Score 6

## 2024-01-23 NOTE — PROGRESS NOTES
[facility] - Inpatient Rehabilitation Department   Phone: [phone]    Physical Therapy    [x] Initial Evaluation            [] Daily Treatment Note         [] Discharge Summary      Patient: Sarabjit Novoa   : 1965   MRN: [id number]   Date of Service:  2024  Admitting Diagnosis: Bacterial pneumonia  Current Admission Summary: Mr. Novoa  is a 58 y.o. male we are seeing in consultation for Metastatic lung cancer.  He was initially seen in late December.  He presented with right upper back pain in November.  He was seen at emergency department.  He was found to have right upper lobe lung mass.  PET CT scan on 2023 showed significant increasing size of the right upper lobe mass invading posterior third rib measuring 4 x 4 cm with maximal SUV of 17.4.  He has mediastinal lymphadenopathy.  CT-guided biopsy of the right upper lobe mass showed poorly differentiated non-small cell carcinoma.  He was placed on MS Contin and Percocet.  He was treated with antibiotics for bronchitis.  He was scheduled for systemic therapy.  However he could not continue his care in my practice because of insurance reason.  In the meantime he is feeling worse with persistent pain and progressive weakness.  Therefore he was admitted yesterday  Past Medical History:  has a past medical history of Arthritis, Cancer (HCC), Hyperlipidemia, and Hypertension.  Past Surgical History:  has a past surgical history that includes CT NEEDLE BIOPSY LUNG PERCUTANEOUS (2023).  Discharge Recommendations: Sarabjit Novoa scored a 18 on the AM-PAC short mobility form. Current research shows that an AM-PAC score of 18 or greater is typically associated with a discharge to the patient's home setting. Based on the patient's AM-PAC score and their current functional mobility deficits, it is recommended that the patient have 2-3 sessions per week of Physical Therapy at d/c to increase the patient's independence.  At this time,

## 2024-01-23 NOTE — PROGRESS NOTES
from bed with moderate fatigue.  Impairments Requiring Therapeutic Intervention: decreased functional mobility, decreased ADL status, decreased strength, decreased cognition  Prognosis: fair  Clinical Assessment: Pt would benefit from further OT services addressing lack of strength, endurance, and independence in ADL tasks.  Safety Interventions: patient left in chair, chair alarm in place, call light within reach, patient at risk for falls, nurse notified, and family/caregiver present    Plan  Frequency: 3-5 x/per week  Current Treatment Recommendations: strengthening, functional mobility training, endurance training, and ADL/self-care training    Goals  Patient Goals: not given   Short Term Goals:  Time Frame: discharge  Patient will complete upper body ADL at stand by assistance   Patient will complete lower body ADL at stand by assistance   Patient will complete grooming at stand by assistance   Patient will complete functional transfers at modified independent   Patient will complete functional mobility at modified independent     Above goals reviewed on 1/23/2024.  All goals are ongoing at this time unless indicated above.       Therapy Session Time     Individual Group Co-treatment   Time In    1046   Time Out    1139   Minutes    53        Timed Code Treatment Minutes:   38  Total Treatment Minutes:  53       Electronically Signed By: Fela LOPEZ

## 2024-01-23 NOTE — PROGRESS NOTES
Patient has arrived to unit. Vitals stable. Patient is awake, alert and oriented. Respirations are easy and unlabored. Patient does not appear to be in distress. Patient oriented to room and call light. Plan of care discussed with patient, patient agreeable. Call light within reach.

## 2024-01-23 NOTE — PROGRESS NOTES
Nutrition Note    RECOMMENDATIONS  PO Diet: Regular   ONS: magic cup & Ensure TID  Consider appetite stimulant to promote po intake.     NUTRITION ASSESSMENT   Nutrition intervention triggered for wt loss & poor appetite.  Pt reports 24 lb unintentional wt loss in 2 months, verified per EMR.  Reports has not had an appetite in past week, with only bites of foods taken, & then feeling full.  Pt drinks Ensure at home & is agreeable to receive with meals TID.  Will monitor for improved po & supp intake as pt has met ASPEN criteria for malnutrition.     Nutrition Related Findings: +BS; Na 130; no edema noted.  IVF: NaCl @ 100 ml/hr  Wounds: None  Nutrition Education:  Education not indicated   Nutrition Goals: PO intake 50% or greater     MALNUTRITION ASSESSMENT   Acute Illness  Malnutrition Status: Severe malnutrition  Findings of the 6 clinical characteristics of malnutrition:  Energy Intake:  50% or less of estimated energy requirements for 5 or more days  Weight Loss:  Greater than 7.5% over 3 months (14% loss in 2 months)     Body Fat Loss:  Moderate body fat loss Buccal region   Muscle Mass Loss:  Mild muscle mass loss Temples (temporalis), Clavicles (pectoralis & deltoids)  Fluid Accumulation:  No significant fluid accumulation     Strength:  Not Performed      NUTRITION DIAGNOSIS   Severe malnutrition related to catabolic illness, inadequate protein-energy intake as evidenced by intake 0-25%, poor intake prior to admission, weight loss 7.5% in 3 months, moderate loss of subcutaneous fat, mild muscle loss      CURRENT NUTRITION THERAPIES  ADULT DIET; Regular  ADULT ORAL NUTRITION SUPPLEMENT; Breakfast, Lunch, Dinner; Frozen Oral Supplement  ADULT ORAL NUTRITION SUPPLEMENT; Breakfast, Lunch, Dinner; Standard High Calorie/High Protein Oral Supplement     PO Intake: 0%   PO Supplement Intake:None Ordered    ANTHROPOMETRICS  Current Height: 177.8 cm (5' 10\")  Current Weight - Scale: 65.7 kg (144 lb 12.8 oz)

## 2024-01-24 LAB
ANION GAP SERPL CALCULATED.3IONS-SCNC: 12 MMOL/L (ref 3–16)
BASOPHILS # BLD: 0 K/UL (ref 0–0.2)
BASOPHILS NFR BLD: 0 %
BUN SERPL-MCNC: 14 MG/DL (ref 7–20)
CALCIUM SERPL-MCNC: 9.9 MG/DL (ref 8.3–10.6)
CHLORIDE SERPL-SCNC: 97 MMOL/L (ref 99–110)
CO2 SERPL-SCNC: 22 MMOL/L (ref 21–32)
CREAT SERPL-MCNC: 0.5 MG/DL (ref 0.8–1.3)
DEPRECATED RDW RBC AUTO: 13.8 % (ref 12.4–15.4)
EOSINOPHIL # BLD: 0 K/UL (ref 0–0.6)
EOSINOPHIL NFR BLD: 0 %
GFR SERPLBLD CREATININE-BSD FMLA CKD-EPI: >60 ML/MIN/{1.73_M2}
GLUCOSE SERPL-MCNC: 120 MG/DL (ref 70–99)
HCT VFR BLD AUTO: 28.8 % (ref 40.5–52.5)
HGB BLD-MCNC: 9.1 G/DL (ref 13.5–17.5)
LYMPHOCYTES # BLD: 1.3 K/UL (ref 1–5.1)
LYMPHOCYTES NFR BLD: 2 %
MCH RBC QN AUTO: 27.5 PG (ref 26–34)
MCHC RBC AUTO-ENTMCNC: 31.5 G/DL (ref 31–36)
MCV RBC AUTO: 87.4 FL (ref 80–100)
MONOCYTES # BLD: 2.5 K/UL (ref 0–1.3)
MONOCYTES NFR BLD: 4 %
NEUTROPHILS # BLD: 59 K/UL (ref 1.7–7.7)
NEUTROPHILS NFR BLD: 93 %
NEUTS BAND NFR BLD MANUAL: 1 % (ref 0–7)
PLATELET # BLD AUTO: 574 K/UL (ref 135–450)
PLATELET BLD QL SMEAR: ABNORMAL
PMV BLD AUTO: 7.6 FL (ref 5–10.5)
POTASSIUM SERPL-SCNC: 4.3 MMOL/L (ref 3.5–5.1)
RBC # BLD AUTO: 3.29 M/UL (ref 4.2–5.9)
RBC MORPH BLD: NORMAL
SLIDE REVIEW: ABNORMAL
SODIUM SERPL-SCNC: 131 MMOL/L (ref 136–145)
TOXIC GRANULES BLD QL SMEAR: PRESENT
WBC # BLD AUTO: 62.8 K/UL (ref 4–11)

## 2024-01-24 PROCEDURE — 6360000002 HC RX W HCPCS: Performed by: INTERNAL MEDICINE

## 2024-01-24 PROCEDURE — 85025 COMPLETE CBC W/AUTO DIFF WBC: CPT

## 2024-01-24 PROCEDURE — 2580000003 HC RX 258: Performed by: INTERNAL MEDICINE

## 2024-01-24 PROCEDURE — 36415 COLL VENOUS BLD VENIPUNCTURE: CPT

## 2024-01-24 PROCEDURE — 6370000000 HC RX 637 (ALT 250 FOR IP): Performed by: INTERNAL MEDICINE

## 2024-01-24 PROCEDURE — 80048 BASIC METABOLIC PNL TOTAL CA: CPT

## 2024-01-24 PROCEDURE — 94761 N-INVAS EAR/PLS OXIMETRY MLT: CPT

## 2024-01-24 PROCEDURE — 99233 SBSQ HOSP IP/OBS HIGH 50: CPT | Performed by: INTERNAL MEDICINE

## 2024-01-24 PROCEDURE — 94640 AIRWAY INHALATION TREATMENT: CPT

## 2024-01-24 PROCEDURE — 6370000000 HC RX 637 (ALT 250 FOR IP): Performed by: NURSE PRACTITIONER

## 2024-01-24 PROCEDURE — 1200000000 HC SEMI PRIVATE

## 2024-01-24 RX ORDER — SENNA AND DOCUSATE SODIUM 50; 8.6 MG/1; MG/1
1 TABLET, FILM COATED ORAL DAILY
Status: DISCONTINUED | OUTPATIENT
Start: 2024-01-24 | End: 2024-01-27 | Stop reason: HOSPADM

## 2024-01-24 RX ADMIN — STANDARDIZED SENNA CONCENTRATE AND DOCUSATE SODIUM 1 TABLET: 8.6; 5 TABLET ORAL at 15:51

## 2024-01-24 RX ADMIN — SERTRALINE HYDROCHLORIDE 75 MG: 50 TABLET ORAL at 09:07

## 2024-01-24 RX ADMIN — ATORVASTATIN CALCIUM 20 MG: 20 TABLET, FILM COATED ORAL at 09:07

## 2024-01-24 RX ADMIN — FOLIC ACID 1 MG: 1 TABLET ORAL at 09:07

## 2024-01-24 RX ADMIN — IPRATROPIUM BROMIDE AND ALBUTEROL SULFATE 1 DOSE: .5; 3 SOLUTION RESPIRATORY (INHALATION) at 09:33

## 2024-01-24 RX ADMIN — IPRATROPIUM BROMIDE AND ALBUTEROL SULFATE 1 DOSE: .5; 3 SOLUTION RESPIRATORY (INHALATION) at 19:49

## 2024-01-24 RX ADMIN — MORPHINE SULFATE 15 MG: 15 TABLET, FILM COATED, EXTENDED RELEASE ORAL at 21:53

## 2024-01-24 RX ADMIN — SODIUM CHLORIDE, PRESERVATIVE FREE 10 ML: 5 INJECTION INTRAVENOUS at 09:14

## 2024-01-24 RX ADMIN — LEVOFLOXACIN 750 MG: 5 INJECTION, SOLUTION INTRAVENOUS at 21:59

## 2024-01-24 RX ADMIN — POLYETHYLENE GLYCOL 3350 17 G: 17 POWDER, FOR SOLUTION ORAL at 10:25

## 2024-01-24 RX ADMIN — MORPHINE SULFATE 15 MG: 15 TABLET, FILM COATED, EXTENDED RELEASE ORAL at 09:08

## 2024-01-24 RX ADMIN — SODIUM CHLORIDE, PRESERVATIVE FREE 10 ML: 5 INJECTION INTRAVENOUS at 21:54

## 2024-01-24 RX ADMIN — SODIUM CHLORIDE: 9 INJECTION, SOLUTION INTRAVENOUS at 10:47

## 2024-01-24 RX ADMIN — ENOXAPARIN SODIUM 40 MG: 100 INJECTION SUBCUTANEOUS at 09:09

## 2024-01-24 RX ADMIN — LISINOPRIL 10 MG: 10 TABLET ORAL at 09:07

## 2024-01-24 NOTE — PROGRESS NOTES
[facility] Pulmonary/CCM Progress note      Admit Date: 1/22/2024    Chief Complaint: Chest pain and shortness of breath    Subjective:     Interval History: Continues to have some right-sided chest discomfort and shortness of breath.  States that he feels uncomfortable, has increased tiredness.  On room air.    Scheduled Meds:   [START ON 1/25/2024] CARBOplatin (PARAPLATIN) 860 mg in sodium chloride 0.9 % 250 mL chemo IVPB  860 mg IntraVENous Once    [START ON 1/25/2024] PACLitaxel (TAXOL) 318 mg in sodium chloride 0.9 % 250 mL chemo infusion  175 mg/m2 IntraVENous Once    [START ON 1/25/2024] pembrolizumab (KEYTRUDA) 200 mg in sodium chloride 0.9 % 100 mL IVPB  200 mg IntraVENous Once    ipratropium 0.5 mg-albuterol 2.5 mg  1 Dose Inhalation BID RT    lisinopril  10 mg Oral Daily    morphine  15 mg Oral BID    levofloxacin  750 mg IntraVENous Q24H    sodium chloride flush  5-40 mL IntraVENous 2 times per day    enoxaparin  40 mg SubCUTAneous Daily    sertraline  75 mg Oral Daily     Continuous Infusions:   sodium chloride      sodium chloride 100 mL/hr at 01/24/24 1047     PRN Meds:ipratropium 0.5 mg-albuterol 2.5 mg, oxyCODONE-acetaminophen, sodium chloride flush, sodium chloride, polyethylene glycol, acetaminophen **OR** acetaminophen    Review of Systems  Constitutional: Fatigue, malaise and weight loss  Ears, nose, mouth, throat: negative for ear drainage, epistaxis, hoarseness, nasal congestion, sore throat and voice change  Respiratory: negative except for cough, shortness of breath, and sputum  Cardiovascular: negative for chest pain, chest pressure/discomfort, irregular heart beat, lower extremity edema and palpitations  Gastrointestinal: negative for abdominal pain, constipation, diarrhea, jaundice, melena, odynophagia, reflux symptoms and vomiting  Hematologic/lymphatic: negative for bleeding, easy bruising, lymphadenopathy and petechiae  Musculoskeletal:negative for arthralgias, bone pain, muscle weakness,  steroids.    No role of bronchoscopy at this time.      Sree Clark MD

## 2024-01-24 NOTE — PROGRESS NOTES
Physical Therapy  Sarabjit Novoa  PT reviewed patient's chart. Attempted this date, declining due to fatigue from lack of sleep. Will re-attempt as the schedule allows.  Thank you.  Zahra Hernandez PT, DPT, [id number]

## 2024-01-24 NOTE — PROGRESS NOTES
ONCOLOGY HEMATOLOGY CARE PROGRESS NOTE      SUBJECTIVE:  Feeling reasonably well.  Denies chest pain or shortness of breath.  Not much cough.      ROS:     Constitutional:  Weight loss, No fever, No chills, No night sweats.  Energy level decreased.   Eyes:  No impairment or change in vision  ENT / Mouth:  No pain, abnormal ulceration, bleeding, nasal drip or change in voice or hearing  Cardiovascular:  No chest pain, palpitations, new edema, or calf discomfort  Respiratory:  No pain, hemoptysis, change to breathing  Breast:  No pain, discharge, change in appearance or texture  Gastrointestinal:  No pain, cramping, jaundice, change to eating and bowel habits  Urinary:  No pain, bleeding or change in continence  Genitalia: No pain, bleeding or discharge  Musculoskeletal:  No redness, pain, edema or weakness  Skin:  No pruritus, rash, change to nodules or lesions  Neurologic:  No discomfort, change in mental status, speech, sensory or motor activity  Psychiatric:  No change in concentration or change to affect or mood  Endocrine:  No hot flashes, increased thirst, or change to urine production  Hematologic: No petechiae, ecchymosis or bleeding  Lymphatic:  No lymphadenopathy or lymphedema  Allergy / Immunologic:  No eczema, hives, frequent or recurrent infections    OBJECTIVE        Physical    VITALS:  Patient Vitals for the past 24 hrs:   BP Temp Temp src Pulse Resp SpO2 Weight   01/24/24 1200 108/61 97.9 °F (36.6 °C) Oral (!) 106 18 91 % --   01/24/24 0938 -- -- -- -- 18 -- --   01/24/24 0935 -- -- -- (!) 113 18 92 % --   01/24/24 0703 98/68 98.5 °F (36.9 °C) Oral 91 18 91 % --   01/24/24 0638 -- -- -- -- -- -- 65.3 kg (144 lb)   01/24/24 0425 107/65 98.5 °F (36.9 °C) Oral 96 16 91 % --   01/23/24 2346 108/67 98.6 °F (37 °C) Oral 94 16 90 % --   01/23/24 2128 -- -- -- -- 18 -- --   01/23/24 2056 106/63 98.2 °F (36.8 °C) Oral 96 22 92 % --   01/23/24 1545 108/62 98 °F (36.7 °C)  abnormal appearing right jugular chain and right supraclavicular  lymph nodes.  2. There is question of a 1.3 cm lesion in the region of the left parotid  gland.  This may represent an enlarged intraparotid/periparotid lymph node  versus a primary parotid lesion.  3. Partial visualization of a large heterogeneously enhancing mass involving  the right orbital apex with extension 3 thoracic cage into the right upper  back and right posterior neck including involvement of the right 2nd through  4th ribs and right aspect of the upper thoracic spine.  4. Question of a metastatic lesion involving the left scapula.      Problem List  Patient Active Problem List   Diagnosis    Erectile dysfunction    Hyperlipidemia    Impaired glucose tolerance    Psychosexual dysfunction with inhibited sexual excitement    GENEVIEVE (generalized anxiety disorder)    Nicotine dependence with current use    Primary hypertension    Spondylosis of lumbar region without myelopathy or radiculopathy    Arthritis of first metatarsophalangeal (MTP) joint of right foot    Vaccine counseling    Malignant neoplasm of right lung (HCC)    Metastatic cancer to bone (HCC)    Bacterial pneumonia    Severe malnutrition (HCC)       ASSESSMENT AND PLAN:  Metastatic poorly differentiated non-small cell carcinoma of the lung to the bone and lymph nodes right upper lobe mass with multiple pulmonary nodules as well as direct rib invasion.  Caris NGS is pending.  Pneumonia and bronchitis  Leukocytosis and thrombocytosis.  Leukemoid reaction.   Anemia  Neoplastic pain  Weight loss  Abnormal liver function tests.  Hold Lipitor.  Avoid liver offending drug if possible.   45-pack-year smoking history and he quit in December 2023.    Overall stable.  Reviewed imaging studies.  Rapidly progressive metastatic lung cancer.  Pain under control with narcotics.  Prognosis is poor.  Discussed with the patient and he likes to proceed with chemotherapy.  Plan carboplatin, taxol and

## 2024-01-24 NOTE — PROGRESS NOTES
[facility]    1/24/2024 12:12 PM        Name: Sarabjit Novoa .              Admitted: 1/22/2024  Primary Care Provider: Markos Quinn MD (Tel: [phone])        Subjective:    Some confusion this morning is improving pain is under controlled    Reviewed interval ancillary notes    Current Medications  folic acid (FOLVITE) tablet 1 mg, Daily  ipratropium 0.5 mg-albuterol 2.5 mg (DUONEB) nebulizer solution 1 Dose, Q4H PRN  ipratropium 0.5 mg-albuterol 2.5 mg (DUONEB) nebulizer solution 1 Dose, BID RT  atorvastatin (LIPITOR) tablet 20 mg, Daily  lisinopril (PRINIVIL;ZESTRIL) tablet 10 mg, Daily  morphine (MS CONTIN) extended release tablet 15 mg, BID  oxyCODONE-acetaminophen (PERCOCET) 5-325 MG per tablet 1 tablet, Q8H PRN  levoFLOXacin (LEVAQUIN) 750 MG/150ML infusion 750 mg, Q24H  sodium chloride flush 0.9 % injection 5-40 mL, 2 times per day  sodium chloride flush 0.9 % injection 5-40 mL, PRN  0.9 % sodium chloride infusion, PRN  enoxaparin (LOVENOX) injection 40 mg, Daily  polyethylene glycol (GLYCOLAX) packet 17 g, Daily PRN  acetaminophen (TYLENOL) tablet 650 mg, Q6H PRN   Or  acetaminophen (TYLENOL) suppository 650 mg, Q6H PRN  0.9 % sodium chloride infusion, Continuous  sertraline (ZOLOFT) tablet 75 mg, Daily        Objective:  /61   Pulse (!) 106   Temp 97.9 °F (36.6 °C) (Oral)   Resp 18   Ht 1.778 m (5' 10\")   Wt 65.3 kg (144 lb)   SpO2 91%   BMI 20.66 kg/m²     Intake/Output Summary (Last 24 hours) at 1/24/2024 1212  Last data filed at 1/23/2024 1607  Gross per 24 hour   Intake 110 ml   Output --   Net 110 ml        Wt Readings from Last 3 Encounters:   01/24/24 65.3 kg (144 lb)   01/15/24 72.1 kg (159 lb)   12/22/23 78 kg (172 lb)       General appearance:  Appears comfortable. AAOx3  HEENT: atraumatic, Pupils equal, muscous membranes moist, no masses appreciated  Cardiovascular: Regular

## 2024-01-24 NOTE — PLAN OF CARE
Problem: Safety - Adult  Goal: Free from fall injury  1/24/2024 0925 by Melvin Verdugo, RN  Outcome: Progressing  Note: Pt remains free from falls. Safety precautions in place. Bed in lowest position, bed wheels locked, call light with in reach, bed alarm on, yellow blanket in place, fall risk wrist band on, SAFE outside of doorway. Will continue to monitor.  1/24/2024 0444 by John Paul Tomlinson, RN  Outcome: Progressing

## 2024-01-25 LAB
ANION GAP SERPL CALCULATED.3IONS-SCNC: 11 MMOL/L (ref 3–16)
ANISOCYTOSIS BLD QL SMEAR: ABNORMAL
BASOPHILS # BLD: 0 K/UL (ref 0–0.2)
BASOPHILS NFR BLD: 0 %
BUN SERPL-MCNC: 16 MG/DL (ref 7–20)
BURR CELLS BLD QL SMEAR: ABNORMAL
CALCIUM SERPL-MCNC: 9.5 MG/DL (ref 8.3–10.6)
CHLORIDE SERPL-SCNC: 99 MMOL/L (ref 99–110)
CO2 SERPL-SCNC: 22 MMOL/L (ref 21–32)
CREAT SERPL-MCNC: 0.5 MG/DL (ref 0.8–1.3)
DEPRECATED RDW RBC AUTO: 14 % (ref 12.4–15.4)
EOSINOPHIL # BLD: 0 K/UL (ref 0–0.6)
EOSINOPHIL NFR BLD: 0 %
GFR SERPLBLD CREATININE-BSD FMLA CKD-EPI: >60 ML/MIN/{1.73_M2}
GLUCOSE SERPL-MCNC: 132 MG/DL (ref 70–99)
HCT VFR BLD AUTO: 27.1 % (ref 40.5–52.5)
HGB BLD-MCNC: 8.8 G/DL (ref 13.5–17.5)
LYMPHOCYTES # BLD: 0.6 K/UL (ref 1–5.1)
LYMPHOCYTES NFR BLD: 1 %
MCH RBC QN AUTO: 28.1 PG (ref 26–34)
MCHC RBC AUTO-ENTMCNC: 32.6 G/DL (ref 31–36)
MCV RBC AUTO: 86.1 FL (ref 80–100)
MONOCYTES # BLD: 0.6 K/UL (ref 0–1.3)
MONOCYTES NFR BLD: 1 %
MYELOCYTES NFR BLD MANUAL: 2 %
NEUTROPHILS # BLD: 55.9 K/UL (ref 1.7–7.7)
NEUTROPHILS NFR BLD: 96 %
NEUTS VAC BLD QL SMEAR: PRESENT
PATH INTERP BLD-IMP: NO
PLATELET # BLD AUTO: 586 K/UL (ref 135–450)
PLATELET BLD QL SMEAR: ABNORMAL
PMV BLD AUTO: 7.3 FL (ref 5–10.5)
POTASSIUM SERPL-SCNC: 4 MMOL/L (ref 3.5–5.1)
RBC # BLD AUTO: 3.14 M/UL (ref 4.2–5.9)
SLIDE REVIEW: ABNORMAL
SMUDGE CELLS BLD QL SMEAR: PRESENT
SODIUM SERPL-SCNC: 132 MMOL/L (ref 136–145)
TOXIC GRANULES BLD QL SMEAR: PRESENT
WBC # BLD AUTO: 57 K/UL (ref 4–11)

## 2024-01-25 PROCEDURE — 1200000000 HC SEMI PRIVATE

## 2024-01-25 PROCEDURE — 99232 SBSQ HOSP IP/OBS MODERATE 35: CPT | Performed by: INTERNAL MEDICINE

## 2024-01-25 PROCEDURE — 6370000000 HC RX 637 (ALT 250 FOR IP): Performed by: NURSE PRACTITIONER

## 2024-01-25 PROCEDURE — 94761 N-INVAS EAR/PLS OXIMETRY MLT: CPT

## 2024-01-25 PROCEDURE — 6360000002 HC RX W HCPCS: Performed by: INTERNAL MEDICINE

## 2024-01-25 PROCEDURE — 6370000000 HC RX 637 (ALT 250 FOR IP): Performed by: INTERNAL MEDICINE

## 2024-01-25 PROCEDURE — 80048 BASIC METABOLIC PNL TOTAL CA: CPT

## 2024-01-25 PROCEDURE — 2580000003 HC RX 258: Performed by: INTERNAL MEDICINE

## 2024-01-25 PROCEDURE — 96417 CHEMO IV INFUS EACH ADDL SEQ: CPT

## 2024-01-25 PROCEDURE — 94640 AIRWAY INHALATION TREATMENT: CPT

## 2024-01-25 PROCEDURE — 36415 COLL VENOUS BLD VENIPUNCTURE: CPT

## 2024-01-25 PROCEDURE — 85025 COMPLETE CBC W/AUTO DIFF WBC: CPT

## 2024-01-25 PROCEDURE — 96415 CHEMO IV INFUSION ADDL HR: CPT

## 2024-01-25 RX ORDER — MIRTAZAPINE 15 MG/1
7.5 TABLET, FILM COATED ORAL NIGHTLY
Status: DISCONTINUED | OUTPATIENT
Start: 2024-01-25 | End: 2024-01-27 | Stop reason: HOSPADM

## 2024-01-25 RX ORDER — OXYCODONE HYDROCHLORIDE AND ACETAMINOPHEN 5; 325 MG/1; MG/1
1 TABLET ORAL EVERY 4 HOURS PRN
Status: DISCONTINUED | OUTPATIENT
Start: 2024-01-25 | End: 2024-01-27 | Stop reason: HOSPADM

## 2024-01-25 RX ORDER — MORPHINE SULFATE 15 MG/1
15 TABLET, FILM COATED, EXTENDED RELEASE ORAL 2 TIMES DAILY PRN
Status: DISCONTINUED | OUTPATIENT
Start: 2024-01-25 | End: 2024-01-27 | Stop reason: HOSPADM

## 2024-01-25 RX ORDER — ALBUTEROL SULFATE 2.5 MG/3ML
2.5 SOLUTION RESPIRATORY (INHALATION) EVERY 4 HOURS PRN
Status: DISCONTINUED | OUTPATIENT
Start: 2024-01-25 | End: 2024-01-27 | Stop reason: HOSPADM

## 2024-01-25 RX ADMIN — SODIUM CHLORIDE 200 MG: 9 INJECTION, SOLUTION INTRAVENOUS at 13:04

## 2024-01-25 RX ADMIN — STANDARDIZED SENNA CONCENTRATE AND DOCUSATE SODIUM 1 TABLET: 8.6; 5 TABLET ORAL at 10:13

## 2024-01-25 RX ADMIN — SODIUM CHLORIDE, PRESERVATIVE FREE 10 ML: 5 INJECTION INTRAVENOUS at 20:26

## 2024-01-25 RX ADMIN — SERTRALINE HYDROCHLORIDE 75 MG: 50 TABLET ORAL at 10:13

## 2024-01-25 RX ADMIN — OXYCODONE AND ACETAMINOPHEN 1 TABLET: 5; 325 TABLET ORAL at 19:17

## 2024-01-25 RX ADMIN — SODIUM CHLORIDE: 9 INJECTION, SOLUTION INTRAVENOUS at 20:19

## 2024-01-25 RX ADMIN — DEXAMETHASONE SODIUM PHOSPHATE 20 MG: 4 INJECTION, SOLUTION INTRAMUSCULAR; INTRAVENOUS at 13:45

## 2024-01-25 RX ADMIN — LEVOFLOXACIN 750 MG: 5 INJECTION, SOLUTION INTRAVENOUS at 20:21

## 2024-01-25 RX ADMIN — FOSAPREPITANT 150 MG: 150 INJECTION, POWDER, LYOPHILIZED, FOR SOLUTION INTRAVENOUS at 12:08

## 2024-01-25 RX ADMIN — IPRATROPIUM BROMIDE AND ALBUTEROL SULFATE 1 DOSE: .5; 3 SOLUTION RESPIRATORY (INHALATION) at 08:38

## 2024-01-25 RX ADMIN — SODIUM CHLORIDE, PRESERVATIVE FREE 10 ML: 5 INJECTION INTRAVENOUS at 16:32

## 2024-01-25 RX ADMIN — ENOXAPARIN SODIUM 40 MG: 100 INJECTION SUBCUTANEOUS at 10:13

## 2024-01-25 RX ADMIN — PALONOSETRON 0.25 MG: 0.05 INJECTION, SOLUTION INTRAVENOUS at 12:07

## 2024-01-25 RX ADMIN — CARBOPLATIN 860 MG: 10 INJECTION, SOLUTION INTRAVENOUS at 17:54

## 2024-01-25 RX ADMIN — MIRTAZAPINE 7.5 MG: 15 TABLET, FILM COATED ORAL at 20:14

## 2024-01-25 RX ADMIN — ACETAMINOPHEN 650 MG: 325 TABLET ORAL at 04:52

## 2024-01-25 RX ADMIN — LISINOPRIL 10 MG: 10 TABLET ORAL at 10:13

## 2024-01-25 RX ADMIN — PACLITAXEL 318 MG: 6 INJECTION, SOLUTION INTRAVENOUS at 14:19

## 2024-01-25 RX ADMIN — IPRATROPIUM BROMIDE AND ALBUTEROL SULFATE 1 DOSE: .5; 3 SOLUTION RESPIRATORY (INHALATION) at 22:29

## 2024-01-25 RX ADMIN — OXYCODONE AND ACETAMINOPHEN 1 TABLET: 5; 325 TABLET ORAL at 10:13

## 2024-01-25 RX ADMIN — OXYCODONE AND ACETAMINOPHEN 1 TABLET: 5; 325 TABLET ORAL at 15:12

## 2024-01-25 ASSESSMENT — PAIN SCALES - GENERAL
PAINLEVEL_OUTOF10: 5
PAINLEVEL_OUTOF10: 4
PAINLEVEL_OUTOF10: 2
PAINLEVEL_OUTOF10: 0
PAINLEVEL_OUTOF10: 0
PAINLEVEL_OUTOF10: 2
PAINLEVEL_OUTOF10: 0
PAINLEVEL_OUTOF10: 5
PAINLEVEL_OUTOF10: 4
PAINLEVEL_OUTOF10: 0
PAINLEVEL_OUTOF10: 0

## 2024-01-25 ASSESSMENT — PAIN DESCRIPTION - ORIENTATION
ORIENTATION: MID;UPPER
ORIENTATION: MID;UPPER;OTHER (COMMENT)
ORIENTATION: MID;UPPER
ORIENTATION: MID;UPPER

## 2024-01-25 ASSESSMENT — PAIN DESCRIPTION - LOCATION
LOCATION: BACK

## 2024-01-25 ASSESSMENT — PAIN DESCRIPTION - DESCRIPTORS
DESCRIPTORS: STABBING

## 2024-01-25 ASSESSMENT — PAIN DESCRIPTION - PAIN TYPE
TYPE: ACUTE PAIN

## 2024-01-25 NOTE — DISCHARGE INSTR - COC
Continuity of Care Form    Patient Name: Sarabjit Novoa   :  1965  MRN:  [id number]    Admit date:  2024  Discharge date:  2024    Code Status Order: DNR-CCA   Advance Directives:     Admitting Physician:  Maria Ines Rivera MD  PCP: Markos Quinn MD    Discharging Nurse: MISBAH Varma  Discharging Hospital Unit/Room#: 5TN-5584/5584-01  Discharging Unit Phone Number: [phone]    Emergency Contact:   Extended Emergency Contact Information  Primary Emergency Contact: Linda Larry  Home Phone: [phone]  Relation: Spouse  Secondary Emergency Contact: Breezy Novoa  Mobile Phone: [phone]  Relation: Child   needed? No    Past Surgical History:  Past Surgical History:   Procedure Laterality Date    CT NEEDLE BIOPSY LUNG PERCUTANEOUS  2023    CT NEEDLE BIOPSY LUNG PERCUTANEOUS 2023 [facility] CT SCAN       Immunization History:   Immunization History   Administered Date(s) Administered    Influenza, FLUCELVAX, (age 6 mo+), MDCK, PF, 0.5mL 2022, 2023    Pneumococcal, PCV20, PREVNAR 20, (age 6w+), IM, 0.5mL 01/15/2024       Active Problems:  Patient Active Problem List   Diagnosis Code    Erectile dysfunction N52.9    Hyperlipidemia E78.5    Impaired glucose tolerance R73.02    Psychosexual dysfunction with inhibited sexual excitement F52.8    GENEVIEVE (generalized anxiety disorder) F41.1    Nicotine dependence with current use F17.200    Primary hypertension I10    Spondylosis of lumbar region without myelopathy or radiculopathy M47.816    Arthritis of first metatarsophalangeal (MTP) joint of right foot M19.071    Vaccine counseling Z71.85    Malignant neoplasm of right lung (HCC) C34.91    Metastatic cancer to bone (HCC) C79.51    Bacterial pneumonia J15.9    Severe malnutrition (HCC) E43       Isolation/Infection:   Isolation            Chemo          Patient Infection Status       None to display            Nurse Assessment:  Last Vital Signs: /71   Pulse 92   EST    CASE MANAGEMENT/SOCIAL WORK SECTION    Inpatient Status Date: 1/22/2004    Readmission Risk Assessment Score:  Readmission Risk              Risk of Unplanned Readmission:  15           Discharging to Facility/ Agency     [facility]  Phone: [phone]  Fax: [phone]     Name:  [facility]  Phone:  [phone]  Fax:      [phone]     Dialysis Facility (if applicable)   Name:  Address:  Dialysis Schedule:  Phone:  Fax:    / signature: Electronically signed by Nelly Bradford on 1/27/24 at 2:55 PM EST    PHYSICIAN SECTION    Prognosis: Fair    Condition at Discharge: Stable    Rehab Potential (if transferring to Rehab): Good    Recommended Labs or Other Treatments After Discharge:     Physician Certification: I certify the above information and transfer of Sarabjit Novoa  is necessary for the continuing treatment of the diagnosis listed and that he requires Home Care for greater 30 days.     Update Admission H&P: No change in H&P    PHYSICIAN SIGNATURE:  Electronically signed by Maria Ines Rivera MD on 1/27/24 at 11:59 AM EST

## 2024-01-25 NOTE — CARE COORDINATION
Chart reviewed at this time for discharge planning.     Per notes pt starting chemo today. Last therapy evaluations recommended home care.     CM will follow up with patient.     Ada Craven RN, BSN  [phone]

## 2024-01-25 NOTE — CARE COORDINATION
LIU spoke to Maggie with [facility] [phone] and she states they can accept pt's [facility] insurance.    CM met with pt and discussed the above and benefits of home care after hospitalization and he did agree. CM explained this home care accepts his [facility] insurance and can provide services.    CM was going to complete DCPA questions but pt states maybe tomorrow he has a lot today.     CM will attempt at later time.      Ada Craven RN, BSN  [phone]

## 2024-01-25 NOTE — PROGRESS NOTES
Patient has arrived to unit in stable condition. Vitals obtained. Patient is awake, alert and oriented. Respirations are easy and unlabored. Patient does not appear to be in distress. Patient oriented to room and call light. Plan of care discussed with patient, patient agreeable. Call light within reach. Fall precautions in place.

## 2024-01-25 NOTE — PROGRESS NOTES
[facility]    1/25/2024 12:01 PM        Name: Sarabjit Novoa .              Admitted: 1/22/2024  Primary Care Provider: Markos Quinn MD (Tel: [phone])        Subjective:    Patient lying in bed pain is under control does have confusion with morphine we will titrate down pain meds and monitor  Reviewed interval ancillary notes    Current Medications  morphine (MS CONTIN) extended release tablet 15 mg, BID PRN  CARBOplatin (PARAPLATIN) 860 mg in sodium chloride 0.9 % 351 mL chemo IVPB, Once  pembrolizumab (KEYTRUDA) 200 mg in sodium chloride 0.9 % 118 mL IVPB, Once  palonosetron (ALOXI) 0.25 mg in sodium chloride 0.9 % 50 mL IVPB, Once  fosaprepitant (EMEND) 150 mg in sodium chloride 0.9 % 150 mL IVPB, Once  dexAMETHasone (DECADRON) 20 mg in NS 50 mL IVPB, Once  PACLitaxel (TAXOL) 318 mg in sodium chloride 0.9 % 328 mL chemo infusion, Once  sennosides-docusate sodium (SENOKOT-S) 8.6-50 MG tablet 1 tablet, Daily  ipratropium 0.5 mg-albuterol 2.5 mg (DUONEB) nebulizer solution 1 Dose, Q4H PRN  ipratropium 0.5 mg-albuterol 2.5 mg (DUONEB) nebulizer solution 1 Dose, BID RT  lisinopril (PRINIVIL;ZESTRIL) tablet 10 mg, Daily  oxyCODONE-acetaminophen (PERCOCET) 5-325 MG per tablet 1 tablet, Q8H PRN  levoFLOXacin (LEVAQUIN) 750 MG/150ML infusion 750 mg, Q24H  sodium chloride flush 0.9 % injection 5-40 mL, 2 times per day  sodium chloride flush 0.9 % injection 5-40 mL, PRN  0.9 % sodium chloride infusion, PRN  enoxaparin (LOVENOX) injection 40 mg, Daily  polyethylene glycol (GLYCOLAX) packet 17 g, Daily PRN  acetaminophen (TYLENOL) tablet 650 mg, Q6H PRN   Or  acetaminophen (TYLENOL) suppository 650 mg, Q6H PRN  0.9 % sodium chloride infusion, Continuous  sertraline (ZOLOFT) tablet 75 mg, Daily        Objective:  /69   Pulse 97   Temp 98 °F (36.7 °C) (Oral)   Resp 18   Ht 1.778 m (5' 10\")   Wt 69.6 kg (153 lb  6.4 oz)   SpO2 90%   BMI 22.01 kg/m²     Intake/Output Summary (Last 24 hours) at 1/25/2024 1201  Last data filed at 1/25/2024 0548  Gross per 24 hour   Intake --   Output 600 ml   Net -600 ml        Wt Readings from Last 3 Encounters:   01/25/24 69.6 kg (153 lb 6.4 oz)   01/15/24 72.1 kg (159 lb)   12/22/23 78 kg (172 lb)       General appearance:  Appears comfortable. AAOx3  HEENT: atraumatic, Pupils equal, muscous membranes moist, no masses appreciated  Cardiovascular: Regular rate and rhythm no murmurs appreciated  Respiratory: + right sided rhonchi  Gastrointestinal: Abdomen soft, non-tender, BS+  EXT: no edema  Neurology: no gross focal deficts  Psychiatry: Appropriate affect. Not agitated  Skin: Warm, dry, no rashes appreciated    Labs and Tests:  CBC:   Recent Labs     01/23/24  0439 01/24/24  0503 01/25/24  0541   WBC 58.2* 62.8* 57.0*   HGB 9.1* 9.1* 8.8*   * 574* 586*       BMP:    Recent Labs     01/23/24  0439 01/24/24  0503 01/25/24  0541   * 131* 132*   K 4.2 4.3 4.0   CL 95* 97* 99   CO2 22 22 22   BUN 16 14 16   CREATININE 0.6* 0.5* 0.5*   GLUCOSE 130* 120* 132*       Hepatic:   Recent Labs     01/22/24  1253   AST 45*   ALT 64*   BILITOT 0.5   ALKPHOS 518*       MRI NECK SOFT TISSUE W WO CONTRAST   Final Result   1. Large right lung apex soft tissue mass with associated destructive changes   of the right posterior 2nd through 4th ribs with associated neural foraminal   invasion and involvement of the right-sided transverse processes and lamina.   There is also partially evaluated epidural soft tissue invasion along the   thoracic canal at T1 through T3. This mass is contiguous with the right   paraspinal cervical soft tissue mass.   2. Enhancing soft tissue nodule measuring 10 x 15 mm along the superior   aspect of the left superficial parotid gland, partially exophytic extending   into the left pre-auricular soft tissues, likely reflecting metastatic lymph   node.   3. Right-sided

## 2024-01-25 NOTE — PROGRESS NOTES
Occupational Therapy  Sarabjit Novoa     Attempted to see patient for OT treatment. Patient currently receiving chemotherapy.     Will plan to see patient tomorrow as schedule allows.    Thanks,   Yue Dominique, [facility] OTR/L [id number]

## 2024-01-25 NOTE — PROGRESS NOTES
[facility] Pulmonary/[facility] Progress note      Admit Date: 1/22/2024    Chief Complaint: Chest pain and shortness of breath    Subjective:     Interval History: Patient has chest discomfort and appears to be in acute delirium.  He is however awake and able to answer questions and follow instructions.  Patient's wife is at bedside today.  Plans to start first treatment with chemotherapy today.    Scheduled Meds:   CARBOplatin (PARAPLATIN) 860 mg in sodium chloride 0.9 % 351 mL chemo IVPB  860 mg IntraVENous Once    pembrolizumab (KEYTRUDA) 200 mg in sodium chloride 0.9 % 118 mL IVPB  200 mg IntraVENous Once    palonosetron (ALOXI) 0.25 mg in sodium chloride 0.9 % 50 mL IVPB  0.25 mg IntraVENous Once    fosaprepitant (EMEND) 150 mg in sodium chloride 0.9 % 150 mL IVPB  150 mg IntraVENous Once    dexAMETHasone  20 mg IntraVENous Once    PACLitaxel (TAXOL) 318 mg in sodium chloride 0.9 % 328 mL chemo infusion  175 mg/m2 IntraVENous Once    sennosides-docusate sodium  1 tablet Oral Daily    ipratropium 0.5 mg-albuterol 2.5 mg  1 Dose Inhalation BID RT    lisinopril  10 mg Oral Daily    morphine  15 mg Oral BID    levofloxacin  750 mg IntraVENous Q24H    sodium chloride flush  5-40 mL IntraVENous 2 times per day    enoxaparin  40 mg SubCUTAneous Daily    sertraline  75 mg Oral Daily     Continuous Infusions:   sodium chloride      sodium chloride 100 mL/hr at 01/24/24 1047     PRN Meds:ipratropium 0.5 mg-albuterol 2.5 mg, oxyCODONE-acetaminophen, sodium chloride flush, sodium chloride, polyethylene glycol, acetaminophen **OR** acetaminophen    Review of Systems  Constitutional: Fatigue, malaise and weight loss  Ears, nose, mouth, throat: negative for ear drainage, epistaxis, hoarseness, nasal congestion, sore throat and voice change  Respiratory: negative except for cough, shortness of breath, and sputum  Cardiovascular: negative for chest pain, chest pressure/discomfort, irregular heart beat, lower extremity edema and  measures 7.8 cm.  Index mass in the right lower lobe  measures 7.1 cm.  Small right-sided pleural effusion is seen.     Upper Abdomen: There is nodularity in the left adrenal gland measuring up 1.2  cm.  There is nodularity in the right adrenal gland measuring up to 2.5 cm.  Ill-defined hypodense nodule seen in the region of the caudate lobe of the  liver measuring 2.2 cm.     Soft Tissues/Bones: Spurring is seen in the spine.  Spurring is seen in the  shoulder joints.     There is bony destruction of the posterior 2nd and 3rd ribs on the right.     There is also bony involvement of the 2nd, 3rd vertebral bodies on the right  soft tissue mass is seen in the chest wall on the right, with the associated  bony involvement, measuring approximately 7.0 cm medial-lateral on image  number 16.     IMPRESSION:  Multiple pulmonary nodules and masses are seen bilaterally, right greater  than left.  The dominant finding is a large mass in the right upper lobe,  with associated soft tissue extension, and bony destruction-bony  involvement..  There is significant progression since November 2023     Pulmonary nodules are also seen on the left, likely intrapulmonary metastasis     Adrenal metastasis, right greater than left, with suspected concomitant  hepatic metastasis    Problem List:     Stage IV poorly differentiated non-small cell lung cancer  Metastatic disease with pulmonary and rib mets  Community-acquired pneumonia  Severe neutrophilic leukocytosis    Assessment/Plan:     Progressive stage IV poorly differentiated non-small cell lung cancer now with lung, cervical lymph node, liver and adrenal metastasis as noted on CT scan.  Delay treatment due to insurance related issues.  Plans to commence systemic chemotherapy soon.  Overall prognosis is guarded.  Should consider XRT for right upper rib cage invasion of the lung mass.    Leukocytosis, 58.  It is unusual that this would be related to an infectious process, possibly

## 2024-01-25 NOTE — PROGRESS NOTES
Original chemotherapy orders reviewed and acknowledged. Appropriateness of chemotherapy treatment regimen Keytruda, Taxol , Carboplatin for diagnosis of Non small cell lung cancer was verified.  Patient educated on chemotherapy regimen.  Acknowledgement of informed consent for chemotherapy verified.  Pt height, weight, and BSA verified.  Appropriate dosing calculations of chemotherapy based on height, weight, and BSA verified.       Administration: Chemotherapy drug Taxol independently verified with Maddy Gonzalez RN prior to administration.  Original order, appropriateness of regimen, drug supplied, height, weight, BSA, dose calculations, expiration dates/times, drug appearance, and two patient identifiers were verified by both RNs.  Drug checked for vesicant/irritant status and for risk of hypersensitivity.  Most recent laboratory values and allergies, were reviewed.  Appropriate IV access available: Peripheral IV placed within the last 24 hours - see IV flowsheet.  + brisk blood return from site  Sonia Schuler RN and Maddy Gonzalez RN verified correct rate of chemotherapy and maintenance IV fluids.  Patient was educated on chemotherapy regimen prior to administration including indication for treatment related to disease & side effects of chemotherapy drug.  Patient verbalizes understanding of all instructions.

## 2024-01-25 NOTE — PROGRESS NOTES
Original chemotherapy orders reviewed and acknowledged. Appropriateness of chemotherapy treatment regimen Keytruda, Taxol , Carboplatin for diagnosis of Non small cell lung cancer was verified.  Patient educated on chemotherapy regimen.  Acknowledgement of informed consent for chemotherapy verified.  Pt height, weight, and BSA verified.  Appropriate dosing calculations of chemotherapy based on height, weight, and BSA verified.      Administration: Chemotherapy drug Keytruda independently verified with Maddy Gonzalez RN prior to administration.  Original order, appropriateness of regimen, drug supplied, height, weight, BSA, dose calculations, expiration dates/times, drug appearance, and two patient identifiers were verified by both RNs.  Drug checked for vesicant/irritant status and for risk of hypersensitivity.  Most recent laboratory values and allergies, were reviewed.  Appropriate IV access available: Peripheral IV placed within the last 24 hours - see IV flowsheet.  + brisk blood return from site  Sonia Schuler RN and Maddy Gonzalez RN verified correct rate of chemotherapy and maintenance IV fluids.  Patient was educated on chemotherapy regimen prior to administration including indication for treatment related to disease & side effects of chemotherapy drug.  Patient verbalizes understanding of all instructions.

## 2024-01-25 NOTE — PROGRESS NOTES
Chemotherapy treatment discussed with pt.  Each medication, including premedications, discussed with pt.  [facility] education provided to pt.  Pt verbalizes understanding.  Consent for chemotherapy signed and placed on chart.

## 2024-01-25 NOTE — PROGRESS NOTES
4 Eyes Skin Assessment     NAME:  Sarabjit Novoa  YOB: 1965  MEDICAL RECORD NUMBER:  [id number]    The patient is being assessed for  Transfer to New Unit    I agree that at least one RN has performed a thorough Head to Toe Skin Assessment on the patient. ALL assessment sites listed below have been assessed.      Areas assessed by both nurses:    Head, Face, Ears, Shoulders, Back, Chest, Arms, Elbows, Hands, Sacrum. Buttock, Coccyx, Ischium, Legs. Feet and Heels, and Under Medical Devices         Does the Patient have a Wound? No noted wound(s)       Addison Prevention initiated by RN: Yes  Wound Care Orders initiated by RN: No    Pressure Injury (Stage 3,4, Unstageable, DTI, NWPT, and Complex wounds) if present, place Wound referral order by RN under : No    New Ostomies, if present place, Ostomy referral order under : No     Nurse 1 eSignature: Electronically signed by Sonia Schuler RN on 1/24/24 at 7:41 PM EST    **SHARE this note so that the co-signing nurse can place an eSignature**    Nurse 2 eSignature: Electronically signed by Judith Solorzano RN on 1/24/24 at 7:44 PM EST

## 2024-01-26 LAB
ANION GAP SERPL CALCULATED.3IONS-SCNC: 10 MMOL/L (ref 3–16)
ANISOCYTOSIS BLD QL SMEAR: ABNORMAL
BACTERIA BLD CULT ORG #2: NORMAL
BACTERIA BLD CULT: NORMAL
BASOPHILS # BLD: 0 K/UL (ref 0–0.2)
BASOPHILS NFR BLD: 0 %
BUN SERPL-MCNC: 19 MG/DL (ref 7–20)
CALCIUM SERPL-MCNC: 8.7 MG/DL (ref 8.3–10.6)
CHLORIDE SERPL-SCNC: 104 MMOL/L (ref 99–110)
CO2 SERPL-SCNC: 22 MMOL/L (ref 21–32)
CREAT SERPL-MCNC: <0.5 MG/DL (ref 0.8–1.3)
DEPRECATED RDW RBC AUTO: 13.8 % (ref 12.4–15.4)
EOSINOPHIL # BLD: 0 K/UL (ref 0–0.6)
EOSINOPHIL NFR BLD: 0 %
GFR SERPLBLD CREATININE-BSD FMLA CKD-EPI: >60 ML/MIN/{1.73_M2}
GLUCOSE SERPL-MCNC: 149 MG/DL (ref 70–99)
HCT VFR BLD AUTO: 28.1 % (ref 40.5–52.5)
HGB BLD-MCNC: 8.8 G/DL (ref 13.5–17.5)
LYMPHOCYTES # BLD: 2.6 K/UL (ref 1–5.1)
LYMPHOCYTES NFR BLD: 4 %
MCH RBC QN AUTO: 26.6 PG (ref 26–34)
MCHC RBC AUTO-ENTMCNC: 31.3 G/DL (ref 31–36)
MCV RBC AUTO: 85.1 FL (ref 80–100)
MONOCYTES # BLD: 1.3 K/UL (ref 0–1.3)
MONOCYTES NFR BLD: 2 %
NEUTROPHILS # BLD: 62 K/UL (ref 1.7–7.7)
NEUTROPHILS NFR BLD: 94 %
PATH INTERP BLD-IMP: NO
PLATELET # BLD AUTO: 594 K/UL (ref 135–450)
PLATELET BLD QL SMEAR: ABNORMAL
PMV BLD AUTO: 8 FL (ref 5–10.5)
POTASSIUM SERPL-SCNC: 4.5 MMOL/L (ref 3.5–5.1)
RBC # BLD AUTO: 3.3 M/UL (ref 4.2–5.9)
SLIDE REVIEW: ABNORMAL
SODIUM SERPL-SCNC: 136 MMOL/L (ref 136–145)
TOXIC GRANULES BLD QL SMEAR: PRESENT
WBC # BLD AUTO: 66 K/UL (ref 4–11)

## 2024-01-26 PROCEDURE — 6360000002 HC RX W HCPCS: Performed by: INTERNAL MEDICINE

## 2024-01-26 PROCEDURE — 6370000000 HC RX 637 (ALT 250 FOR IP): Performed by: INTERNAL MEDICINE

## 2024-01-26 PROCEDURE — 94761 N-INVAS EAR/PLS OXIMETRY MLT: CPT

## 2024-01-26 PROCEDURE — 2700000000 HC OXYGEN THERAPY PER DAY

## 2024-01-26 PROCEDURE — 85025 COMPLETE CBC W/AUTO DIFF WBC: CPT

## 2024-01-26 PROCEDURE — 80048 BASIC METABOLIC PNL TOTAL CA: CPT

## 2024-01-26 PROCEDURE — 36415 COLL VENOUS BLD VENIPUNCTURE: CPT

## 2024-01-26 PROCEDURE — 1200000000 HC SEMI PRIVATE

## 2024-01-26 PROCEDURE — 6370000000 HC RX 637 (ALT 250 FOR IP): Performed by: NURSE PRACTITIONER

## 2024-01-26 PROCEDURE — 94640 AIRWAY INHALATION TREATMENT: CPT

## 2024-01-26 PROCEDURE — 2580000003 HC RX 258: Performed by: INTERNAL MEDICINE

## 2024-01-26 RX ADMIN — ACETAMINOPHEN 650 MG: 325 TABLET ORAL at 08:18

## 2024-01-26 RX ADMIN — IPRATROPIUM BROMIDE AND ALBUTEROL SULFATE 1 DOSE: .5; 3 SOLUTION RESPIRATORY (INHALATION) at 19:13

## 2024-01-26 RX ADMIN — LEVOFLOXACIN 750 MG: 5 INJECTION, SOLUTION INTRAVENOUS at 19:35

## 2024-01-26 RX ADMIN — SERTRALINE HYDROCHLORIDE 75 MG: 50 TABLET ORAL at 08:18

## 2024-01-26 RX ADMIN — SODIUM CHLORIDE, PRESERVATIVE FREE 10 ML: 5 INJECTION INTRAVENOUS at 20:42

## 2024-01-26 RX ADMIN — POLYETHYLENE GLYCOL 3350 17 G: 17 POWDER, FOR SOLUTION ORAL at 20:42

## 2024-01-26 RX ADMIN — MIRTAZAPINE 7.5 MG: 15 TABLET, FILM COATED ORAL at 20:42

## 2024-01-26 RX ADMIN — STANDARDIZED SENNA CONCENTRATE AND DOCUSATE SODIUM 1 TABLET: 8.6; 5 TABLET ORAL at 08:18

## 2024-01-26 RX ADMIN — OXYCODONE AND ACETAMINOPHEN 1 TABLET: 5; 325 TABLET ORAL at 18:38

## 2024-01-26 RX ADMIN — LISINOPRIL 10 MG: 10 TABLET ORAL at 08:19

## 2024-01-26 RX ADMIN — SODIUM CHLORIDE, PRESERVATIVE FREE 10 ML: 5 INJECTION INTRAVENOUS at 08:27

## 2024-01-26 RX ADMIN — ENOXAPARIN SODIUM 40 MG: 100 INJECTION SUBCUTANEOUS at 08:23

## 2024-01-26 RX ADMIN — OXYCODONE AND ACETAMINOPHEN 1 TABLET: 5; 325 TABLET ORAL at 13:58

## 2024-01-26 RX ADMIN — ALBUTEROL SULFATE 2.5 MG: 2.5 SOLUTION RESPIRATORY (INHALATION) at 07:58

## 2024-01-26 ASSESSMENT — PAIN DESCRIPTION - DESCRIPTORS
DESCRIPTORS: ACHING;THROBBING
DESCRIPTORS: ACHING

## 2024-01-26 ASSESSMENT — PAIN DESCRIPTION - ORIENTATION
ORIENTATION: RIGHT;LEFT
ORIENTATION: LEFT
ORIENTATION: RIGHT
ORIENTATION: RIGHT

## 2024-01-26 ASSESSMENT — PAIN SCALES - GENERAL
PAINLEVEL_OUTOF10: 4
PAINLEVEL_OUTOF10: 7
PAINLEVEL_OUTOF10: 8
PAINLEVEL_OUTOF10: 5

## 2024-01-26 ASSESSMENT — PAIN DESCRIPTION - LOCATION
LOCATION: GENERALIZED;RIB CAGE
LOCATION: RIB CAGE
LOCATION: GENERALIZED;RIB CAGE
LOCATION: RIB CAGE

## 2024-01-26 NOTE — PROGRESS NOTES
[facility]    1/26/2024 11:08 AM        Name: Sarabjit Novoa .              Admitted: 1/22/2024  Primary Care Provider: Markos Quinn MD (Tel: [phone])        Subjective:    Pain is controlled off the morphine requiring some Percocet appetite has improved on the Remeron shortness of breath is improving  Current Medications  morphine (MS CONTIN) extended release tablet 15 mg, BID PRN  mirtazapine (REMERON) tablet 7.5 mg, Nightly  oxyCODONE-acetaminophen (PERCOCET) 5-325 MG per tablet 1 tablet, Q4H PRN  albuterol (PROVENTIL) (2.5 MG/3ML) 0.083% nebulizer solution 2.5 mg, Q4H PRN  sennosides-docusate sodium (SENOKOT-S) 8.6-50 MG tablet 1 tablet, Daily  ipratropium 0.5 mg-albuterol 2.5 mg (DUONEB) nebulizer solution 1 Dose, Q4H PRN  ipratropium 0.5 mg-albuterol 2.5 mg (DUONEB) nebulizer solution 1 Dose, BID RT  lisinopril (PRINIVIL;ZESTRIL) tablet 10 mg, Daily  levoFLOXacin (LEVAQUIN) 750 MG/150ML infusion 750 mg, Q24H  sodium chloride flush 0.9 % injection 5-40 mL, 2 times per day  sodium chloride flush 0.9 % injection 5-40 mL, PRN  0.9 % sodium chloride infusion, PRN  enoxaparin (LOVENOX) injection 40 mg, Daily  polyethylene glycol (GLYCOLAX) packet 17 g, Daily PRN  acetaminophen (TYLENOL) tablet 650 mg, Q6H PRN   Or  acetaminophen (TYLENOL) suppository 650 mg, Q6H PRN  0.9 % sodium chloride infusion, Continuous  sertraline (ZOLOFT) tablet 75 mg, Daily        Objective:  /84   Pulse 91   Temp 98.1 °F (36.7 °C) (Oral)   Resp 18   Ht 1.778 m (5' 10\")   Wt 69.6 kg (153 lb 6.4 oz)   SpO2 92%   BMI 22.01 kg/m²   No intake or output data in the 24 hours ending 01/26/24 1108     Wt Readings from Last 3 Encounters:   01/25/24 69.6 kg (153 lb 6.4 oz)   01/15/24 72.1 kg (159 lb)   12/22/23 78 kg (172 lb)       General appearance:  Appears comfortable. AAOx3  HEENT: atraumatic, Pupils equal, muscous membranes

## 2024-01-26 NOTE — RT PROTOCOL NOTE
RT Inhaler-Nebulizer Bronchodilator Protocol Note    There is a bronchodilator order in the chart from a provider indicating to follow the RT Bronchodilator Protocol and there is an “Initiate RT Inhaler-Nebulizer Bronchodilator Protocol” order as well (see protocol at bottom of note).    CXR Findings:  No results found.    The findings from the last RT Protocol Assessment were as follows:   History Pulmonary Disease: Chronic pulmonary disease  Respiratory Pattern: Regular pattern and RR 12-20 bpm  Breath Sounds: Slightly diminished and/or crackles  Cough: Strong, spontaneous, non-productive  Indication for Bronchodilator Therapy:    Bronchodilator Assessment Score: 4    Aerosolized bronchodilator medication orders have been revised according to the RT Inhaler-Nebulizer Bronchodilator Protocol below.    Respiratory Therapist to perform RT Therapy Protocol Assessment initially then follow the protocol.  Repeat RT Therapy Protocol Assessment PRN for score 0-3 or on second treatment, BID, and PRN for scores above 3.    No Indications - adjust the frequency to every 6 hours PRN wheezing or bronchospasm, if no treatments needed after 48 hours then discontinue using Per Protocol order mode.     If indication present, adjust the RT bronchodilator orders based on the Bronchodilator Assessment Score as indicated below.  Use Inhaler orders unless patient has one or more of the following: on home nebulizer, not able to hold breath for 10 seconds, is not alert and oriented, cannot activate and use MDI correctly, or respiratory rate 25 breaths per minute or more, then use the equivalent nebulizer order(s) with same Frequency and PRN reasons based on the score.  If a patient is on this medication at home then do not decrease Frequency below that used at home.    0-3 - enter or revise RT bronchodilator order(s) to equivalent RT Bronchodilator order with Frequency of every 4 hours PRN for wheezing or increased work of breathing

## 2024-01-26 NOTE — PROGRESS NOTES
01/26/24 0904   RT Protocol   History Pulmonary Disease 2   Respiratory pattern 0   Breath sounds 2   Cough 0   Bronchodilator Assessment Score 4

## 2024-01-26 NOTE — PROGRESS NOTES
Small right-sided pleural effusion is seen.    Upper Abdomen: There is nodularity in the left adrenal gland measuring up 1.2  cm.  There is nodularity in the right adrenal gland measuring up to 2.5 cm.  Ill-defined hypodense nodule seen in the region of the caudate lobe of the  liver measuring 2.2 cm.    Soft Tissues/Bones: Spurring is seen in the spine.  Spurring is seen in the  shoulder joints.    There is bony destruction of the posterior 2nd and 3rd ribs on the right.    There is also bony involvement of the 2nd, 3rd vertebral bodies on the right  soft tissue mass is seen in the chest wall on the right, with the associated  bony involvement, measuring approximately 7.0 cm medial-lateral on image  number 16.  Impression: Multiple pulmonary nodules and masses are seen bilaterally, right greater  than left.  The dominant finding is a large mass in the right upper lobe,  with associated soft tissue extension, and bony destruction-bony  involvement..  There is significant progression since November 2023    Pulmonary nodules are also seen on the left, likely intrapulmonary metastasis    Adrenal metastasis, right greater than left, with suspected concomitant  hepatic metastasis  CT SOFT TISSUE NECK W CONTRAST  Narrative: EXAMINATION:  CT OF THE NECK SOFT TISSUE WITH CONTRAST  1/23/2024    TECHNIQUE:  CT of the neck was performed with the administration of intravenous contrast.  Multiplanar reformatted images are provided for review. Automated exposure  control, iterative reconstruction, and/or weight based adjustment of the  mA/kV was utilized to reduce the radiation dose to as low as reasonably  achievable.    COMPARISON:  None.    HISTORY:  ORDERING SYSTEM PROVIDED HISTORY: evaluate for neck adenopathy  TECHNOLOGIST PROVIDED HISTORY:  Reason for exam:->evaluate for neck adenopathy  Additional Contrast?->None  Reason for Exam: evaluate for neck adenopathy    Initial evaluation.    FINDINGS:  PHARYNX/LARYNX:  The  cage into the right upper  back and right posterior neck including involvement of the right 2nd through  4th ribs and right aspect of the upper thoracic spine.  4. Question of a metastatic lesion involving the left scapula.      Problem List  Patient Active Problem List   Diagnosis    Erectile dysfunction    Hyperlipidemia    Impaired glucose tolerance    Psychosexual dysfunction with inhibited sexual excitement    GENEVIEVE (generalized anxiety disorder)    Nicotine dependence with current use    Primary hypertension    Spondylosis of lumbar region without myelopathy or radiculopathy    Arthritis of first metatarsophalangeal (MTP) joint of right foot    Vaccine counseling    Malignant neoplasm of right lung (HCC)    Metastatic cancer to bone (HCC)    Bacterial pneumonia    Severe malnutrition (HCC)       ASSESSMENT AND PLAN:  Metastatic poorly differentiated non-small cell carcinoma of the lung to the bone and lymph nodes right upper lobe mass with multiple pulmonary nodules as well as direct rib invasion.  [facility]is NGS is pending.   Taxol, carboplatin and pembrolizumab 1/25/24.  Tolerated well.   Pneumonia and bronchitis  Leukocytosis and thrombocytosis.  Leukemoid reaction. Will send FISH for CML.  Anemia  Neoplastic pain.  Pain under good control.  Weight loss  Abnormal liver function tests.  Hold Lipitor.  Avoid liver offending drug if possible.   45-pack-year smoking history and he quit in December 2023.    Tolerated first cycle of chemotherapy and immunotherapy.  Reviewed his labs.  Persistent leukocytosis.  Anemia and thrombocytosis remain. FISH for BCR/ABL was ordered. Doubt CML and likely leukemoid reaction.  Okay to discharge tomorrow if he is stable.       ONCOLOGIC DISPOSITION:      Filemon Laws MD  Please contact through [employer]

## 2024-01-26 NOTE — PROGRESS NOTES
intravenous contrast.    COMPARISON:  CT neck/chest 01/23/2024    HISTORY:  ORDERING SYSTEM PROVIDED HISTORY: neck mass  TECHNOLOGIST PROVIDED HISTORY:  Reason for exam:->neck mass  Reason for Exam: neck mass  inj. 15 ml of multihance  gfr over 60 today    FINDINGS:  PHARYNX/LARYNX: The palatine tonsils are normal in appearance.  The tongue is  normal in appearance.  The valleculae, epiglottis, aryepiglottic folds and  pyriform sinuses appear unremarkable.  The true and false vocal cords are  normal in appearance.  No mass or abscess is seen.    SALIVARY GLANDS/THYROID: The right parotid and submandibular glands appear  unremarkable.  The thyroid gland appears unremarkable.  There is enhancing  soft tissue nodule measuring 10 x 15 mm along the superior aspect of the left  superficial parotid gland, partially exophytic extending into the left  pre-auricular soft tissues, likely reflecting metastatic lymph node.    LYMPH NODES: Right-sided jugular chain and supraclavicular lymphadenopathy  again seen.    SOFT TISSUES: Large right lung apex soft tissue mass with associated  destructive changes of the right posterior 2nd through 4th ribs with  associated neural foraminal invasion and involvement of the right-sided  transverse processes and lamina.  There is also partially evaluated epidural  soft tissue invasion along the thoracic canal at T1 through T3.  This mass is  contiguous with the right paraspinal cervical soft tissue mass.    BRAIN/ORBITS/SINUSES: The visualized portion of the intracranial contents  appear unremarkable.  The visualized portion of the orbits, paranasal sinuses  and mastoid air cells demonstrate no acute abnormality.    BONES:  See above.  Impression: 1. Large right lung apex soft tissue mass with associated destructive changes  of the right posterior 2nd through 4th ribs with associated neural foraminal  invasion and involvement of the right-sided transverse processes and lamina.  There is also  right aspect of the upper thoracic spine.  4. Question of a metastatic lesion involving the left scapula.      Problem List  Patient Active Problem List   Diagnosis    Erectile dysfunction    Hyperlipidemia    Impaired glucose tolerance    Psychosexual dysfunction with inhibited sexual excitement    GENEVIEVE (generalized anxiety disorder)    Nicotine dependence with current use    Primary hypertension    Spondylosis of lumbar region without myelopathy or radiculopathy    Arthritis of first metatarsophalangeal (MTP) joint of right foot    Vaccine counseling    Malignant neoplasm of right lung (HCC)    Metastatic cancer to bone (HCC)    Bacterial pneumonia    Severe malnutrition (HCC)       ASSESSMENT AND PLAN:  Metastatic poorly differentiated non-small cell carcinoma of the lung to the bone and lymph nodes right upper lobe mass with multiple pulmonary nodules as well as direct rib invasion.  Caris NGS is pending.  Pneumonia and bronchitis  Leukocytosis and thrombocytosis.  Leukemoid reaction.   Anemia  Neoplastic pain.  Pain under good control.  Weight loss  Abnormal liver function tests.  Hold Lipitor.  Avoid liver offending drug if possible.   45-pack-year smoking history and he quit in December 2023.    Reviewed his labs.  Persistent leukocytosis.  Anemia and thrombocytosis remain.  Taxol, carboplatin and pembrolizumab today.  Tolerating so far.      ONCOLOGIC DISPOSITION:      Filemon Laws MD  Please contact through [employer]

## 2024-01-26 NOTE — PROGRESS NOTES
Completion of Chemotherapy: Monitoring during infusion done per policy, see Flowsheets.  Blood return verified before, during, and after infusion per policy; no signs of extravasation.  Pt tolerated chemotherapy well and without incident.  Chemotherapy infusion end time on the MAR.  Will continue to monitor.

## 2024-01-26 NOTE — PROGRESS NOTES
Original chemotherapy orders reviewed and acknowledged. Appropriateness of chemotherapy treatment regimen Keytruda, Taxol , Carboplatin for diagnosis of Non small cell lung cancer was verified.  Patient educated on chemotherapy regimen.  Acknowledgement of informed consent for chemotherapy verified.  Pt height, weight, and BSA verified.  Appropriate dosing calculations of chemotherapy based on height, weight, and BSA verified.       Administration: Chemotherapy drug Carboplatin independently verified with Maddy Gonzalez RN prior to administration.  Original order, appropriateness of regimen, drug supplied, height, weight, BSA, dose calculations, expiration dates/times, drug appearance, and two patient identifiers were verified by both RNs.  Drug checked for vesicant/irritant status and for risk of hypersensitivity.  Most recent laboratory values and allergies, were reviewed.  Appropriate IV access available: Peripheral IV placed within the last 24 hours - see IV flowsheet.  + brisk blood return from site  Sonia Schuler RN and Maddy Gonzalez RN verified correct rate of chemotherapy and maintenance IV fluids.  Patient was educated on chemotherapy regimen prior to administration including indication for treatment related to disease & side effects of chemotherapy drug.  Patient verbalizes understanding of all instructions.

## 2024-01-26 NOTE — CARE COORDINATION
Case Management Assessment  Initial Evaluation    Date/Time of Evaluation: 1/26/2024 12:33 PM  Assessment Completed by: KAREN Tabares, LSW    If patient is discharged prior to next notation, then this note serves as note for discharge by case management.    Patient Name: Sarabjit Novoa                   YOB: 1965  Diagnosis: Bacterial pneumonia [J15.9]  Leukocytosis, unspecified type [D72.829]  Chest pain, unspecified type [R07.9]  Pneumonia due to infectious organism, unspecified laterality, unspecified part of lung [J18.9]                   Date / Time: 1/22/2024 11:49 AM    Patient Admission Status: Inpatient   Readmission Risk (Low < 19, Mod (19-27), High > 27): Readmission Risk Score: 16.9    Current PCP: Markos Quinn MD  PCP verified by CM? Yes    Chart Reviewed: Yes      History Provided by: Patient  Patient Orientation: Alert and Oriented    Patient Cognition: Alert    Hospitalization in the last 30 days (Readmission):  No    If yes, Readmission Assessment in  Navigator will be completed.    Advance Directives:      Code Status: DNR-CCA   Patient's Primary Decision Maker is:        Discharge Planning:    Patient lives with: Spouse/Significant Other, Children (w/spouse and dtr) Type of Home: House (1 level - 2 step)  Primary Care Giver: Self  Patient Support Systems include: Spouse/Significant Other, Children   Current Financial resources:  ([facility])  Current community resources: None  Current services prior to admission: Durable Medical Equipment            Current DME: Other (Comment) (4WW, cane, grab bars, shower chair)            Type of Home Care services:  OT, PT, Nursing Services    ADLS  Prior functional level: Independent in ADLs/IADLs  Current functional level: Mobility (needs [facility] accepted)    PT AM-PAC: 18 /24  OT AM-PAC: 15 /24    Family can provide assistance at DC: Yes  Would you like Case Management to discuss the discharge plan with any other family

## 2024-01-26 NOTE — PROGRESS NOTES
Nutrition Note    RECOMMENDATIONS  PO Diet: Regular  ONS: Ensure & magic cup with meals.      NUTRITION ASSESSMENT   Pt triggered for f/u assessment.  Receives a regular diet & reports is eating better & feeling better overall.  Pt ate % bkf this am.  Reports drinking Ensure daily.  WT is up 9 lb from admission; question accuracy of bedscale. Will con't to monitor for adequate intake.     Nutrition Related Findings: No edema noted; LBM 1/23; gluc 149  Wounds: None  Nutrition Education:  Education not indicated   Nutrition Goals: PO intake 50% or greater     MALNUTRITION ASSESSMENT   Acute Illness  Malnutrition Status: Severe malnutrition    NUTRITION DIAGNOSIS   Severe malnutrition related to catabolic illness, inadequate protein-energy intake as evidenced by poor intake prior to admission, Criteria as identified in malnutrition assessment    CURRENT NUTRITION THERAPIES  ADULT DIET; Regular  ADULT ORAL NUTRITION SUPPLEMENT; Breakfast, Lunch, Dinner; Frozen Oral Supplement  ADULT ORAL NUTRITION SUPPLEMENT; Breakfast, Lunch, Dinner; Standard High Calorie/High Protein Oral Supplement     PO Intake: %, 1-25% (variable intake, but improving)   PO Supplement Intake:% (per pt)    ANTHROPOMETRICS  Current Height: 177.8 cm (5' 10\")  Current Weight - Scale: 69.6 kg (153 lb 6.4 oz)    Ideal Body Weight (IBW): 166 lbs  (75 kg)    Usual Bodyweight 76.2 kg (168 lb)       BMI: 22      COMPARATIVE STANDARDS  Total Energy Requirements (kcals/day): 1643- 1971     Protein (g):  79- 131       Fluid (mL/day):  1 ml/kcal    The patient will be monitored per nutrition standards of care. Consult dietitian if additional nutrition interventions are needed prior to RD reassessment.     Linda Kim RD, LD    Contact: [phone]

## 2024-01-27 VITALS
SYSTOLIC BLOOD PRESSURE: 113 MMHG | HEIGHT: 70 IN | HEART RATE: 119 BPM | BODY MASS INDEX: 21.96 KG/M2 | OXYGEN SATURATION: 91 % | RESPIRATION RATE: 20 BRPM | TEMPERATURE: 98.5 F | WEIGHT: 153.4 LBS | DIASTOLIC BLOOD PRESSURE: 68 MMHG

## 2024-01-27 LAB
ANION GAP SERPL CALCULATED.3IONS-SCNC: 10 MMOL/L (ref 3–16)
BASOPHILS # BLD: 0 K/UL (ref 0–0.2)
BASOPHILS NFR BLD: 0 %
BUN SERPL-MCNC: 26 MG/DL (ref 7–20)
CALCIUM SERPL-MCNC: 8.8 MG/DL (ref 8.3–10.6)
CHLORIDE SERPL-SCNC: 104 MMOL/L (ref 99–110)
CO2 SERPL-SCNC: 22 MMOL/L (ref 21–32)
CREAT SERPL-MCNC: <0.5 MG/DL (ref 0.8–1.3)
DEPRECATED RDW RBC AUTO: 13.8 % (ref 12.4–15.4)
EOSINOPHIL # BLD: 0 K/UL (ref 0–0.6)
EOSINOPHIL NFR BLD: 0 %
GFR SERPLBLD CREATININE-BSD FMLA CKD-EPI: >60 ML/MIN/{1.73_M2}
GLUCOSE SERPL-MCNC: 116 MG/DL (ref 70–99)
HCT VFR BLD AUTO: 28.1 % (ref 40.5–52.5)
HGB BLD-MCNC: 9.3 G/DL (ref 13.5–17.5)
LYMPHOCYTES # BLD: 1.4 K/UL (ref 1–5.1)
LYMPHOCYTES NFR BLD: 3 %
MCH RBC QN AUTO: 27.9 PG (ref 26–34)
MCHC RBC AUTO-ENTMCNC: 32.9 G/DL (ref 31–36)
MCV RBC AUTO: 84.7 FL (ref 80–100)
MONOCYTES # BLD: 0.5 K/UL (ref 0–1.3)
MONOCYTES NFR BLD: 1 %
NEUTROPHILS # BLD: 44.5 K/UL (ref 1.7–7.7)
NEUTROPHILS NFR BLD: 96 %
PATH INTERP BLD-IMP: NO
PLATELET # BLD AUTO: 550 K/UL (ref 135–450)
PLATELET BLD QL SMEAR: ABNORMAL
PMV BLD AUTO: 7.8 FL (ref 5–10.5)
POLYCHROMASIA BLD QL SMEAR: ABNORMAL
POTASSIUM SERPL-SCNC: 4.5 MMOL/L (ref 3.5–5.1)
RBC # BLD AUTO: 3.32 M/UL (ref 4.2–5.9)
SLIDE REVIEW: ABNORMAL
SODIUM SERPL-SCNC: 136 MMOL/L (ref 136–145)
TOXIC GRANULES BLD QL SMEAR: PRESENT
WBC # BLD AUTO: 46.4 K/UL (ref 4–11)

## 2024-01-27 PROCEDURE — 94761 N-INVAS EAR/PLS OXIMETRY MLT: CPT

## 2024-01-27 PROCEDURE — 94680 O2 UPTK RST&XERS DIR SIMPLE: CPT

## 2024-01-27 PROCEDURE — 36415 COLL VENOUS BLD VENIPUNCTURE: CPT

## 2024-01-27 PROCEDURE — 2700000000 HC OXYGEN THERAPY PER DAY

## 2024-01-27 PROCEDURE — 6360000002 HC RX W HCPCS: Performed by: INTERNAL MEDICINE

## 2024-01-27 PROCEDURE — 6370000000 HC RX 637 (ALT 250 FOR IP): Performed by: NURSE PRACTITIONER

## 2024-01-27 PROCEDURE — 6370000000 HC RX 637 (ALT 250 FOR IP): Performed by: INTERNAL MEDICINE

## 2024-01-27 PROCEDURE — 80048 BASIC METABOLIC PNL TOTAL CA: CPT

## 2024-01-27 PROCEDURE — 94640 AIRWAY INHALATION TREATMENT: CPT

## 2024-01-27 PROCEDURE — 85025 COMPLETE CBC W/AUTO DIFF WBC: CPT

## 2024-01-27 RX ORDER — MIRTAZAPINE 7.5 MG/1
7.5 TABLET, FILM COATED ORAL NIGHTLY
Qty: 30 TABLET | Refills: 3 | Status: SHIPPED | OUTPATIENT
Start: 2024-01-27

## 2024-01-27 RX ORDER — SENNA AND DOCUSATE SODIUM 50; 8.6 MG/1; MG/1
1 TABLET, FILM COATED ORAL DAILY
Qty: 60 TABLET | Refills: 1 | Status: SHIPPED | OUTPATIENT
Start: 2024-01-28

## 2024-01-27 RX ADMIN — STANDARDIZED SENNA CONCENTRATE AND DOCUSATE SODIUM 1 TABLET: 8.6; 5 TABLET ORAL at 09:19

## 2024-01-27 RX ADMIN — SERTRALINE HYDROCHLORIDE 75 MG: 50 TABLET ORAL at 09:19

## 2024-01-27 RX ADMIN — ACETAMINOPHEN 650 MG: 325 TABLET ORAL at 09:25

## 2024-01-27 RX ADMIN — ENOXAPARIN SODIUM 40 MG: 100 INJECTION SUBCUTANEOUS at 09:19

## 2024-01-27 RX ADMIN — OXYCODONE AND ACETAMINOPHEN 1 TABLET: 5; 325 TABLET ORAL at 00:14

## 2024-01-27 RX ADMIN — LISINOPRIL 10 MG: 10 TABLET ORAL at 09:19

## 2024-01-27 RX ADMIN — IPRATROPIUM BROMIDE AND ALBUTEROL SULFATE 1 DOSE: .5; 3 SOLUTION RESPIRATORY (INHALATION) at 08:07

## 2024-01-27 RX ADMIN — IPRATROPIUM BROMIDE AND ALBUTEROL SULFATE 1 DOSE: .5; 3 SOLUTION RESPIRATORY (INHALATION) at 13:02

## 2024-01-27 ASSESSMENT — PAIN SCALES - GENERAL
PAINLEVEL_OUTOF10: 3
PAINLEVEL_OUTOF10: 8
PAINLEVEL_OUTOF10: 3
PAINLEVEL_OUTOF10: 5
PAINLEVEL_OUTOF10: 0

## 2024-01-27 ASSESSMENT — PAIN DESCRIPTION - LOCATION
LOCATION: RIB CAGE
LOCATION: BACK

## 2024-01-27 ASSESSMENT — PAIN DESCRIPTION - ORIENTATION
ORIENTATION: RIGHT
ORIENTATION: MID

## 2024-01-27 NOTE — PROGRESS NOTES
Shift assessment completed by Abbey Reinoso RN. This RN agrees with assessment documented. Routine vitals obtained by Abbey Reinoso RN. Patient c/o pain, 3/10. PRN tylenol administered along with other scheduled medications given by Abbey Reinoso RN. Patient is awake, alert and oriented. Patient does not appear to be in distress, resting comfortably at this time. Call light within reach. Bed alarm engaged. Bed locked and in low position. Fall precautions in place. No further needs expressed.

## 2024-01-27 NOTE — PLAN OF CARE
Problem: Discharge Planning  Goal: Discharge to home or other facility with appropriate resources  Outcome: Adequate for Discharge     Problem: Pain  Goal: Verbalizes/displays adequate comfort level or baseline comfort level  Outcome: Adequate for Discharge     Problem: Safety - Adult  Goal: Free from fall injury  Outcome: Adequate for Discharge     Problem: ABCDS Injury Assessment  Goal: Absence of physical injury  Outcome: Adequate for Discharge     Problem: Nutrition Deficit:  Goal: Optimize nutritional status  Outcome: Adequate for Discharge     Problem: Respiratory - Adult  Goal: Achieves optimal ventilation and oxygenation  Outcome: Adequate for Discharge     Problem: Cardiovascular - Adult  Goal: Maintains optimal cardiac output and hemodynamic stability  Outcome: Adequate for Discharge     Problem: Musculoskeletal - Adult  Goal: Return mobility to safest level of function  Outcome: Adequate for Discharge

## 2024-01-27 NOTE — DISCHARGE SUMMARY
Hospital Medicine Discharge Summary    Patient: Sarabjit Novoa     Gender: male  : 1965   Age: 58 y.o.  MRN: [id number]    Admitting Physician: Maria Ines Rivera MD  Discharge Physician: Maria Ines Rivera MD     Code Status: DNR-CCA     Admit Date: 2024   Discharge Date:   2024    Disposition:  Home  Time spent arranging discharge: 31 minutes    Discharge Diagnoses:    Active Hospital Problems    Diagnosis Date Noted    Severe malnutrition (HCC) [E43] 2024    Bacterial pneumonia [J15.9] 2024         Condition at Discharge:  Stable    Hospital Course:   Sepsis secondary to pna treated with course of iv atbx  Metastatic poorly differentiated non-small cell carcinoma of the lung to the bone and lymph nodes right upper lobe mass with multiple pulmonary nodules as well as direct rib invasion  Started chemotherapy wbc improving cleared for discharge by onclogy with fu with onolocy as outpatient discused with DR. Laws  Discharge Exam:    /74   Pulse (!) 107   Temp 98.1 °F (36.7 °C) (Oral)   Resp 16   Ht 1.778 m (5' 10\")   Wt 69.6 kg (153 lb 6.4 oz)   SpO2 93%   BMI 22.01 kg/m²   General appearance:  Appears comfortable. AAOx3  HEENT: atraumatic, Pupils equal, muscous membranes moist, no masses appreciated  Cardiovascular: Regular rate and rhythm no murmurs appreciated  Respiratory: CTAB no wheezing  Gastrointestinal: Abdomen soft, non-tender, BS+  EXT: no edema  Neurology: no gross focal deficts  Psychiatry: Appropriate affect. Not agitated  Skin: Warm, dry, no rashes appreciated    Discharge Medications:   Current Discharge Medication List        START taking these medications    Details   mirtazapine (REMERON) 7.5 MG tablet Take 1 tablet by mouth nightly  Qty: 30 tablet, Refills: 3      sennosides-docusate sodium (SENOKOT-S) 8.6-50 MG tablet Take 1 tablet by mouth daily  Qty: 60 tablet, Refills: 1           Current Discharge Medication List        Current Discharge  evidence of an acute intracranial hemorrhage. The ventricles and sulci are normal in size and configuration.  The sellar/suprasellar regions appear unremarkable.  The normal signal voids within the major intracranial vessels appear maintained.  No abnormal focus of enhancement is seen within the brain. Mild chronic microvascular disease is identified within the periventricular white matter. ORBITS: The visualized portion of the orbits demonstrate no acute abnormality. SINUSES: The visualized paranasal sinuses and mastoid air cells demonstrate no acute abnormality. BONES/SOFT TISSUES: The bone marrow signal intensity appears normal. The soft tissues demonstrate no acute abnormality.  There is a nodule within the left parotid gland measuring 1.5 cm.  Peripheral enhancement is appreciated.  This finding may represent benign or malignant parotid gland tumor.  Follow-up imaging or biopsy is suggested There is partial visualization of large 5.5 cm enhancing mass within the right posterior neck.  MRI of the cervical spine with contrast is suggested for further evaluation.     1. No intracranial metastatic disease. 2. There is partial visualization of large 5.5 cm enhancing mass within the right posterior neck.  MRI of the cervical spine with contrast is suggested for further evaluation for a soft tissue metastasis. 3. Peripheral enhancing nodule within the left parotid gland measuring 1.5 cm.  This finding may represent benign or malignant parotid gland tumor. Follow-up imaging or biopsy is suggested.         Signed:    Maria Ines Rivera MD   1/27/2024

## 2024-01-27 NOTE — PROGRESS NOTES
This patient has had a discharge order placed. They are returning home and being picked up in the lobby by their wife. Discharge paperwork has been printed, highlighted, and gone over with the patient by this RN. Patient understands teaching and has no further questions at this time. IV has been removed with no complications. Telemetry has been removed. Pt has all belongings present. No further needs expressed.

## 2024-01-27 NOTE — CARE COORDINATION
Case Management -  Discharge Note      Patient Name: Sarabjit Novoa                   YOB: 1965            Readmission Risk (Low < 19, Mod (19-27), High > 27): Readmission Risk Score: 17.7    Current PCP: Markos Quinn MD    ([facility]) Important Message from [facility] :    Date: 1/27/2024    PT AM-PAC: 18 /24  OT AM-PAC: 15 /24    Patient/patient representative has been educated on the benefits of HHC as well as the possible risks of declining recommended services. Patient/patient representative has acknowledged the information provided and decided on the following discharge plan. Patient/ patient representative has been provided freedom of choice regarding service provider, supported by basic dialogue that supports the patient's individualized plan of care/goals.    Home Care Information:   Is patient resuming current home health care services: No    Home Care Agency:   [facility]  Phone: [phone]  Fax: [phone]             Services: Patient requires the following home health services: Physical Therapy  Patient requires the following home health services: Occupational Therapy  Patient requires the following home health services: Skilled Nursing  Home Health Order Obtained: YES    Home health agency notified of discharge.  Maggie at [facility] reports she is aware of discharge and scheduled to see Patient tomorrow.     Home Oxygen and Respiratory Equipment:     Has HOME OXYGEN prior to admission: No    If applicable: Informed of need to bring portable home O2 tank on day of DISCHARGE for nursing to connect prior to leaving: n/a  Verbalized agreement/Understanding:n/a    Current Liter Flow/min:  2 LPM    Oxygen Provider:   Name:  Jimena Art) [facility]  Phone:  [phone]  Fax:      [phone]       Home oxygen evaluation and DME order on file:    DME Order for Home Oxygen as OP  ONE TIME     Complete     Comments: You must complete the order parameters below  and add the medical necessity documentation for this DME in a separate note.    Stationary Oxygen Concentrator at 2 lpm via Nasal Cannula    Stationary Prescribed at:  Continuous    Portable Gaseous O2 System and contents at 2 lpm via Nasal Cannula    3-4hrs/day    Diagnosis: cancer  Length of need: 3 Months   Electronically Signed By: Maria Ines Rivera MD [NPI: [id number]]        CM deliver Portal concentrator to Patient room. Patient signed form. CM advise Pt he will need credit co Monday for 02 delivery.   CM reported estimated $15.84 a month ( per Rose at [employer].)     Electronically signed by Nelly Bradford on 1/27/2024 at 3:42 PM      Financial    Payor:  EAST / Plan:  EAST / Product Type: *No Product type* /     Pharmacy:  Potential assistance Purchasing Medications: No  Meds-to-Beds request:        [id number] [address] - P [phone] - F [phone]  [facility] [address]  [facility] [address]  Phone: [phone] Fax: [phone]      Notes:    Additional Case Management Notes: n/a

## 2024-01-27 NOTE — CARE COORDINATION
01/27/24 1519   IMM Letter   IMM Letter given to Patient/Family/Significant other/Guardian/POA/by: CM provided [employer] IMM Patient signed. Pt reports he only has [employer] (No Medicare)   IMM Letter date given: 01/27/24   IMM Letter time given: 1519     Electronically signed by Nelly Bradford on 1/27/2024 at 3:19 PM

## 2024-01-27 NOTE — PROGRESS NOTES
01/27/24 1250   Resting (Room Air)   SpO2 88      Resting (On O2)   SpO2 91      O2 Flow Rate (l/min) 1 l/min   During Walk (Room Air)   SpO2 87      Walk/Assistance Device Ambulation   Rate of Dyspnea 2   Symptoms Shortness of breath;Fatigue   During Walk (On O2)   SpO2 90      O2 Device Nasal cannula   O2 Flow Rate (l/min) 1 l/min   Walk/Assistance Device Ambulation   Rate of Dyspnea 2   Symptoms Fatigue;Shortness of breath   After Walk   SpO2 92      O2 Device Nasal cannula   O2 Flow Rate (l/min) 2 l/min   Rate of Dyspnea 1   Does the Patient Qualify for Home O2 Yes   Liter Flow at Rest 1   Liter Flow on Exertion 2   Does the Patient Need Portable Oxygen Tanks Yes

## 2024-01-27 NOTE — PROGRESS NOTES
with  associated neural foraminal invasion and involvement of the right-sided  transverse processes and lamina.  There is also partially evaluated epidural  soft tissue invasion along the thoracic canal at T1 through T3.  This mass is  contiguous with the right paraspinal cervical soft tissue mass.    BRAIN/ORBITS/SINUSES: The visualized portion of the intracranial contents  appear unremarkable.  The visualized portion of the orbits, paranasal sinuses  and mastoid air cells demonstrate no acute abnormality.    BONES:  See above.  Impression: 1. Large right lung apex soft tissue mass with associated destructive changes  of the right posterior 2nd through 4th ribs with associated neural foraminal  invasion and involvement of the right-sided transverse processes and lamina.  There is also partially evaluated epidural soft tissue invasion along the  thoracic canal at T1 through T3. This mass is contiguous with the right  paraspinal cervical soft tissue mass.  2. Enhancing soft tissue nodule measuring 10 x 15 mm along the superior  aspect of the left superficial parotid gland, partially exophytic extending  into the left pre-auricular soft tissues, likely reflecting metastatic lymph  node.  3. Right-sided jugular chain and supraclavicular lymphadenopathy again seen.  CT CHEST W CONTRAST  Narrative: EXAMINATION:  CT OF THE CHEST WITH CONTRAST 1/23/2024 11:19 am    TECHNIQUE:  CT of the chest was performed with the administration of intravenous  contrast. Multiplanar reformatted images are provided for review. Automated  exposure control, iterative reconstruction, and/or weight based adjustment of  the mA/kV was utilized to reduce the radiation dose to as low as reasonably  achievable.    COMPARISON:  November 2023    HISTORY:  ORDERING SYSTEM PROVIDED HISTORY: to evaluate for lung mass  TECHNOLOGIST PROVIDED HISTORY:  Reason for exam:->to evaluate for lung mass  Additional Contrast?->None  Reason for Exam: to evaluate for  bony irregularity involving the right 2nd, 3rd and  4th ribs as well as the right aspect of the thoracic spine at these levels.  There appears to be extension of this mass into the right epidural space.  Presumed metastatic focus involving the left scapula.  Impression: 1. Enlarged abnormal appearing right jugular chain and right supraclavicular  lymph nodes.  2. There is question of a 1.3 cm lesion in the region of the left parotid  gland.  This may represent an enlarged intraparotid/periparotid lymph node  versus a primary parotid lesion.  3. Partial visualization of a large heterogeneously enhancing mass involving  the right orbital apex with extension 3 thoracic cage into the right upper  back and right posterior neck including involvement of the right 2nd through  4th ribs and right aspect of the upper thoracic spine.  4. Question of a metastatic lesion involving the left scapula.      Problem List  Patient Active Problem List   Diagnosis    Erectile dysfunction    Hyperlipidemia    Impaired glucose tolerance    Psychosexual dysfunction with inhibited sexual excitement    GENEVIEVE (generalized anxiety disorder)    Nicotine dependence with current use    Primary hypertension    Spondylosis of lumbar region without myelopathy or radiculopathy    Arthritis of first metatarsophalangeal (MTP) joint of right foot    Vaccine counseling    Malignant neoplasm of right lung (HCC)    Metastatic cancer to bone (HCC)    Bacterial pneumonia    Severe malnutrition (HCC)       ASSESSMENT AND PLAN:  1.  Stage IV non-small cell lung cancer.  He tolerated chemo well.    2.  Pneumonia.  He is clinically improving.    Follow-up with Dr. Laws.            ONCOLOGIC DISPOSITION:      Omi Spangler Jr, MD  Please contact through [employer]

## 2024-01-29 ENCOUNTER — TELEPHONE (OUTPATIENT)
Dept: PRIMARY CARE CLINIC | Age: 59
End: 2024-01-29

## 2024-01-29 NOTE — TELEPHONE ENCOUNTER
Patient's wife called to schedule a hospital follow up visit with Dr. Quinn. Patient was at [facility] and did appear on the follow up list to be scheduled. I explained to the patient's wife that Norm would call back and go over some questions he had for the patient.

## 2024-01-29 NOTE — TELEPHONE ENCOUNTER
Care Transitions Initial Follow Up Call    Outreach made within 2 business days of discharge: Yes    Patient: Sarabjit Novoa Patient : 1965   MRN: [id number]  Reason for Admission: There are no discharge diagnoses documented for the most recent discharge.  Discharge Date: 24       Spoke with: Tia (wife)    Discharge department/facility: [facility] Interactive Patient Contact:  Was patient able to fill all prescriptions: Yes  Was patient instructed to bring all medications to the follow-up visit: Yes  Is patient taking all medications as directed in the discharge summary? Yes  Does patient understand their discharge instructions: Yes  Does patient have questions or concerns that need addressed prior to 7-14 day follow up office visit: no    Scheduled appointment with PCP within 7-14 days  Follow up discussed, due to patients low immunity they wish to call for follow up only if needed.  Follow Up  Future Appointments   Date Time Provider Department Center   2024 10:30 AM Angelica Rosales MD PULM & CC [facility]   2024  1:00 PM Markos Quinn MD [place]  [place]  MARIE Rascon MA

## 2024-02-07 ENCOUNTER — APPOINTMENT (OUTPATIENT)
Dept: CT IMAGING | Age: 59
DRG: 871 | End: 2024-02-07
Payer: OTHER GOVERNMENT

## 2024-02-07 ENCOUNTER — HOSPITAL ENCOUNTER (INPATIENT)
Age: 59
LOS: 6 days | Discharge: HOSPICE | DRG: 871 | End: 2024-02-13
Attending: STUDENT IN AN ORGANIZED HEALTH CARE EDUCATION/TRAINING PROGRAM | Admitting: INTERNAL MEDICINE
Payer: OTHER GOVERNMENT

## 2024-02-07 ENCOUNTER — APPOINTMENT (OUTPATIENT)
Dept: GENERAL RADIOLOGY | Age: 59
DRG: 871 | End: 2024-02-07
Payer: OTHER GOVERNMENT

## 2024-02-07 DIAGNOSIS — A41.9 SEPTIC SHOCK (HCC): Primary | ICD-10-CM

## 2024-02-07 DIAGNOSIS — R65.21 SEPTIC SHOCK (HCC): Primary | ICD-10-CM

## 2024-02-07 DIAGNOSIS — D64.9 ANEMIA, UNSPECIFIED TYPE: ICD-10-CM

## 2024-02-07 DIAGNOSIS — Z79.899 ON ANTINEOPLASTIC CHEMOTHERAPY: ICD-10-CM

## 2024-02-07 DIAGNOSIS — E87.1 HYPONATREMIA: ICD-10-CM

## 2024-02-07 DIAGNOSIS — D84.9 IMMUNOCOMPROMISED (HCC): ICD-10-CM

## 2024-02-07 PROBLEM — J96.21 ACUTE ON CHRONIC RESPIRATORY FAILURE WITH HYPOXIA (HCC): Status: ACTIVE | Noted: 2024-02-07

## 2024-02-07 LAB
ALBUMIN SERPL-MCNC: 2.3 G/DL (ref 3.4–5)
ALBUMIN/GLOB SERPL: 0.8 {RATIO} (ref 1.1–2.2)
ALP SERPL-CCNC: 359 U/L (ref 40–129)
ALT SERPL-CCNC: 41 U/L (ref 10–40)
ANION GAP SERPL CALCULATED.3IONS-SCNC: 11 MMOL/L (ref 3–16)
ANION GAP SERPL CALCULATED.3IONS-SCNC: 12 MMOL/L (ref 3–16)
ANISOCYTOSIS BLD QL SMEAR: ABNORMAL
AST SERPL-CCNC: 34 U/L (ref 15–37)
BACTERIA URNS QL MICRO: NORMAL /HPF
BASOPHILS # BLD: 0 K/UL (ref 0–0.2)
BASOPHILS NFR BLD: 0 %
BILIRUB SERPL-MCNC: 0.3 MG/DL (ref 0–1)
BILIRUB UR QL STRIP.AUTO: NEGATIVE
BUN SERPL-MCNC: 11 MG/DL (ref 7–20)
BUN SERPL-MCNC: 12 MG/DL (ref 7–20)
CALCIUM SERPL-MCNC: 9 MG/DL (ref 8.3–10.6)
CALCIUM SERPL-MCNC: 9.8 MG/DL (ref 8.3–10.6)
CHLORIDE SERPL-SCNC: 88 MMOL/L (ref 99–110)
CHLORIDE SERPL-SCNC: 89 MMOL/L (ref 99–110)
CLARITY UR: CLEAR
CO2 SERPL-SCNC: 23 MMOL/L (ref 21–32)
CO2 SERPL-SCNC: 23 MMOL/L (ref 21–32)
COLOR UR: YELLOW
CREAT SERPL-MCNC: 0.6 MG/DL (ref 0.8–1.3)
CREAT SERPL-MCNC: <0.5 MG/DL (ref 0.8–1.3)
DEPRECATED RDW RBC AUTO: 15 % (ref 12.4–15.4)
EOSINOPHIL # BLD: 0 K/UL (ref 0–0.6)
EOSINOPHIL NFR BLD: 0 %
EPI CELLS #/AREA URNS AUTO: 0 /HPF (ref 0–5)
ETHANOLAMINE SERPL-MCNC: NORMAL MG/DL (ref 0–0.08)
FLUAV RNA RESP QL NAA+PROBE: NOT DETECTED
FLUBV RNA RESP QL NAA+PROBE: NOT DETECTED
GFR SERPLBLD CREATININE-BSD FMLA CKD-EPI: >60 ML/MIN/{1.73_M2}
GFR SERPLBLD CREATININE-BSD FMLA CKD-EPI: >60 ML/MIN/{1.73_M2}
GLUCOSE SERPL-MCNC: 111 MG/DL (ref 70–99)
GLUCOSE SERPL-MCNC: 166 MG/DL (ref 70–99)
GLUCOSE UR STRIP.AUTO-MCNC: NEGATIVE MG/DL
HCT VFR BLD AUTO: 24.4 % (ref 40.5–52.5)
HGB BLD-MCNC: 7.8 G/DL (ref 13.5–17.5)
HGB UR QL STRIP.AUTO: NEGATIVE
HYALINE CASTS #/AREA URNS AUTO: 0 /LPF (ref 0–8)
KETONES UR STRIP.AUTO-MCNC: NEGATIVE MG/DL
LACTATE BLDV-SCNC: 2.3 MMOL/L (ref 0.4–1.9)
LACTATE BLDV-SCNC: 4 MMOL/L (ref 0.4–1.9)
LEUKOCYTE ESTERASE UR QL STRIP.AUTO: NEGATIVE
LYMPHOCYTES # BLD: 4.2 K/UL (ref 1–5.1)
LYMPHOCYTES NFR BLD: 6 %
MCH RBC QN AUTO: 26.5 PG (ref 26–34)
MCHC RBC AUTO-ENTMCNC: 32.1 G/DL (ref 31–36)
MCV RBC AUTO: 82.4 FL (ref 80–100)
METAMYELOCYTES NFR BLD MANUAL: 0 %
MONOCYTES # BLD: 3.5 K/UL (ref 0–1.3)
MONOCYTES NFR BLD: 5 %
MYELOCYTES NFR BLD MANUAL: 2 %
NEUTROPHILS # BLD: 62.6 K/UL (ref 1.7–7.7)
NEUTROPHILS NFR BLD: 86 %
NEUTS BAND NFR BLD MANUAL: 1 % (ref 0–7)
NITRITE UR QL STRIP.AUTO: NEGATIVE
NT-PROBNP SERPL-MCNC: 561 PG/ML (ref 0–124)
PATH INTERP BLD-IMP: NO
PH UR STRIP.AUTO: 6.5 [PH] (ref 5–8)
PLATELET # BLD AUTO: 403 K/UL (ref 135–450)
PLATELET BLD QL SMEAR: ABNORMAL
PMV BLD AUTO: 7.5 FL (ref 5–10.5)
POLYCHROMASIA BLD QL SMEAR: ABNORMAL
POTASSIUM SERPL-SCNC: 4.1 MMOL/L (ref 3.5–5.1)
POTASSIUM SERPL-SCNC: 4.4 MMOL/L (ref 3.5–5.1)
PROCALCITONIN SERPL IA-MCNC: 1.13 NG/ML (ref 0–0.15)
PROT SERPL-MCNC: 5.3 G/DL (ref 6.4–8.2)
PROT UR STRIP.AUTO-MCNC: ABNORMAL MG/DL
RBC # BLD AUTO: 2.96 M/UL (ref 4.2–5.9)
RBC CLUMPS #/AREA URNS AUTO: 1 /HPF (ref 0–4)
SARS-COV-2 RNA RESP QL NAA+PROBE: NOT DETECTED
SLIDE REVIEW: ABNORMAL
SODIUM SERPL-SCNC: 122 MMOL/L (ref 136–145)
SODIUM SERPL-SCNC: 124 MMOL/L (ref 136–145)
SP GR UR STRIP.AUTO: 1.01 (ref 1–1.03)
TOXIC GRANULES BLD QL SMEAR: PRESENT
UA COMPLETE W REFLEX CULTURE PNL UR: ABNORMAL
UA DIPSTICK W REFLEX MICRO PNL UR: YES
URN SPEC COLLECT METH UR: ABNORMAL
UROBILINOGEN UR STRIP-ACNC: 1 E.U./DL
WBC # BLD AUTO: 70.3 K/UL (ref 4–11)
WBC #/AREA URNS AUTO: 0 /HPF (ref 0–5)

## 2024-02-07 PROCEDURE — 83930 ASSAY OF BLOOD OSMOLALITY: CPT

## 2024-02-07 PROCEDURE — 81001 URINALYSIS AUTO W/SCOPE: CPT

## 2024-02-07 PROCEDURE — 99285 EMERGENCY DEPT VISIT HI MDM: CPT

## 2024-02-07 PROCEDURE — 86850 RBC ANTIBODY SCREEN: CPT

## 2024-02-07 PROCEDURE — 83935 ASSAY OF URINE OSMOLALITY: CPT

## 2024-02-07 PROCEDURE — 87040 BLOOD CULTURE FOR BACTERIA: CPT

## 2024-02-07 PROCEDURE — 93005 ELECTROCARDIOGRAM TRACING: CPT | Performed by: STUDENT IN AN ORGANIZED HEALTH CARE EDUCATION/TRAINING PROGRAM

## 2024-02-07 PROCEDURE — 96367 TX/PROPH/DG ADDL SEQ IV INF: CPT

## 2024-02-07 PROCEDURE — 86923 COMPATIBILITY TEST ELECTRIC: CPT

## 2024-02-07 PROCEDURE — 36415 COLL VENOUS BLD VENIPUNCTURE: CPT

## 2024-02-07 PROCEDURE — 96366 THER/PROPH/DIAG IV INF ADDON: CPT

## 2024-02-07 PROCEDURE — 96361 HYDRATE IV INFUSION ADD-ON: CPT

## 2024-02-07 PROCEDURE — 82077 ASSAY SPEC XCP UR&BREATH IA: CPT

## 2024-02-07 PROCEDURE — 84484 ASSAY OF TROPONIN QUANT: CPT

## 2024-02-07 PROCEDURE — 86900 BLOOD TYPING SEROLOGIC ABO: CPT

## 2024-02-07 PROCEDURE — 85025 COMPLETE CBC W/AUTO DIFF WBC: CPT

## 2024-02-07 PROCEDURE — 6360000004 HC RX CONTRAST MEDICATION: Performed by: STUDENT IN AN ORGANIZED HEALTH CARE EDUCATION/TRAINING PROGRAM

## 2024-02-07 PROCEDURE — 86901 BLOOD TYPING SEROLOGIC RH(D): CPT

## 2024-02-07 PROCEDURE — 30233N1 TRANSFUSION OF NONAUTOLOGOUS RED BLOOD CELLS INTO PERIPHERAL VEIN, PERCUTANEOUS APPROACH: ICD-10-PCS | Performed by: STUDENT IN AN ORGANIZED HEALTH CARE EDUCATION/TRAINING PROGRAM

## 2024-02-07 PROCEDURE — 84300 ASSAY OF URINE SODIUM: CPT

## 2024-02-07 PROCEDURE — 84145 PROCALCITONIN (PCT): CPT

## 2024-02-07 PROCEDURE — 83605 ASSAY OF LACTIC ACID: CPT

## 2024-02-07 PROCEDURE — 87636 SARSCOV2 & INF A&B AMP PRB: CPT

## 2024-02-07 PROCEDURE — 6360000002 HC RX W HCPCS: Performed by: STUDENT IN AN ORGANIZED HEALTH CARE EDUCATION/TRAINING PROGRAM

## 2024-02-07 PROCEDURE — 2060000000 HC ICU INTERMEDIATE R&B

## 2024-02-07 PROCEDURE — 2580000003 HC RX 258: Performed by: STUDENT IN AN ORGANIZED HEALTH CARE EDUCATION/TRAINING PROGRAM

## 2024-02-07 PROCEDURE — 80053 COMPREHEN METABOLIC PANEL: CPT

## 2024-02-07 PROCEDURE — P9040 RBC LEUKOREDUCED IRRADIATED: HCPCS

## 2024-02-07 PROCEDURE — 83880 ASSAY OF NATRIURETIC PEPTIDE: CPT

## 2024-02-07 PROCEDURE — 70450 CT HEAD/BRAIN W/O DYE: CPT

## 2024-02-07 PROCEDURE — 71045 X-RAY EXAM CHEST 1 VIEW: CPT

## 2024-02-07 PROCEDURE — 71260 CT THORAX DX C+: CPT

## 2024-02-07 PROCEDURE — 96365 THER/PROPH/DIAG IV INF INIT: CPT

## 2024-02-07 RX ORDER — 0.9 % SODIUM CHLORIDE 0.9 %
30 INTRAVENOUS SOLUTION INTRAVENOUS ONCE
Status: COMPLETED | OUTPATIENT
Start: 2024-02-07 | End: 2024-02-07

## 2024-02-07 RX ADMIN — IOPAMIDOL 75 ML: 755 INJECTION, SOLUTION INTRAVENOUS at 22:49

## 2024-02-07 RX ADMIN — CEFEPIME HYDROCHLORIDE 2000 MG: 2 INJECTION, POWDER, FOR SOLUTION INTRAVENOUS at 18:31

## 2024-02-07 RX ADMIN — SODIUM CHLORIDE 1974 ML: 9 INJECTION, SOLUTION INTRAVENOUS at 17:14

## 2024-02-07 RX ADMIN — VANCOMYCIN HYDROCHLORIDE 1500 MG: 1.5 INJECTION, POWDER, LYOPHILIZED, FOR SOLUTION INTRAVENOUS at 19:28

## 2024-02-07 NOTE — ED PROVIDER NOTES
[facility] EMERGENCY DEPARTMENT  EMERGENCY DEPARTMENT ENCOUNTER      Pt Name: Sarabjit Novoa  MRN: [id number]  Birthdate 1965  Date of evaluation: 2/7/2024  Provider: Abhishek Yap MD    CHIEF COMPLAINT       Chief Complaint   Patient presents with    Altered Mental Status     Pt via EMS from home, wife called due to hallucinations and being altered, pt started chemo last week for lung cancer with mets to the bone, temp 100.5, had pneumonia recently with an admission, oxygen 89 on home 1 L         HISTORY OF PRESENT ILLNESS   (Location/Symptom, Timing/Onset, Context/Setting, Quality, Duration, Modifying Factors, Severity)  Note limiting factors.   Sarabjit Novoa is a 58 y.o. male who presents to the emergency department with apparent hallucinations and confusion.  Wife called EMS today.  The patient is denying this, states that he is feeling well.  He is chronically short of breath, uses 1 L nasal cannula oxygen at baseline.  He recalls having recent pneumonia but states that he completed IV antibiotics for this.  He denies any productive cough or fever.  He denies any abdominal pain.  He just had chemotherapy last Tuesday.  He does not have a port.        Chart reviewed: Admission in January 2024 reviewed.  \"Sepsis secondary to pna treated with course of iv atbx  Metastatic poorly differentiated non-small cell carcinoma of the lung to the bone and lymph nodes right upper lobe mass with multiple pulmonary nodules as well as direct rib invasion  Started chemotherapy, follows with Dr. Laws.\"  Nursing Notes were reviewed.    REVIEW OF SYSTEMS    (2-9 systems for level 4, 10 or more for level 5)     Review of Systems    Except as noted above the remainder of the review of systems was reviewed and negative.       PAST MEDICAL HISTORY     Past Medical History:   Diagnosis Date    Arthritis     Cancer (HCC)     Hyperlipidemia     Hypertension          SURGICAL HISTORY       Past Surgical History:

## 2024-02-08 PROBLEM — D64.9 ANEMIA: Status: ACTIVE | Noted: 2024-02-08

## 2024-02-08 PROBLEM — A41.9 SEPSIS (HCC): Status: ACTIVE | Noted: 2024-02-08

## 2024-02-08 PROBLEM — D84.9 IMMUNOCOMPROMISED (HCC): Status: ACTIVE | Noted: 2024-02-08

## 2024-02-08 PROBLEM — E87.20 LACTIC ACIDOSIS: Status: ACTIVE | Noted: 2024-02-08

## 2024-02-08 PROBLEM — Z87.891 EX-HEAVY CIGARETTE SMOKER (20-39 PER DAY): Status: ACTIVE | Noted: 2024-02-08

## 2024-02-08 PROBLEM — R91.8 LUNG MASS: Status: ACTIVE | Noted: 2024-02-08

## 2024-02-08 PROBLEM — Z79.899 ON ANTINEOPLASTIC CHEMOTHERAPY: Status: ACTIVE | Noted: 2024-02-08

## 2024-02-08 PROBLEM — R79.89 ELEVATED BRAIN NATRIURETIC PEPTIDE (BNP) LEVEL: Status: ACTIVE | Noted: 2024-02-08

## 2024-02-08 PROBLEM — G93.41 ACUTE METABOLIC ENCEPHALOPATHY: Status: ACTIVE | Noted: 2024-02-08

## 2024-02-08 PROBLEM — E87.1 HYPONATREMIA: Status: ACTIVE | Noted: 2024-02-08

## 2024-02-08 PROBLEM — R79.89 ELEVATED PROCALCITONIN: Status: ACTIVE | Noted: 2024-02-08

## 2024-02-08 LAB
ABO + RH BLD: NORMAL
AMMONIA PLAS-SCNC: 32 UMOL/L (ref 16–60)
ANION GAP SERPL CALCULATED.3IONS-SCNC: 11 MMOL/L (ref 3–16)
ANION GAP SERPL CALCULATED.3IONS-SCNC: 12 MMOL/L (ref 3–16)
ANION GAP SERPL CALCULATED.3IONS-SCNC: 13 MMOL/L (ref 3–16)
ANION GAP SERPL CALCULATED.3IONS-SCNC: 13 MMOL/L (ref 3–16)
ANION GAP SERPL CALCULATED.3IONS-SCNC: 14 MMOL/L (ref 3–16)
BASE EXCESS BLDV CALC-SCNC: 0.2 MMOL/L (ref -3–3)
BASOPHILS # BLD: 0 K/UL (ref 0–0.2)
BASOPHILS NFR BLD: 0 %
BLD GP AB SCN SERPL QL: NORMAL
BUN SERPL-MCNC: 10 MG/DL (ref 7–20)
BUN SERPL-MCNC: 11 MG/DL (ref 7–20)
BUN SERPL-MCNC: 12 MG/DL (ref 7–20)
CALCIUM SERPL-MCNC: 10.1 MG/DL (ref 8.3–10.6)
CALCIUM SERPL-MCNC: 9.2 MG/DL (ref 8.3–10.6)
CALCIUM SERPL-MCNC: 9.3 MG/DL (ref 8.3–10.6)
CALCIUM SERPL-MCNC: 9.4 MG/DL (ref 8.3–10.6)
CALCIUM SERPL-MCNC: 9.4 MG/DL (ref 8.3–10.6)
CHLORIDE SERPL-SCNC: 94 MMOL/L (ref 99–110)
CHLORIDE SERPL-SCNC: 95 MMOL/L (ref 99–110)
CO2 BLDV-SCNC: 59 MMOL/L
CO2 SERPL-SCNC: 18 MMOL/L (ref 21–32)
CO2 SERPL-SCNC: 20 MMOL/L (ref 21–32)
CO2 SERPL-SCNC: 21 MMOL/L (ref 21–32)
CO2 SERPL-SCNC: 22 MMOL/L (ref 21–32)
CO2 SERPL-SCNC: 23 MMOL/L (ref 21–32)
COHGB MFR BLDV: 3.3 % (ref 0–1.5)
CREAT SERPL-MCNC: 0.5 MG/DL (ref 0.8–1.3)
CREAT SERPL-MCNC: 0.6 MG/DL (ref 0.8–1.3)
CREAT SERPL-MCNC: <0.5 MG/DL (ref 0.8–1.3)
DEPRECATED RDW RBC AUTO: 15.5 % (ref 12.4–15.4)
DO-HGB MFR BLDV: 15 %
EKG ATRIAL RATE: 107 BPM
EKG DIAGNOSIS: NORMAL
EKG P AXIS: 49 DEGREES
EKG P-R INTERVAL: 122 MS
EKG Q-T INTERVAL: 336 MS
EKG QRS DURATION: 80 MS
EKG QTC CALCULATION (BAZETT): 448 MS
EKG R AXIS: 51 DEGREES
EKG T AXIS: 67 DEGREES
EKG VENTRICULAR RATE: 107 BPM
EOSINOPHIL # BLD: 0 K/UL (ref 0–0.6)
EOSINOPHIL NFR BLD: 0 %
GFR SERPLBLD CREATININE-BSD FMLA CKD-EPI: >60 ML/MIN/{1.73_M2}
GLUCOSE SERPL-MCNC: 104 MG/DL (ref 70–99)
GLUCOSE SERPL-MCNC: 108 MG/DL (ref 70–99)
GLUCOSE SERPL-MCNC: 109 MG/DL (ref 70–99)
GLUCOSE SERPL-MCNC: 117 MG/DL (ref 70–99)
GLUCOSE SERPL-MCNC: 128 MG/DL (ref 70–99)
HCO3 BLDV-SCNC: 25 MMOL/L (ref 23–29)
HCT VFR BLD AUTO: 27.9 % (ref 40.5–52.5)
HGB BLD-MCNC: 8.6 G/DL (ref 13.5–17.5)
LACTATE BLDV-SCNC: 3.6 MMOL/L (ref 0.4–2)
LEGIONELLA AG UR QL: NORMAL
LYMPHOCYTES # BLD: 2.3 K/UL (ref 1–5.1)
LYMPHOCYTES NFR BLD: 3 %
MCH RBC QN AUTO: 25.8 PG (ref 26–34)
MCHC RBC AUTO-ENTMCNC: 30.8 G/DL (ref 31–36)
MCV RBC AUTO: 83.6 FL (ref 80–100)
METAMYELOCYTES NFR BLD MANUAL: 1 %
METHGB MFR BLDV: 0.6 %
MONOCYTES # BLD: 2.3 K/UL (ref 0–1.3)
MONOCYTES NFR BLD: 3 %
MYELOCYTES NFR BLD MANUAL: 1 %
NEUTROPHILS # BLD: 72.8 K/UL (ref 1.7–7.7)
NEUTROPHILS NFR BLD: 90 %
NEUTS BAND NFR BLD MANUAL: 2 % (ref 0–7)
O2 CT VFR BLDV CALC: 10 VOL %
O2 THERAPY: ABNORMAL
OSMOLALITY SERPL: 271 MOSM/KG (ref 280–301)
OSMOLALITY UR: 355 MOSM/KG (ref 390–1070)
PCO2 BLDV: 40 MMHG (ref 40–50)
PH BLDV: 7.4 [PH] (ref 7.35–7.45)
PLATELET # BLD AUTO: 445 K/UL (ref 135–450)
PLATELET BLD QL SMEAR: ADEQUATE
PMV BLD AUTO: 7.3 FL (ref 5–10.5)
PO2 BLDV: 51.6 MMHG (ref 25–40)
POLYCHROMASIA BLD QL SMEAR: ABNORMAL
POTASSIUM SERPL-SCNC: 4 MMOL/L (ref 3.5–5.1)
POTASSIUM SERPL-SCNC: 4.1 MMOL/L (ref 3.5–5.1)
POTASSIUM SERPL-SCNC: 4.3 MMOL/L (ref 3.5–5.1)
POTASSIUM SERPL-SCNC: 4.3 MMOL/L (ref 3.5–5.1)
POTASSIUM SERPL-SCNC: 5.1 MMOL/L (ref 3.5–5.1)
PROCALCITONIN SERPL IA-MCNC: 1.02 NG/ML (ref 0–0.15)
RBC # BLD AUTO: 3.34 M/UL (ref 4.2–5.9)
S PNEUM AG UR QL: NORMAL
SAO2 % BLDV: 85 %
SLIDE REVIEW: ABNORMAL
SODIUM SERPL-SCNC: 125 MMOL/L (ref 136–145)
SODIUM SERPL-SCNC: 127 MMOL/L (ref 136–145)
SODIUM SERPL-SCNC: 127 MMOL/L (ref 136–145)
SODIUM SERPL-SCNC: 128 MMOL/L (ref 136–145)
SODIUM SERPL-SCNC: 131 MMOL/L (ref 136–145)
SODIUM UR-SCNC: 28 MMOL/L
TOXIC GRANULES BLD QL SMEAR: PRESENT
TROPONIN, HIGH SENSITIVITY: 17 NG/L (ref 0–22)
TROPONIN, HIGH SENSITIVITY: 19 NG/L (ref 0–22)
WBC # BLD AUTO: 77.4 K/UL (ref 4–11)

## 2024-02-08 PROCEDURE — 2060000000 HC ICU INTERMEDIATE R&B

## 2024-02-08 PROCEDURE — 6370000000 HC RX 637 (ALT 250 FOR IP): Performed by: PHYSICIAN ASSISTANT

## 2024-02-08 PROCEDURE — 82140 ASSAY OF AMMONIA: CPT

## 2024-02-08 PROCEDURE — 2580000003 HC RX 258: Performed by: PHYSICIAN ASSISTANT

## 2024-02-08 PROCEDURE — 2580000003 HC RX 258: Performed by: INTERNAL MEDICINE

## 2024-02-08 PROCEDURE — 99223 1ST HOSP IP/OBS HIGH 75: CPT | Performed by: INTERNAL MEDICINE

## 2024-02-08 PROCEDURE — 36415 COLL VENOUS BLD VENIPUNCTURE: CPT

## 2024-02-08 PROCEDURE — 87449 NOS EACH ORGANISM AG IA: CPT

## 2024-02-08 PROCEDURE — 6360000002 HC RX W HCPCS: Performed by: PHYSICIAN ASSISTANT

## 2024-02-08 PROCEDURE — 6370000000 HC RX 637 (ALT 250 FOR IP): Performed by: INTERNAL MEDICINE

## 2024-02-08 PROCEDURE — 6360000002 HC RX W HCPCS: Performed by: INTERNAL MEDICINE

## 2024-02-08 PROCEDURE — 84145 PROCALCITONIN (PCT): CPT

## 2024-02-08 PROCEDURE — 94761 N-INVAS EAR/PLS OXIMETRY MLT: CPT

## 2024-02-08 PROCEDURE — 80048 BASIC METABOLIC PNL TOTAL CA: CPT

## 2024-02-08 PROCEDURE — 2700000000 HC OXYGEN THERAPY PER DAY

## 2024-02-08 PROCEDURE — 83605 ASSAY OF LACTIC ACID: CPT

## 2024-02-08 PROCEDURE — 82803 BLOOD GASES ANY COMBINATION: CPT

## 2024-02-08 PROCEDURE — 6370000000 HC RX 637 (ALT 250 FOR IP): Performed by: CLINICAL NURSE SPECIALIST

## 2024-02-08 PROCEDURE — 93010 ELECTROCARDIOGRAM REPORT: CPT | Performed by: INTERNAL MEDICINE

## 2024-02-08 PROCEDURE — 84484 ASSAY OF TROPONIN QUANT: CPT

## 2024-02-08 PROCEDURE — 6370000000 HC RX 637 (ALT 250 FOR IP): Performed by: STUDENT IN AN ORGANIZED HEALTH CARE EDUCATION/TRAINING PROGRAM

## 2024-02-08 PROCEDURE — 85025 COMPLETE CBC W/AUTO DIFF WBC: CPT

## 2024-02-08 RX ORDER — OXYCODONE HYDROCHLORIDE 5 MG/1
5 TABLET ORAL EVERY 4 HOURS PRN
Status: DISCONTINUED | OUTPATIENT
Start: 2024-02-08 | End: 2024-02-08

## 2024-02-08 RX ORDER — ONDANSETRON 2 MG/ML
4 INJECTION INTRAMUSCULAR; INTRAVENOUS EVERY 6 HOURS PRN
Status: DISCONTINUED | OUTPATIENT
Start: 2024-02-08 | End: 2024-02-13 | Stop reason: HOSPADM

## 2024-02-08 RX ORDER — SENNOSIDES A AND B 8.6 MG/1
1 TABLET, FILM COATED ORAL DAILY PRN
Status: DISCONTINUED | OUTPATIENT
Start: 2024-02-08 | End: 2024-02-08

## 2024-02-08 RX ORDER — ALBUTEROL SULFATE 2.5 MG/3ML
2.5 SOLUTION RESPIRATORY (INHALATION)
Status: DISCONTINUED | OUTPATIENT
Start: 2024-02-08 | End: 2024-02-13 | Stop reason: HOSPADM

## 2024-02-08 RX ORDER — SODIUM CHLORIDE 0.9 % (FLUSH) 0.9 %
5-40 SYRINGE (ML) INJECTION EVERY 12 HOURS SCHEDULED
Status: DISCONTINUED | OUTPATIENT
Start: 2024-02-08 | End: 2024-02-13 | Stop reason: HOSPADM

## 2024-02-08 RX ORDER — SENNA AND DOCUSATE SODIUM 50; 8.6 MG/1; MG/1
1 TABLET, FILM COATED ORAL DAILY
Status: DISCONTINUED | OUTPATIENT
Start: 2024-02-08 | End: 2024-02-13 | Stop reason: HOSPADM

## 2024-02-08 RX ORDER — ACETAMINOPHEN 325 MG/1
650 TABLET ORAL EVERY 6 HOURS PRN
Status: DISCONTINUED | OUTPATIENT
Start: 2024-02-08 | End: 2024-02-13 | Stop reason: HOSPADM

## 2024-02-08 RX ORDER — DEXTROSE MONOHYDRATE 50 MG/ML
INJECTION, SOLUTION INTRAVENOUS CONTINUOUS
Status: DISCONTINUED | OUTPATIENT
Start: 2024-02-08 | End: 2024-02-08

## 2024-02-08 RX ORDER — OXYCODONE HYDROCHLORIDE 5 MG/1
5 TABLET ORAL EVERY 4 HOURS
Status: DISCONTINUED | OUTPATIENT
Start: 2024-02-08 | End: 2024-02-13 | Stop reason: HOSPADM

## 2024-02-08 RX ORDER — BISACODYL 5 MG/1
5 TABLET, DELAYED RELEASE ORAL DAILY PRN
Status: DISCONTINUED | OUTPATIENT
Start: 2024-02-08 | End: 2024-02-13 | Stop reason: HOSPADM

## 2024-02-08 RX ORDER — HYDROMORPHONE HYDROCHLORIDE 1 MG/ML
0.5 INJECTION, SOLUTION INTRAMUSCULAR; INTRAVENOUS; SUBCUTANEOUS
Status: DISCONTINUED | OUTPATIENT
Start: 2024-02-08 | End: 2024-02-13 | Stop reason: HOSPADM

## 2024-02-08 RX ORDER — ONDANSETRON HYDROCHLORIDE 8 MG/1
8 TABLET, FILM COATED ORAL EVERY 8 HOURS PRN
Status: DISCONTINUED | OUTPATIENT
Start: 2024-02-08 | End: 2024-02-08

## 2024-02-08 RX ORDER — OXYCODONE HYDROCHLORIDE AND ACETAMINOPHEN 5; 325 MG/1; MG/1
1 TABLET ORAL EVERY 8 HOURS PRN
Status: DISCONTINUED | OUTPATIENT
Start: 2024-02-08 | End: 2024-02-08

## 2024-02-08 RX ORDER — MIRTAZAPINE 15 MG/1
15 TABLET, FILM COATED ORAL NIGHTLY
Status: DISCONTINUED | OUTPATIENT
Start: 2024-02-08 | End: 2024-02-13 | Stop reason: HOSPADM

## 2024-02-08 RX ORDER — IPRATROPIUM BROMIDE AND ALBUTEROL SULFATE 2.5; .5 MG/3ML; MG/3ML
1 SOLUTION RESPIRATORY (INHALATION) 2 TIMES DAILY
Status: DISCONTINUED | OUTPATIENT
Start: 2024-02-08 | End: 2024-02-08

## 2024-02-08 RX ORDER — LEVOFLOXACIN 5 MG/ML
750 INJECTION, SOLUTION INTRAVENOUS EVERY 24 HOURS
Status: DISCONTINUED | OUTPATIENT
Start: 2024-02-08 | End: 2024-02-11

## 2024-02-08 RX ORDER — MORPHINE SULFATE 15 MG/1
15 TABLET, FILM COATED, EXTENDED RELEASE ORAL 2 TIMES DAILY
Status: DISCONTINUED | OUTPATIENT
Start: 2024-02-08 | End: 2024-02-08

## 2024-02-08 RX ORDER — IPRATROPIUM BROMIDE AND ALBUTEROL SULFATE 2.5; .5 MG/3ML; MG/3ML
1 SOLUTION RESPIRATORY (INHALATION)
Status: DISCONTINUED | OUTPATIENT
Start: 2024-02-08 | End: 2024-02-08

## 2024-02-08 RX ORDER — FAMOTIDINE 20 MG/1
20 TABLET, FILM COATED ORAL 2 TIMES DAILY
Status: DISCONTINUED | OUTPATIENT
Start: 2024-02-08 | End: 2024-02-13 | Stop reason: HOSPADM

## 2024-02-08 RX ORDER — ACETAMINOPHEN 650 MG/1
650 SUPPOSITORY RECTAL EVERY 6 HOURS PRN
Status: DISCONTINUED | OUTPATIENT
Start: 2024-02-08 | End: 2024-02-13 | Stop reason: HOSPADM

## 2024-02-08 RX ORDER — SODIUM CHLORIDE 0.9 % (FLUSH) 0.9 %
10 SYRINGE (ML) INJECTION PRN
Status: DISCONTINUED | OUTPATIENT
Start: 2024-02-08 | End: 2024-02-13 | Stop reason: HOSPADM

## 2024-02-08 RX ORDER — METRONIDAZOLE 250 MG/1
500 TABLET ORAL EVERY 8 HOURS SCHEDULED
Status: DISCONTINUED | OUTPATIENT
Start: 2024-02-08 | End: 2024-02-11

## 2024-02-08 RX ORDER — ENOXAPARIN SODIUM 100 MG/ML
40 INJECTION SUBCUTANEOUS DAILY
Status: DISCONTINUED | OUTPATIENT
Start: 2024-02-08 | End: 2024-02-13

## 2024-02-08 RX ORDER — SODIUM CHLORIDE 9 MG/ML
INJECTION, SOLUTION INTRAVENOUS PRN
Status: DISCONTINUED | OUTPATIENT
Start: 2024-02-08 | End: 2024-02-13 | Stop reason: HOSPADM

## 2024-02-08 RX ORDER — LACTOBACILLUS RHAMNOSUS GG 10B CELL
1 CAPSULE ORAL 2 TIMES DAILY WITH MEALS
Status: DISCONTINUED | OUTPATIENT
Start: 2024-02-08 | End: 2024-02-13 | Stop reason: HOSPADM

## 2024-02-08 RX ADMIN — FAMOTIDINE 20 MG: 20 TABLET ORAL at 20:15

## 2024-02-08 RX ADMIN — OXYCODONE HYDROCHLORIDE 5 MG: 5 TABLET ORAL at 12:07

## 2024-02-08 RX ADMIN — OXYCODONE HYDROCHLORIDE 5 MG: 5 TABLET ORAL at 23:59

## 2024-02-08 RX ADMIN — VANCOMYCIN HYDROCHLORIDE 1750 MG: 10 INJECTION, POWDER, LYOPHILIZED, FOR SOLUTION INTRAVENOUS at 20:14

## 2024-02-08 RX ADMIN — FAMOTIDINE 20 MG: 20 TABLET ORAL at 00:51

## 2024-02-08 RX ADMIN — VANCOMYCIN HYDROCHLORIDE 1750 MG: 10 INJECTION, POWDER, LYOPHILIZED, FOR SOLUTION INTRAVENOUS at 08:51

## 2024-02-08 RX ADMIN — Medication 1 CAPSULE: at 08:40

## 2024-02-08 RX ADMIN — Medication 10 ML: at 08:40

## 2024-02-08 RX ADMIN — DEXTROSE MONOHYDRATE: 50 INJECTION, SOLUTION INTRAVENOUS at 09:48

## 2024-02-08 RX ADMIN — METRONIDAZOLE 500 MG: 250 TABLET ORAL at 22:17

## 2024-02-08 RX ADMIN — ENOXAPARIN SODIUM 40 MG: 100 INJECTION SUBCUTANEOUS at 08:40

## 2024-02-08 RX ADMIN — Medication 1 CAPSULE: at 16:13

## 2024-02-08 RX ADMIN — OXYCODONE HYDROCHLORIDE 5 MG: 5 TABLET ORAL at 16:13

## 2024-02-08 RX ADMIN — OXYCODONE HYDROCHLORIDE AND ACETAMINOPHEN 1 TABLET: 5; 325 TABLET ORAL at 00:51

## 2024-02-08 RX ADMIN — OXYCODONE HYDROCHLORIDE 5 MG: 5 TABLET ORAL at 20:15

## 2024-02-08 RX ADMIN — Medication 10 ML: at 20:15

## 2024-02-08 RX ADMIN — METRONIDAZOLE 500 MG: 250 TABLET ORAL at 13:55

## 2024-02-08 RX ADMIN — CEFEPIME 2000 MG: 2 INJECTION, POWDER, FOR SOLUTION INTRAVENOUS at 02:09

## 2024-02-08 RX ADMIN — FAMOTIDINE 20 MG: 20 TABLET ORAL at 08:40

## 2024-02-08 RX ADMIN — CEFEPIME 2000 MG: 2 INJECTION, POWDER, FOR SOLUTION INTRAVENOUS at 10:55

## 2024-02-08 RX ADMIN — MIRTAZAPINE 15 MG: 15 TABLET, FILM COATED ORAL at 20:15

## 2024-02-08 RX ADMIN — LEVOFLOXACIN 750 MG: 5 INJECTION, SOLUTION INTRAVENOUS at 14:02

## 2024-02-08 NOTE — H&P
1 02/07/2024 08:50 PM    BLOODU Negative 02/07/2024 08:50 PM    SPECGRAV 1.015 02/07/2024 08:50 PM    GLUCOSEU Negative 02/07/2024 08:50 PM       Radiology:     CXR: I have reviewed the CXR with the following interpretation:   Right upper lobe mass  EKG:  I have reviewed the EKG with the following interpretation:     Sinus tachycardia QTc 448  .  CT CHEST PULMONARY EMBOLISM W CONTRAST   Final Result   1. No evidence of central pulmonary embolism in the main pulmonary arteries.   The peripheral vessels are not adequately opacified for evaluation.   2. Multiple lung masses and nodules, the largest in the right upper lobe   measures 8.4 x 9 cm, with areas of necrosis, this extends beyond the lung to   the chest wall posteriorly with destruction of the ribs into the soft tissue,   overall not significantly changed, considering different measurement   technique. There are additional innumerable pulmonary nodules bilaterally as   seen in the prior exam.   3. Mediastinal and hilar lymphadenopathy.   4. Multiple masses in the right lower neck, the largest is posteriorly   measuring 12 cm.   5. Bilateral adrenal nodules are again seen.   6. 2.7 cm hypodense lesion in the left hepatic lobe.   7. Small right pleural effusion.         CT Head W/O Contrast   Final Result   1. No acute intracranial abnormality.   2. 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         XR CHEST PORTABLE   Final Result   Stable exam with known right upper lobe lung mass and scattered pulmonary   nodules and small right pleural effusion                      Anthony Villagomez DO

## 2024-02-08 NOTE — PLAN OF CARE
Problem: Discharge Planning  Goal: Discharge to home or other facility with appropriate resources  2/8/2024 1028 by Kelly Flores RN  Outcome: Progressing  2/8/2024 0356 by Guerda Sood RN  Outcome: Progressing     Problem: Pain  Goal: Verbalizes/displays adequate comfort level or baseline comfort level  2/8/2024 1028 by Kelly Flores RN  Outcome: Progressing  2/8/2024 0356 by Guerda Sood RN  Outcome: Progressing     Problem: Safety - Adult  Goal: Free from fall injury  2/8/2024 1028 by Kelly Flores RN  Outcome: Progressing  2/8/2024 0356 by Guerda Sood RN  Outcome: Progressing     Problem: ABCDS Injury Assessment  Goal: Absence of physical injury  2/8/2024 1028 by Kelly Flores RN  Outcome: Progressing  2/8/2024 0356 by Guerda Sood RN  Outcome: Progressing

## 2024-02-08 NOTE — ED NOTES
Forearm (Active)     PO Status: Regular  Pertinent or High Risk Medications/Drips: no   If Yes, please provide details: none  Pending Blood Product Administration: no       You may also review the ED PT Care Timeline found under the Summary Nursing Index tab.    Recommendation    Pending orders admission orders  Plan for Discharge (if known):   Additional Comments: periods of confusion and hallucinations   If any further questions, please call Sending RN at [phone]    Electronically signed by: Electronically signed by Boby Lemus RN on 2/7/2024 at 11:46 PM

## 2024-02-09 LAB
ALBUMIN SERPL-MCNC: 2 G/DL (ref 3.4–5)
ANION GAP SERPL CALCULATED.3IONS-SCNC: 10 MMOL/L (ref 3–16)
ANISOCYTOSIS BLD QL SMEAR: ABNORMAL
BASOPHILS # BLD: 0 K/UL (ref 0–0.2)
BASOPHILS NFR BLD: 0 %
BUN SERPL-MCNC: 10 MG/DL (ref 7–20)
CALCIUM SERPL-MCNC: 9.6 MG/DL (ref 8.3–10.6)
CHLORIDE SERPL-SCNC: 96 MMOL/L (ref 99–110)
CO2 SERPL-SCNC: 22 MMOL/L (ref 21–32)
CREAT SERPL-MCNC: <0.5 MG/DL (ref 0.8–1.3)
DEPRECATED RDW RBC AUTO: 14.5 % (ref 12.4–15.4)
EOSINOPHIL # BLD: 0 K/UL (ref 0–0.6)
EOSINOPHIL NFR BLD: 0 %
GFR SERPLBLD CREATININE-BSD FMLA CKD-EPI: >60 ML/MIN/{1.73_M2}
GLUCOSE SERPL-MCNC: 111 MG/DL (ref 70–99)
HCT VFR BLD AUTO: 22.4 % (ref 40.5–52.5)
HGB BLD-MCNC: 7.2 G/DL (ref 13.5–17.5)
LACTATE BLDV-SCNC: 1.9 MMOL/L (ref 0.4–2)
LYMPHOCYTES # BLD: 2.3 K/UL (ref 1–5.1)
LYMPHOCYTES NFR BLD: 3 %
MCH RBC QN AUTO: 26.5 PG (ref 26–34)
MCHC RBC AUTO-ENTMCNC: 32 G/DL (ref 31–36)
MCV RBC AUTO: 82.7 FL (ref 80–100)
METAMYELOCYTES NFR BLD MANUAL: 1 %
MONOCYTES # BLD: 3.9 K/UL (ref 0–1.3)
MONOCYTES NFR BLD: 5 %
NEUTROPHILS # BLD: 70.9 K/UL (ref 1.7–7.7)
NEUTROPHILS NFR BLD: 88 %
NEUTS BAND NFR BLD MANUAL: 3 % (ref 0–7)
PATH INTERP BLD-IMP: NO
PHOSPHATE SERPL-MCNC: 3.5 MG/DL (ref 2.5–4.9)
PLATELET # BLD AUTO: 366 K/UL (ref 135–450)
PLATELET BLD QL SMEAR: ADEQUATE
PMV BLD AUTO: 6.7 FL (ref 5–10.5)
POTASSIUM SERPL-SCNC: 4 MMOL/L (ref 3.5–5.1)
RBC # BLD AUTO: 2.7 M/UL (ref 4.2–5.9)
SLIDE REVIEW: ABNORMAL
SODIUM SERPL-SCNC: 126 MMOL/L (ref 136–145)
SODIUM SERPL-SCNC: 128 MMOL/L (ref 136–145)
SODIUM SERPL-SCNC: 128 MMOL/L (ref 136–145)
TOXIC GRANULES BLD QL SMEAR: PRESENT
VANCOMYCIN SERPL-MCNC: 13.6 UG/ML
WBC # BLD AUTO: 77.1 K/UL (ref 4–11)

## 2024-02-09 PROCEDURE — 6370000000 HC RX 637 (ALT 250 FOR IP): Performed by: PHYSICIAN ASSISTANT

## 2024-02-09 PROCEDURE — 2580000003 HC RX 258: Performed by: INTERNAL MEDICINE

## 2024-02-09 PROCEDURE — 85025 COMPLETE CBC W/AUTO DIFF WBC: CPT

## 2024-02-09 PROCEDURE — 2060000000 HC ICU INTERMEDIATE R&B

## 2024-02-09 PROCEDURE — 83605 ASSAY OF LACTIC ACID: CPT

## 2024-02-09 PROCEDURE — 6370000000 HC RX 637 (ALT 250 FOR IP): Performed by: STUDENT IN AN ORGANIZED HEALTH CARE EDUCATION/TRAINING PROGRAM

## 2024-02-09 PROCEDURE — 6370000000 HC RX 637 (ALT 250 FOR IP): Performed by: INTERNAL MEDICINE

## 2024-02-09 PROCEDURE — 6370000000 HC RX 637 (ALT 250 FOR IP): Performed by: CLINICAL NURSE SPECIALIST

## 2024-02-09 PROCEDURE — 99233 SBSQ HOSP IP/OBS HIGH 50: CPT | Performed by: INTERNAL MEDICINE

## 2024-02-09 PROCEDURE — 6360000002 HC RX W HCPCS: Performed by: PHYSICIAN ASSISTANT

## 2024-02-09 PROCEDURE — 84295 ASSAY OF SERUM SODIUM: CPT

## 2024-02-09 PROCEDURE — 36415 COLL VENOUS BLD VENIPUNCTURE: CPT

## 2024-02-09 PROCEDURE — 80202 ASSAY OF VANCOMYCIN: CPT

## 2024-02-09 PROCEDURE — 6360000002 HC RX W HCPCS: Performed by: INTERNAL MEDICINE

## 2024-02-09 PROCEDURE — 2580000003 HC RX 258: Performed by: PHYSICIAN ASSISTANT

## 2024-02-09 PROCEDURE — 80069 RENAL FUNCTION PANEL: CPT

## 2024-02-09 PROCEDURE — 6360000002 HC RX W HCPCS: Performed by: STUDENT IN AN ORGANIZED HEALTH CARE EDUCATION/TRAINING PROGRAM

## 2024-02-09 RX ADMIN — Medication 10 ML: at 20:57

## 2024-02-09 RX ADMIN — SODIUM CHLORIDE 25 ML: 9 INJECTION, SOLUTION INTRAVENOUS at 08:41

## 2024-02-09 RX ADMIN — LEVOFLOXACIN 750 MG: 5 INJECTION, SOLUTION INTRAVENOUS at 14:21

## 2024-02-09 RX ADMIN — METRONIDAZOLE 500 MG: 250 TABLET ORAL at 14:22

## 2024-02-09 RX ADMIN — VANCOMYCIN HYDROCHLORIDE 1000 MG: 1 INJECTION, POWDER, LYOPHILIZED, FOR SOLUTION INTRAVENOUS at 08:42

## 2024-02-09 RX ADMIN — METRONIDAZOLE 500 MG: 250 TABLET ORAL at 05:35

## 2024-02-09 RX ADMIN — STANDARDIZED SENNA CONCENTRATE AND DOCUSATE SODIUM 1 TABLET: 8.6; 5 TABLET ORAL at 08:29

## 2024-02-09 RX ADMIN — HYDROMORPHONE HYDROCHLORIDE 0.5 MG: 1 INJECTION, SOLUTION INTRAMUSCULAR; INTRAVENOUS; SUBCUTANEOUS at 01:07

## 2024-02-09 RX ADMIN — ENOXAPARIN SODIUM 40 MG: 100 INJECTION SUBCUTANEOUS at 08:28

## 2024-02-09 RX ADMIN — MIRTAZAPINE 15 MG: 15 TABLET, FILM COATED ORAL at 20:56

## 2024-02-09 RX ADMIN — OXYCODONE HYDROCHLORIDE 5 MG: 5 TABLET ORAL at 20:56

## 2024-02-09 RX ADMIN — OXYCODONE HYDROCHLORIDE 5 MG: 5 TABLET ORAL at 12:48

## 2024-02-09 RX ADMIN — OXYCODONE HYDROCHLORIDE 5 MG: 5 TABLET ORAL at 08:29

## 2024-02-09 RX ADMIN — Medication 10 ML: at 08:30

## 2024-02-09 RX ADMIN — Medication 1 CAPSULE: at 17:22

## 2024-02-09 RX ADMIN — VANCOMYCIN HYDROCHLORIDE 1000 MG: 1 INJECTION, POWDER, LYOPHILIZED, FOR SOLUTION INTRAVENOUS at 17:22

## 2024-02-09 RX ADMIN — FAMOTIDINE 20 MG: 20 TABLET ORAL at 20:56

## 2024-02-09 RX ADMIN — FAMOTIDINE 20 MG: 20 TABLET ORAL at 08:29

## 2024-02-09 RX ADMIN — Medication 1 CAPSULE: at 08:29

## 2024-02-09 RX ADMIN — METRONIDAZOLE 500 MG: 250 TABLET ORAL at 21:50

## 2024-02-09 NOTE — CARE COORDINATION
Discharge Planning:     (CM) rec'd a call from Maggie Granados with [facility] that they are active with this Patient for PT/OT/Nursing.       Electronically signed by KAREN Brewster on 2/9/2024 at 2:19 PM

## 2024-02-09 NOTE — PLAN OF CARE
Problem: Discharge Planning  Goal: Discharge to home or other facility with appropriate resources  Outcome: Progressing     Problem: Pain  Goal: Verbalizes/displays adequate comfort level or baseline comfort level  Outcome: Progressing     Problem: Safety - Adult  Goal: Free from fall injury  Outcome: Progressing     Problem: ABCDS Injury Assessment  Goal: Absence of physical injury  Outcome: Progressing     Problem: Decision Making  Goal: Pt/Family able to effectively weigh alternatives and participate in decision making related to treatment and care  Description: INTERVENTIONS:  1. Determine when there are differences between patient's view, family's view, and healthcare provider's view of condition  2. Facilitate patient and family articulation of goals for care  3. Help patient and family identify pros/cons of alternative solutions  4. Provide information as requested by patient/family  5. Respect patient/family right to receive or not to receive information  6. Serve as a liaison between patient and family and health care team  7. Initiate Consults from Ethics, Palliative Care or initiate Family Care Conference as is appropriate  Outcome: Progressing     Problem: Respiratory - Adult  Goal: Achieves optimal ventilation and oxygenation  Outcome: Progressing     Problem: Nutrition Deficit:  Goal: Optimize nutritional status  Outcome: Progressing

## 2024-02-10 PROBLEM — D62 ABLA (ACUTE BLOOD LOSS ANEMIA): Status: ACTIVE | Noted: 2024-02-10

## 2024-02-10 LAB
ALBUMIN SERPL-MCNC: 2.1 G/DL (ref 3.4–5)
ANION GAP SERPL CALCULATED.3IONS-SCNC: 11 MMOL/L (ref 3–16)
BLOOD BANK DISPENSE STATUS: NORMAL
BLOOD BANK PRODUCT CODE: NORMAL
BPU ID: NORMAL
BUN SERPL-MCNC: 14 MG/DL (ref 7–20)
CALCIUM SERPL-MCNC: 9.8 MG/DL (ref 8.3–10.6)
CHLORIDE SERPL-SCNC: 96 MMOL/L (ref 99–110)
CO2 SERPL-SCNC: 22 MMOL/L (ref 21–32)
CREAT SERPL-MCNC: 0.5 MG/DL (ref 0.8–1.3)
DESCRIPTION BLOOD BANK: NORMAL
FERRITIN SERPL IA-MCNC: 2145 NG/ML (ref 30–400)
FOLATE SERPL-MCNC: 2.05 NG/ML (ref 4.78–24.2)
GFR SERPLBLD CREATININE-BSD FMLA CKD-EPI: >60 ML/MIN/{1.73_M2}
GLUCOSE SERPL-MCNC: 110 MG/DL (ref 70–99)
HAPTOGLOB SERPL-MCNC: 344 MG/DL (ref 30–200)
HCT VFR BLD AUTO: 26.6 % (ref 40.5–52.5)
HGB BLD-MCNC: 8 G/DL (ref 13.5–17.5)
IRON SATN MFR SERPL: 30 % (ref 20–50)
IRON SERPL-MCNC: 29 UG/DL (ref 59–158)
LACTATE BLDV-SCNC: 2.3 MMOL/L (ref 0.4–2)
LDH SERPL L TO P-CCNC: 213 U/L (ref 100–190)
PATH INTERP BLD-IMP: NORMAL
PHOSPHATE SERPL-MCNC: 2.8 MG/DL (ref 2.5–4.9)
POTASSIUM SERPL-SCNC: 4 MMOL/L (ref 3.5–5.1)
SODIUM SERPL-SCNC: 129 MMOL/L (ref 136–145)
TIBC SERPL-MCNC: 98 UG/DL (ref 260–445)
VANCOMYCIN SERPL-MCNC: 15.7 UG/ML
VIT B12 SERPL-MCNC: >2000 PG/ML (ref 211–911)

## 2024-02-10 PROCEDURE — 83605 ASSAY OF LACTIC ACID: CPT

## 2024-02-10 PROCEDURE — 6360000002 HC RX W HCPCS: Performed by: INTERNAL MEDICINE

## 2024-02-10 PROCEDURE — 82607 VITAMIN B-12: CPT

## 2024-02-10 PROCEDURE — 83010 ASSAY OF HAPTOGLOBIN QUANT: CPT

## 2024-02-10 PROCEDURE — 2060000000 HC ICU INTERMEDIATE R&B

## 2024-02-10 PROCEDURE — 80202 ASSAY OF VANCOMYCIN: CPT

## 2024-02-10 PROCEDURE — 85014 HEMATOCRIT: CPT

## 2024-02-10 PROCEDURE — 6360000002 HC RX W HCPCS: Performed by: PHYSICIAN ASSISTANT

## 2024-02-10 PROCEDURE — 6370000000 HC RX 637 (ALT 250 FOR IP): Performed by: CLINICAL NURSE SPECIALIST

## 2024-02-10 PROCEDURE — 6370000000 HC RX 637 (ALT 250 FOR IP): Performed by: PHYSICIAN ASSISTANT

## 2024-02-10 PROCEDURE — 82728 ASSAY OF FERRITIN: CPT

## 2024-02-10 PROCEDURE — 92610 EVALUATE SWALLOWING FUNCTION: CPT

## 2024-02-10 PROCEDURE — 80069 RENAL FUNCTION PANEL: CPT

## 2024-02-10 PROCEDURE — 83540 ASSAY OF IRON: CPT

## 2024-02-10 PROCEDURE — 6370000000 HC RX 637 (ALT 250 FOR IP): Performed by: STUDENT IN AN ORGANIZED HEALTH CARE EDUCATION/TRAINING PROGRAM

## 2024-02-10 PROCEDURE — 36415 COLL VENOUS BLD VENIPUNCTURE: CPT

## 2024-02-10 PROCEDURE — 85018 HEMOGLOBIN: CPT

## 2024-02-10 PROCEDURE — 36430 TRANSFUSION BLD/BLD COMPNT: CPT

## 2024-02-10 PROCEDURE — 92526 ORAL FUNCTION THERAPY: CPT

## 2024-02-10 PROCEDURE — 6370000000 HC RX 637 (ALT 250 FOR IP): Performed by: INTERNAL MEDICINE

## 2024-02-10 PROCEDURE — 83550 IRON BINDING TEST: CPT

## 2024-02-10 PROCEDURE — 2580000003 HC RX 258: Performed by: INTERNAL MEDICINE

## 2024-02-10 PROCEDURE — 83615 LACTATE (LD) (LDH) ENZYME: CPT

## 2024-02-10 PROCEDURE — 85025 COMPLETE CBC W/AUTO DIFF WBC: CPT

## 2024-02-10 PROCEDURE — 92523 SPEECH SOUND LANG COMPREHEN: CPT

## 2024-02-10 PROCEDURE — 2580000003 HC RX 258: Performed by: PHYSICIAN ASSISTANT

## 2024-02-10 PROCEDURE — 82746 ASSAY OF FOLIC ACID SERUM: CPT

## 2024-02-10 RX ORDER — SODIUM CHLORIDE 9 MG/ML
INJECTION, SOLUTION INTRAVENOUS PRN
Status: DISCONTINUED | OUTPATIENT
Start: 2024-02-10 | End: 2024-02-13 | Stop reason: HOSPADM

## 2024-02-10 RX ADMIN — ACETAMINOPHEN 650 MG: 325 TABLET ORAL at 04:09

## 2024-02-10 RX ADMIN — Medication 10 ML: at 20:44

## 2024-02-10 RX ADMIN — METRONIDAZOLE 500 MG: 250 TABLET ORAL at 22:54

## 2024-02-10 RX ADMIN — METRONIDAZOLE 500 MG: 250 TABLET ORAL at 06:03

## 2024-02-10 RX ADMIN — OXYCODONE HYDROCHLORIDE 5 MG: 5 TABLET ORAL at 20:43

## 2024-02-10 RX ADMIN — ENOXAPARIN SODIUM 40 MG: 100 INJECTION SUBCUTANEOUS at 08:55

## 2024-02-10 RX ADMIN — ACETAMINOPHEN 650 MG: 325 TABLET ORAL at 20:44

## 2024-02-10 RX ADMIN — VANCOMYCIN HYDROCHLORIDE 1000 MG: 1 INJECTION, POWDER, LYOPHILIZED, FOR SOLUTION INTRAVENOUS at 17:13

## 2024-02-10 RX ADMIN — Medication 10 ML: at 08:52

## 2024-02-10 RX ADMIN — VANCOMYCIN HYDROCHLORIDE 1000 MG: 1 INJECTION, POWDER, LYOPHILIZED, FOR SOLUTION INTRAVENOUS at 08:54

## 2024-02-10 RX ADMIN — METRONIDAZOLE 500 MG: 250 TABLET ORAL at 15:17

## 2024-02-10 RX ADMIN — Medication 1 CAPSULE: at 08:54

## 2024-02-10 RX ADMIN — Medication 1 CAPSULE: at 16:01

## 2024-02-10 RX ADMIN — LEVOFLOXACIN 750 MG: 5 INJECTION, SOLUTION INTRAVENOUS at 15:17

## 2024-02-10 RX ADMIN — OXYCODONE HYDROCHLORIDE 5 MG: 5 TABLET ORAL at 16:01

## 2024-02-10 RX ADMIN — SODIUM CHLORIDE, PRESERVATIVE FREE 10 ML: 5 INJECTION INTRAVENOUS at 00:01

## 2024-02-10 RX ADMIN — STANDARDIZED SENNA CONCENTRATE AND DOCUSATE SODIUM 1 TABLET: 8.6; 5 TABLET ORAL at 08:54

## 2024-02-10 RX ADMIN — VANCOMYCIN HYDROCHLORIDE 1000 MG: 1 INJECTION, POWDER, LYOPHILIZED, FOR SOLUTION INTRAVENOUS at 00:00

## 2024-02-10 RX ADMIN — FAMOTIDINE 20 MG: 20 TABLET ORAL at 20:43

## 2024-02-10 RX ADMIN — FAMOTIDINE 20 MG: 20 TABLET ORAL at 08:54

## 2024-02-10 RX ADMIN — MIRTAZAPINE 15 MG: 15 TABLET, FILM COATED ORAL at 20:43

## 2024-02-10 NOTE — PLAN OF CARE
Problem: Discharge Planning  Goal: Discharge to home or other facility with appropriate resources  Outcome: Progressing     Problem: Pain  Goal: Verbalizes/displays adequate comfort level or baseline comfort level  Outcome: Progressing     Problem: Safety - Adult  Goal: Free from fall injury  Outcome: Progressing     Problem: ABCDS Injury Assessment  Goal: Absence of physical injury  Outcome: Progressing     Problem: Decision Making  Goal: Pt/Family able to effectively weigh alternatives and participate in decision making related to treatment and care  Description: INTERVENTIONS:  1. Determine when there are differences between patient's view, family's view, and healthcare provider's view of condition  2. Facilitate patient and family articulation of goals for care  3. Help patient and family identify pros/cons of alternative solutions  4. Provide information as requested by patient/family  5. Respect patient/family right to receive or not to receive information  6. Serve as a liaison between patient and family and health care team  7. Initiate Consults from Ethics, Palliative Care or initiate Family Care Conference as is appropriate  Outcome: Progressing

## 2024-02-10 NOTE — SIGNIFICANT EVENT
Critical HH. Orderred anemia panel iron, ferritin, LDH, haptoglobin, B12 folate, smear.  Ordered irradiated and leukoreduced rbc.  Oncology on board, much appreciated

## 2024-02-10 NOTE — FLOWSHEET NOTE
02/10/24 0641   Treatment Team Notification   Reason for Communication Critical results   Type of Critical Result Laboratory   Critical Lab Information WBC 85 & hgb 6.8   Person Result Received From Lab   Critical Lab Result Type White blood count;Hemoglobin and hematocrit   Name of Team Member Notified Dr. Villagomez   Treatment Team Role Attending Provider   Method of Communication Secure Message   Response Waiting for response

## 2024-02-10 NOTE — PLAN OF CARE
Problem: Discharge Planning  Goal: Discharge to home or other facility with appropriate resources  2/10/2024 0908 by Ruma Fong RN  Outcome: Progressing     Problem: Pain  Goal: Verbalizes/displays adequate comfort level or baseline comfort level  2/10/2024 0908 by Ruma Fong RN  Outcome: Progressing     Problem: Safety - Adult  Goal: Free from fall injury  2/10/2024 0908 by Ruma Fong RN  Outcome: Progressing     Problem: ABCDS Injury Assessment  Goal: Absence of physical injury  2/10/2024 0908 by Ruma Fong RN  Outcome: Progressing     Problem: Decision Making  Goal: Pt/Family able to effectively weigh alternatives and participate in decision making related to treatment and care  Description: INTERVENTIONS:  1. Determine when there are differences between patient's view, family's view, and healthcare provider's view of condition  2. Facilitate patient and family articulation of goals for care  3. Help patient and family identify pros/cons of alternative solutions  4. Provide information as requested by patient/family  5. Respect patient/family right to receive or not to receive information  6. Serve as a liaison between patient and family and health care team  7. Initiate Consults from Ethics, Palliative Care or initiate Family Care Conference as is appropriate  2/10/2024 0908 by Ruma Fong RN  Outcome: Progressing     Problem: Respiratory - Adult  Goal: Achieves optimal ventilation and oxygenation  Outcome: Progressing     Problem: Nutrition Deficit:  Goal: Optimize nutritional status  Outcome: Progressing

## 2024-02-11 LAB
ALBUMIN SERPL-MCNC: 2 G/DL (ref 3.4–5)
ANION GAP SERPL CALCULATED.3IONS-SCNC: 12 MMOL/L (ref 3–16)
BACTERIA BLD CULT ORG #2: NORMAL
BACTERIA BLD CULT: NORMAL
BASOPHILS # BLD: 0 K/UL (ref 0–0.2)
BASOPHILS NFR BLD: 0 %
BUN SERPL-MCNC: 18 MG/DL (ref 7–20)
CALCIUM SERPL-MCNC: 10 MG/DL (ref 8.3–10.6)
CHLORIDE SERPL-SCNC: 95 MMOL/L (ref 99–110)
CO2 SERPL-SCNC: 23 MMOL/L (ref 21–32)
CREAT SERPL-MCNC: <0.5 MG/DL (ref 0.8–1.3)
DEPRECATED RDW RBC AUTO: 16 % (ref 12.4–15.4)
EOSINOPHIL # BLD: 0 K/UL (ref 0–0.6)
EOSINOPHIL NFR BLD: 0 %
GFR SERPLBLD CREATININE-BSD FMLA CKD-EPI: >60 ML/MIN/{1.73_M2}
GLUCOSE SERPL-MCNC: 131 MG/DL (ref 70–99)
HCT VFR BLD AUTO: 25.9 % (ref 40.5–52.5)
HGB BLD-MCNC: 8.2 G/DL (ref 13.5–17.5)
LACTATE BLDV-SCNC: 2.5 MMOL/L (ref 0.4–2)
LYMPHOCYTES # BLD: 0 K/UL (ref 1–5.1)
LYMPHOCYTES NFR BLD: 0 %
MCH RBC QN AUTO: 26.5 PG (ref 26–34)
MCHC RBC AUTO-ENTMCNC: 31.8 G/DL (ref 31–36)
MCV RBC AUTO: 83.5 FL (ref 80–100)
METAMYELOCYTES NFR BLD MANUAL: 3 %
MONOCYTES # BLD: 1.9 K/UL (ref 0–1.3)
MONOCYTES NFR BLD: 2 %
MYELOCYTES NFR BLD MANUAL: 1 %
NEUTROPHILS # BLD: 93 K/UL (ref 1.7–7.7)
NEUTROPHILS NFR BLD: 91 %
NEUTS BAND NFR BLD MANUAL: 3 % (ref 0–7)
PATH INTERP BLD-IMP: NO
PHOSPHATE SERPL-MCNC: 2.6 MG/DL (ref 2.5–4.9)
PLATELET # BLD AUTO: 281 K/UL (ref 135–450)
PLATELET BLD QL SMEAR: ADEQUATE
PMV BLD AUTO: 6.8 FL (ref 5–10.5)
POTASSIUM SERPL-SCNC: 3.6 MMOL/L (ref 3.5–5.1)
RBC # BLD AUTO: 3.1 M/UL (ref 4.2–5.9)
RBC MORPH BLD: NORMAL
SLIDE REVIEW: ABNORMAL
SODIUM SERPL-SCNC: 130 MMOL/L (ref 136–145)
WBC # BLD AUTO: 94.9 K/UL (ref 4–11)

## 2024-02-11 PROCEDURE — 6370000000 HC RX 637 (ALT 250 FOR IP): Performed by: STUDENT IN AN ORGANIZED HEALTH CARE EDUCATION/TRAINING PROGRAM

## 2024-02-11 PROCEDURE — 2580000003 HC RX 258: Performed by: INTERNAL MEDICINE

## 2024-02-11 PROCEDURE — 6370000000 HC RX 637 (ALT 250 FOR IP): Performed by: CLINICAL NURSE SPECIALIST

## 2024-02-11 PROCEDURE — 83605 ASSAY OF LACTIC ACID: CPT

## 2024-02-11 PROCEDURE — 2700000000 HC OXYGEN THERAPY PER DAY

## 2024-02-11 PROCEDURE — 88237 TISSUE CULTURE BONE MARROW: CPT

## 2024-02-11 PROCEDURE — 88271 CYTOGENETICS DNA PROBE: CPT

## 2024-02-11 PROCEDURE — 80069 RENAL FUNCTION PANEL: CPT

## 2024-02-11 PROCEDURE — 2580000003 HC RX 258: Performed by: PHYSICIAN ASSISTANT

## 2024-02-11 PROCEDURE — 88275 CYTOGENETICS 100-300: CPT

## 2024-02-11 PROCEDURE — 2060000000 HC ICU INTERMEDIATE R&B

## 2024-02-11 PROCEDURE — 6360000002 HC RX W HCPCS: Performed by: PHYSICIAN ASSISTANT

## 2024-02-11 PROCEDURE — 94760 N-INVAS EAR/PLS OXIMETRY 1: CPT

## 2024-02-11 PROCEDURE — 85025 COMPLETE CBC W/AUTO DIFF WBC: CPT

## 2024-02-11 PROCEDURE — 6360000002 HC RX W HCPCS: Performed by: INTERNAL MEDICINE

## 2024-02-11 PROCEDURE — 6370000000 HC RX 637 (ALT 250 FOR IP): Performed by: INTERNAL MEDICINE

## 2024-02-11 PROCEDURE — 36415 COLL VENOUS BLD VENIPUNCTURE: CPT

## 2024-02-11 PROCEDURE — 6370000000 HC RX 637 (ALT 250 FOR IP): Performed by: PHYSICIAN ASSISTANT

## 2024-02-11 RX ORDER — HYDROXYZINE PAMOATE 25 MG/1
50 CAPSULE ORAL 3 TIMES DAILY PRN
Status: DISCONTINUED | OUTPATIENT
Start: 2024-02-11 | End: 2024-02-13 | Stop reason: HOSPADM

## 2024-02-11 RX ADMIN — ACETAMINOPHEN 650 MG: 325 TABLET ORAL at 20:58

## 2024-02-11 RX ADMIN — VANCOMYCIN HYDROCHLORIDE 1000 MG: 1 INJECTION, POWDER, LYOPHILIZED, FOR SOLUTION INTRAVENOUS at 00:12

## 2024-02-11 RX ADMIN — SODIUM CHLORIDE, PRESERVATIVE FREE 10 ML: 5 INJECTION INTRAVENOUS at 00:10

## 2024-02-11 RX ADMIN — FAMOTIDINE 20 MG: 20 TABLET ORAL at 20:58

## 2024-02-11 RX ADMIN — HYDROXYZINE PAMOATE 50 MG: 25 CAPSULE ORAL at 20:58

## 2024-02-11 RX ADMIN — MIRTAZAPINE 15 MG: 15 TABLET, FILM COATED ORAL at 20:58

## 2024-02-11 RX ADMIN — METRONIDAZOLE 500 MG: 250 TABLET ORAL at 06:15

## 2024-02-11 RX ADMIN — Medication 10 ML: at 09:05

## 2024-02-11 RX ADMIN — OXYCODONE HYDROCHLORIDE 5 MG: 5 TABLET ORAL at 09:04

## 2024-02-11 RX ADMIN — ACETAMINOPHEN 650 MG: 325 TABLET ORAL at 05:07

## 2024-02-11 RX ADMIN — OXYCODONE HYDROCHLORIDE 5 MG: 5 TABLET ORAL at 05:07

## 2024-02-11 RX ADMIN — OXYCODONE HYDROCHLORIDE 5 MG: 5 TABLET ORAL at 00:06

## 2024-02-11 RX ADMIN — FAMOTIDINE 20 MG: 20 TABLET ORAL at 09:04

## 2024-02-11 RX ADMIN — STANDARDIZED SENNA CONCENTRATE AND DOCUSATE SODIUM 1 TABLET: 8.6; 5 TABLET ORAL at 09:04

## 2024-02-11 RX ADMIN — HYDROXYZINE PAMOATE 50 MG: 25 CAPSULE ORAL at 09:04

## 2024-02-11 RX ADMIN — Medication 1 CAPSULE: at 09:04

## 2024-02-11 RX ADMIN — OXYCODONE HYDROCHLORIDE 5 MG: 5 TABLET ORAL at 20:57

## 2024-02-11 RX ADMIN — ENOXAPARIN SODIUM 40 MG: 100 INJECTION SUBCUTANEOUS at 09:04

## 2024-02-11 RX ADMIN — Medication 1 CAPSULE: at 20:58

## 2024-02-11 RX ADMIN — Medication 10 ML: at 20:58

## 2024-02-11 NOTE — FLOWSHEET NOTE
02/11/24 0214   Family/Significant Other Communication   Reason Updates   Name Linda   Relationship Spouse/Significant Other   Method of Communication Phone

## 2024-02-11 NOTE — PLAN OF CARE
Problem: Discharge Planning  Goal: Discharge to home or other facility with appropriate resources  2/11/2024 0931 by Ruma Fong RN  Outcome: Progressing     Problem: Pain  Goal: Verbalizes/displays adequate comfort level or baseline comfort level  2/11/2024 0931 by Ruma Fong RN  Outcome: Progressing     Problem: Safety - Adult  Goal: Free from fall injury  2/11/2024 0931 by Ruma Fong RN  Outcome: Progressing     Problem: ABCDS Injury Assessment  Goal: Absence of physical injury  2/11/2024 0931 by Ruma Fong RN  Outcome: Progressing     Problem: Decision Making  Goal: Pt/Family able to effectively weigh alternatives and participate in decision making related to treatment and care  Description: INTERVENTIONS:  1. Determine when there are differences between patient's view, family's view, and healthcare provider's view of condition  2. Facilitate patient and family articulation of goals for care  3. Help patient and family identify pros/cons of alternative solutions  4. Provide information as requested by patient/family  5. Respect patient/family right to receive or not to receive information  6. Serve as a liaison between patient and family and health care team  7. Initiate Consults from Ethics, Palliative Care or initiate Family Care Conference as is appropriate  2/11/2024 0931 by Ruma Fong RN  Outcome: Progressing     Problem: Respiratory - Adult  Goal: Achieves optimal ventilation and oxygenation  2/11/2024 0931 by Ruma Fong RN  Outcome: Progressing     Problem: Nutrition Deficit:  Goal: Optimize nutritional status  2/11/2024 0931 by Ruma Fong RN  Outcome: Progressing

## 2024-02-11 NOTE — ACP (ADVANCE CARE PLANNING)
Advanced Care Planning Note.    Purpose of Encounter: Advanced care planning in light of cancer  Parties In Attendance: Patient, , wife  Decisional Capacity: Yes, but patient is exhibiting signs of confusion and extreme fatigue.  Spoke with wife about further decision making  Subjective: Fatigue.  Objective: WBC 94  Goals of Care Determination: Patient/POA wishes to potentially receive chemotherapy for cancer if possible, but patient does not want intubation, chest compressions, or shocks.  Okay with medications.  Plan: Oncology consulted.  Monitoring labs.  Off antibiotics.  Code Status: Limited - no to intubation, CPR, shocks.  Yes to medications.   Time spent on Advanced care Plannin minutes  Advanced Care Planning Documents: Completed advanced directives on chart, wife is the POA.    Jose Daniel Isaac DO  2024 11:15 AM

## 2024-02-11 NOTE — PLAN OF CARE
Problem: Discharge Planning  Goal: Discharge to home or other facility with appropriate resources  2/10/2024 2051 by Avis Bernabe RN  Outcome: Progressing     Problem: Pain  Goal: Verbalizes/displays adequate comfort level or baseline comfort level  2/10/2024 2051 by Avis Bernabe, RN  Outcome: Progressing     Problem: Safety - Adult  Goal: Free from fall injury  2/10/2024 2051 by Avis Bernabe RN  Outcome: Progressing     Problem: ABCDS Injury Assessment  Goal: Absence of physical injury  2/10/2024 2051 by Avis Bernabe RN  Outcome: Progressing     Problem: Respiratory - Adult  Goal: Achieves optimal ventilation and oxygenation  2/10/2024 2051 by Avis Bernabe RN  Outcome: Progressing     Problem: Nutrition Deficit:  Goal: Optimize nutritional status  2/10/2024 2051 by Avis Bernabe, RN  Outcome: Progressing

## 2024-02-12 PROBLEM — E43 SEVERE MALNUTRITION (HCC): Chronic | Status: ACTIVE | Noted: 2024-01-23

## 2024-02-12 LAB
ALBUMIN SERPL-MCNC: 2.1 G/DL (ref 3.4–5)
ANION GAP SERPL CALCULATED.3IONS-SCNC: 14 MMOL/L (ref 3–16)
ANISOCYTOSIS BLD QL SMEAR: ABNORMAL
BASOPHILS # BLD: 0 K/UL (ref 0–0.2)
BASOPHILS # BLD: 0 K/UL (ref 0–0.2)
BASOPHILS NFR BLD: 0 %
BASOPHILS NFR BLD: 0 %
BUN SERPL-MCNC: 18 MG/DL (ref 7–20)
CALCIUM SERPL-MCNC: 10.2 MG/DL (ref 8.3–10.6)
CHLORIDE SERPL-SCNC: 96 MMOL/L (ref 99–110)
CO2 SERPL-SCNC: 24 MMOL/L (ref 21–32)
CREAT SERPL-MCNC: <0.5 MG/DL (ref 0.8–1.3)
DEPRECATED RDW RBC AUTO: 15 % (ref 12.4–15.4)
DEPRECATED RDW RBC AUTO: 15.6 % (ref 12.4–15.4)
EOSINOPHIL # BLD: 0 K/UL (ref 0–0.6)
EOSINOPHIL # BLD: 1 K/UL (ref 0–0.6)
EOSINOPHIL NFR BLD: 0 %
EOSINOPHIL NFR BLD: 1 %
GFR SERPLBLD CREATININE-BSD FMLA CKD-EPI: >60 ML/MIN/{1.73_M2}
GLUCOSE SERPL-MCNC: 111 MG/DL (ref 70–99)
HCT VFR BLD AUTO: 22.6 % (ref 40.5–52.5)
HCT VFR BLD AUTO: 26.9 % (ref 40.5–52.5)
HGB BLD-MCNC: 6.8 G/DL (ref 13.5–17.5)
HGB BLD-MCNC: 8.3 G/DL (ref 13.5–17.5)
LYMPHOCYTES # BLD: 0.9 K/UL (ref 1–5.1)
LYMPHOCYTES # BLD: 1.9 K/UL (ref 1–5.1)
LYMPHOCYTES NFR BLD: 2 %
MCH RBC QN AUTO: 25.1 PG (ref 26–34)
MCH RBC QN AUTO: 26 PG (ref 26–34)
MCHC RBC AUTO-ENTMCNC: 30.2 G/DL (ref 31–36)
MCHC RBC AUTO-ENTMCNC: 30.8 G/DL (ref 31–36)
MCV RBC AUTO: 83.1 FL (ref 80–100)
MCV RBC AUTO: 84.4 FL (ref 80–100)
METAMYELOCYTES NFR BLD MANUAL: 1 %
MONOCYTES # BLD: 1.7 K/UL (ref 0–1.3)
MONOCYTES # BLD: 1.9 K/UL (ref 0–1.3)
MONOCYTES NFR BLD: 2 %
MONOCYTES NFR BLD: 2 %
MYELOCYTES NFR BLD MANUAL: 1 %
NEUTROPHILS # BLD: 82.5 K/UL (ref 1.7–7.7)
NEUTROPHILS # BLD: 91.1 K/UL (ref 1.7–7.7)
NEUTROPHILS NFR BLD: 91 %
NEUTROPHILS NFR BLD: 96 %
NEUTS BAND NFR BLD MANUAL: 3 % (ref 0–7)
PATH INTERP BLD-IMP: NO
PATH INTERP BLD-IMP: NORMAL
PHOSPHATE SERPL-MCNC: 3.5 MG/DL (ref 2.5–4.9)
PLATELET # BLD AUTO: 253 K/UL (ref 135–450)
PLATELET # BLD AUTO: 331 K/UL (ref 135–450)
PLATELET BLD QL SMEAR: ADEQUATE
PLATELET BLD QL SMEAR: ADEQUATE
PMV BLD AUTO: 7.2 FL (ref 5–10.5)
PMV BLD AUTO: 7.3 FL (ref 5–10.5)
POLYCHROMASIA BLD QL SMEAR: ABNORMAL
POLYCHROMASIA BLD QL SMEAR: ABNORMAL
POTASSIUM SERPL-SCNC: 4 MMOL/L (ref 3.5–5.1)
RBC # BLD AUTO: 2.72 M/UL (ref 4.2–5.9)
RBC # BLD AUTO: 3.19 M/UL (ref 4.2–5.9)
SLIDE REVIEW: ABNORMAL
SLIDE REVIEW: ABNORMAL
SODIUM SERPL-SCNC: 134 MMOL/L (ref 136–145)
WBC # BLD AUTO: 85 K/UL (ref 4–11)
WBC # BLD AUTO: 95.9 K/UL (ref 4–11)

## 2024-02-12 PROCEDURE — 6370000000 HC RX 637 (ALT 250 FOR IP): Performed by: STUDENT IN AN ORGANIZED HEALTH CARE EDUCATION/TRAINING PROGRAM

## 2024-02-12 PROCEDURE — 02HV33Z INSERTION OF INFUSION DEVICE INTO SUPERIOR VENA CAVA, PERCUTANEOUS APPROACH: ICD-10-PCS | Performed by: STUDENT IN AN ORGANIZED HEALTH CARE EDUCATION/TRAINING PROGRAM

## 2024-02-12 PROCEDURE — 36415 COLL VENOUS BLD VENIPUNCTURE: CPT

## 2024-02-12 PROCEDURE — 6360000002 HC RX W HCPCS: Performed by: STUDENT IN AN ORGANIZED HEALTH CARE EDUCATION/TRAINING PROGRAM

## 2024-02-12 PROCEDURE — 2580000003 HC RX 258: Performed by: PHYSICIAN ASSISTANT

## 2024-02-12 PROCEDURE — 80069 RENAL FUNCTION PANEL: CPT

## 2024-02-12 PROCEDURE — 92526 ORAL FUNCTION THERAPY: CPT

## 2024-02-12 PROCEDURE — 85025 COMPLETE CBC W/AUTO DIFF WBC: CPT

## 2024-02-12 PROCEDURE — 94150 VITAL CAPACITY TEST: CPT

## 2024-02-12 PROCEDURE — 6370000000 HC RX 637 (ALT 250 FOR IP): Performed by: PHYSICIAN ASSISTANT

## 2024-02-12 PROCEDURE — 1200000000 HC SEMI PRIVATE

## 2024-02-12 PROCEDURE — 94760 N-INVAS EAR/PLS OXIMETRY 1: CPT

## 2024-02-12 PROCEDURE — 2580000003 HC RX 258: Performed by: STUDENT IN AN ORGANIZED HEALTH CARE EDUCATION/TRAINING PROGRAM

## 2024-02-12 PROCEDURE — 97129 THER IVNTJ 1ST 15 MIN: CPT

## 2024-02-12 PROCEDURE — 6360000002 HC RX W HCPCS: Performed by: PHYSICIAN ASSISTANT

## 2024-02-12 PROCEDURE — 36569 INSJ PICC 5 YR+ W/O IMAGING: CPT

## 2024-02-12 PROCEDURE — C1751 CATH, INF, PER/CENT/MIDLINE: HCPCS

## 2024-02-12 PROCEDURE — 99232 SBSQ HOSP IP/OBS MODERATE 35: CPT | Performed by: INTERNAL MEDICINE

## 2024-02-12 PROCEDURE — 6370000000 HC RX 637 (ALT 250 FOR IP): Performed by: CLINICAL NURSE SPECIALIST

## 2024-02-12 PROCEDURE — 6370000000 HC RX 637 (ALT 250 FOR IP): Performed by: INTERNAL MEDICINE

## 2024-02-12 PROCEDURE — 2700000000 HC OXYGEN THERAPY PER DAY

## 2024-02-12 RX ORDER — SODIUM CHLORIDE 0.9 % (FLUSH) 0.9 %
5-40 SYRINGE (ML) INJECTION EVERY 12 HOURS SCHEDULED
Status: DISCONTINUED | OUTPATIENT
Start: 2024-02-12 | End: 2024-02-13 | Stop reason: HOSPADM

## 2024-02-12 RX ORDER — SODIUM CHLORIDE 9 MG/ML
25 INJECTION, SOLUTION INTRAVENOUS PRN
Status: DISCONTINUED | OUTPATIENT
Start: 2024-02-12 | End: 2024-02-13 | Stop reason: HOSPADM

## 2024-02-12 RX ORDER — LIDOCAINE HYDROCHLORIDE 10 MG/ML
5 INJECTION, SOLUTION EPIDURAL; INFILTRATION; INTRACAUDAL; PERINEURAL ONCE
Status: DISCONTINUED | OUTPATIENT
Start: 2024-02-12 | End: 2024-02-13 | Stop reason: HOSPADM

## 2024-02-12 RX ORDER — SODIUM CHLORIDE 0.9 % (FLUSH) 0.9 %
5-40 SYRINGE (ML) INJECTION PRN
Status: DISCONTINUED | OUTPATIENT
Start: 2024-02-12 | End: 2024-02-13 | Stop reason: HOSPADM

## 2024-02-12 RX ORDER — FOLIC ACID 1 MG/1
1 TABLET ORAL DAILY
Status: DISCONTINUED | OUTPATIENT
Start: 2024-02-12 | End: 2024-02-12 | Stop reason: ALTCHOICE

## 2024-02-12 RX ADMIN — OXYCODONE HYDROCHLORIDE 5 MG: 5 TABLET ORAL at 12:38

## 2024-02-12 RX ADMIN — ENOXAPARIN SODIUM 40 MG: 100 INJECTION SUBCUTANEOUS at 09:28

## 2024-02-12 RX ADMIN — ACETAMINOPHEN 650 MG: 650 SUPPOSITORY RECTAL at 19:12

## 2024-02-12 RX ADMIN — OXYCODONE HYDROCHLORIDE 5 MG: 5 TABLET ORAL at 09:28

## 2024-02-12 RX ADMIN — FAMOTIDINE 20 MG: 20 TABLET ORAL at 20:43

## 2024-02-12 RX ADMIN — FAMOTIDINE 20 MG: 20 TABLET ORAL at 09:28

## 2024-02-12 RX ADMIN — Medication 10 ML: at 21:00

## 2024-02-12 RX ADMIN — OXYCODONE HYDROCHLORIDE 5 MG: 5 TABLET ORAL at 20:42

## 2024-02-12 RX ADMIN — STANDARDIZED SENNA CONCENTRATE AND DOCUSATE SODIUM 1 TABLET: 8.6; 5 TABLET ORAL at 09:28

## 2024-02-12 RX ADMIN — OXYCODONE HYDROCHLORIDE 5 MG: 5 TABLET ORAL at 00:02

## 2024-02-12 RX ADMIN — HYDROMORPHONE HYDROCHLORIDE 0.5 MG: 1 INJECTION, SOLUTION INTRAMUSCULAR; INTRAVENOUS; SUBCUTANEOUS at 16:31

## 2024-02-12 RX ADMIN — MIRTAZAPINE 15 MG: 15 TABLET, FILM COATED ORAL at 20:43

## 2024-02-12 RX ADMIN — Medication 10 ML: at 09:29

## 2024-02-12 RX ADMIN — Medication 1 CAPSULE: at 09:28

## 2024-02-12 RX ADMIN — FOLIC ACID 1 MG: 1 TABLET ORAL at 09:29

## 2024-02-12 NOTE — PLAN OF CARE
Problem: Discharge Planning  Goal: Discharge to home or other facility with appropriate resources  Outcome: Progressing     Problem: Pain  Goal: Verbalizes/displays adequate comfort level or baseline comfort level  Outcome: Progressing     Problem: Safety - Adult  Goal: Free from fall injury  Outcome: Progressing     Problem: ABCDS Injury Assessment  Goal: Absence of physical injury  Outcome: Progressing     Problem: Decision Making  Goal: Pt/Family able to effectively weigh alternatives and participate in decision making related to treatment and care  Description: INTERVENTIONS:  1. Determine when there are differences between patient's view, family's view, and healthcare provider's view of condition  2. Facilitate patient and family articulation of goals for care  3. Help patient and family identify pros/cons of alternative solutions  4. Provide information as requested by patient/family  5. Respect patient/family right to receive or not to receive information  6. Serve as a liaison between patient and family and health care team  7. Initiate Consults from Ethics, Palliative Care or initiate Family Care Conference as is appropriate  Outcome: Progressing     Problem: Skin/Tissue Integrity  Goal: Absence of new skin breakdown  Description: 1.  Monitor for areas of redness and/or skin breakdown  2.  Assess vascular access sites hourly  3.  Every 4-6 hours minimum:  Change oxygen saturation probe site  4.  Every 4-6 hours:  If on nasal continuous positive airway pressure, respiratory therapy assess nares and determine need for appliance change or resting period.  Outcome: Progressing

## 2024-02-12 NOTE — RT PROTOCOL NOTE
RT Inhaler-Nebulizer Bronchodilator Protocol Note    There is a bronchodilator order in the chart from a provider indicating to follow the RT Bronchodilator Protocol and there is an “Initiate RT Inhaler-Nebulizer Bronchodilator Protocol” order as well (see protocol at bottom of note).    CXR Findings:  No results found.    The findings from the last RT Protocol Assessment were as follows:   History Pulmonary Disease: Smoker 15 pack years or more  Respiratory Pattern: Regular pattern and RR 12-20 bpm  Breath Sounds: Slightly diminished and/or crackles  Cough: Strong, spontaneous, non-productive  Indication for Bronchodilator Therapy:    Bronchodilator Assessment Score: 3    Aerosolized bronchodilator medication orders have been revised according to the RT Inhaler-Nebulizer Bronchodilator Protocol below.    Respiratory Therapist to perform RT Therapy Protocol Assessment initially then follow the protocol.  Repeat RT Therapy Protocol Assessment PRN for score 0-3 or on second treatment, BID, and PRN for scores above 3.    No Indications - adjust the frequency to every 6 hours PRN wheezing or bronchospasm, if no treatments needed after 48 hours then discontinue using Per Protocol order mode.     If indication present, adjust the RT bronchodilator orders based on the Bronchodilator Assessment Score as indicated below.  Use Inhaler orders unless patient has one or more of the following: on home nebulizer, not able to hold breath for 10 seconds, is not alert and oriented, cannot activate and use MDI correctly, or respiratory rate 25 breaths per minute or more, then use the equivalent nebulizer order(s) with same Frequency and PRN reasons based on the score.  If a patient is on this medication at home then do not decrease Frequency below that used at home.    0-3 - enter or revise RT bronchodilator order(s) to equivalent RT Bronchodilator order with Frequency of every 4 hours PRN for wheezing or increased work of breathing

## 2024-02-12 NOTE — CARE COORDINATION
Case Management Assessment  Initial Evaluation    Date/Time of Evaluation: 2/12/2024 3:43 PM  Assessment Completed by: Diya Maria    If patient is discharged prior to next notation, then this note serves as note for discharge by case management.    Patient Name: Sarabjit Novoa                   YOB: 1965  Diagnosis: Hyponatremia [E87.1]  Immunocompromised (HCC) [D84.9]  On antineoplastic chemotherapy [Z79.899]  Septic shock (HCC) [A41.9, R65.21]  Acute on chronic respiratory failure with hypoxia (HCC) [J96.21]  Anemia, unspecified type [D64.9]                   Date / Time: 2/7/2024  4:23 PM    Patient Admission Status: Inpatient   Readmission Risk (Low < 19, Mod (19-27), High > 27): Readmission Risk Score: 23    Current PCP: Markos Quinn MD  PCP verified by CM? Yes    Chart Reviewed: Yes      History Provided by: Patient  Patient Orientation: Alert and Oriented    Patient Cognition: Alert    Hospitalization in the last 30 days (Readmission):  Yes    If yes, Readmission Assessment in CM Navigator will be completed.    Advance Directives:      Code Status: Limited   Patient's Primary Decision Maker is: Named in Scanned ACP Document      Discharge Planning:    Patient lives with: Spouse/Significant Other Type of Home: House  Primary Care Giver: Self  Patient Support Systems include: Spouse/Significant Other   Current Financial resources: Medicare  Current community resources: None  Current services prior to admission: None            Current DME: Wheelchair, Walker            Type of Home Care services:  None    ADLS  Prior functional level: Independent in ADLs/IADLs, Bathing, Dressing, Toileting, Feeding, Mobility  Current functional level: Assistance with the following:, Bathing, Dressing, Toileting, Mobility    PT AM-PAC:   /24  OT AM-PAC:   /24    Family can provide assistance at DC: Yes  Would you like Case Management to discuss the discharge plan with any other family

## 2024-02-12 NOTE — FLOWSHEET NOTE
02/11/24 6350   Family/Significant Other Communication   Reason Update   Name Linda   Relationship Spouse/Significant Other   Method of Communication Phone

## 2024-02-12 NOTE — CONSULTS
[facility]    Consult Note      Requesting Physician:  The hospitalist    CHIEF COMPLAINT:  Mental status change      HISTORY OF PRESENT ILLNESS:    Mr. Novoa  is a 58 y.o. male we are seeing in consultation for Metastatic lung cancer.  He was recently diagnosed metastatic non-small cell lung cancer.  He received first cycle of chemotherapy with carboplatin, Taxol and Keytruda during his last hospitalization.  He presented with fever and confusion.  He has leukocytosis has become worse.      ICD-10-CM    1. Septic shock (HCC)  A41.9     R65.21       2. Hyponatremia  E87.1       3. Anemia, unspecified type  D64.9       4. Immunocompromised (HCC)  D84.9       5. On antineoplastic chemotherapy  Z79.899              Past Medical History:  Past Medical History:   Diagnosis Date    Arthritis     Cancer (HCC)     Hyperlipidemia     Hypertension        Past Surgical History:  Past Surgical History:   Procedure Laterality Date    CT NEEDLE BIOPSY LUNG PERCUTANEOUS  12/21/2023    CT NEEDLE BIOPSY LUNG PERCUTANEOUS 12/21/2023 F CT SCAN       Current Medications:  Current Facility-Administered Medications   Medication Dose Route Frequency Provider Last Rate Last Admin    bisacodyl (DULCOLAX) EC tablet 5 mg  5 mg Oral Daily PRN Noemy Rodriguez PA-C        sennosides-docusate sodium (SENOKOT-S) 8.6-50 MG tablet 1 tablet  1 tablet Oral Daily Noemy Rodriguez PA-C        sodium chloride flush 0.9 % injection 5-40 mL  5-40 mL IntraVENous 2 times per day Noemy Rodriguez PA-C   10 mL at 02/08/24 2015    sodium chloride flush 0.9 % injection 10 mL  10 mL IntraVENous PRN Noemy Rodriguez PA-C        0.9 % sodium chloride infusion   IntraVENous PRN Noemy Rodriguez PA-C        ondansetron (ZOFRAN) injection 4 mg  4 mg IntraVENous Q6H PRN Noemy Rodriguez PA-C        acetaminophen 
    Infectious Diseases   Consult Note        Admission Date: 2/7/2024  Hospital Day: Hospital Day: 2   Attending: Jose Daniel Isaac DO  Date of service: 2/8/24     Reason for admission: Hyponatremia [E87.1]  Immunocompromised (HCC) [D84.9]  On antineoplastic chemotherapy [Z79.899]  Septic shock (HCC) [A41.9, R65.21]  Acute on chronic respiratory failure with hypoxia (HCC) [J96.21]  Anemia, unspecified type [D64.9]    Chief complaint/ Reason for consult: Sepsis    Microbiology:        I have reviewed allavailable micro lab data and cultures    Blood culture (2/2) - collected on 2/7/2024: In process    Antibiotics and immunizations:       Current antibiotics: All antibiotics and their doses were reviewed by me    Recent Abx Admin                     ceFEPIme (MAXIPIME) 2,000 mg in sodium chloride 0.9 % 100 mL IVPB (mini-bag) (mg) 2,000 mg New Bag 02/08/24 1055     2,000 mg New Bag  0209    vancomycin (VANCOCIN) 1,750 mg in sodium chloride 0.9 % 500 mL IVPB (mg) 1,750 mg New Bag 02/08/24 0851    vancomycin (VANCOCIN) 1,500 mg in sodium chloride 0.9 % 250 mL IVPB (Kkce3Iic) (mg) 1,500 mg New Bag 02/07/24 1928    ceFEPIme (MAXIPIME) 2,000 mg in sodium chloride 0.9 % 100 mL IVPB (mini-bag) (mg) 2,000 mg New Bag 02/07/24 1831                      Immunization History: All immunization history was reviewed by me today.    Immunization History   Administered Date(s) Administered    Influenza, FLUCELVAX, (age 6 mo+), MDCK, PF, 0.5mL 12/13/2022, 11/20/2023    Pneumococcal, PCV20, PREVNAR 20, (age 6w+), IM, 0.5mL 01/15/2024       Known drug allergies:     All allergies were reviewed and updated    Allergies   Allergen Reactions    Codeine Other (See Comments)     Patient does not recall reaction.    Norco [Hydrocodone-Acetaminophen] Rash       Social history:     Social History:  All social andepidemiologic history was reviewed and updated by me today as needed.     Tobacco use:   reports that he has quit smoking. His smoking 
    MD Luciano Huerta MD Samir Brahmbhatt, MD                Office: [phone]                      Fax: [phone]               [email or web address]                     Nephrology consult received. Full consult report will follow.   Chart reviewed briefly    Hold further IV fluids  Due to slight overcorrection, will start to D5, and to keep sodium goal : high 120s-low 130s over next 24 hours.    Close neurological monitoring and sodium trend monitoring      Thank you for allowing us to participate in this patient's care. Please do not hesitate to contact us anytime. We will follow along with you.       Payam Shelley MD  [facility]   [phone] or Via [employer] [facility].   
    PULMONARY AND CRITICAL CARE CONSULTATION NOTE    CONSULTING PHYSICIAN:      REASON FOR CONSULT:   Chief Complaint   Patient presents with    Altered Mental Status     Pt via EMS from home, wife called due to hallucinations and being altered, pt started chemo last week for lung cancer with mets to the bone, temp 100.5, had pneumonia recently with an admission, oxygen 89 on home 1 L       DATE OF CONSULT: 2/8/2024    HISTORY OF PRESENT ILLNESS: 58 y.o. year old male former smoker with past medical history of hypertension and recent diagnosis of stage IV non-small cell lung cancer who presented to hospital with complaints of confusion.  Patient was initially diagnosed with non-small cell lung cancer after undergoing CT-guided biopsy in December 2023.  He was referred to oncology at that time however, his treatment was deferred due to insurance issues.  Unfortunately, patient's cancer progressed with significant enlargement of right upper lobe mass invading rib cage and with extension to cervical and supraclavicular lymphadenopathy, liver and adrenal metastasis.  He was started on chemotherapy and received first cycle of Doxil, carboplatin and Keytruda on 1/25/2023.  Yesterday, patient was noted to have low-grade fever of 100.5.  COVID and influenza PCR is negative.  Patient was hyponatremic with sodium of 128.  Elevated lactic acid 3.6 in LA procalcitonin.  Mostly count of 77,000.  Hemoglobin of 8.6.  CT chest continues to show large right upper lobe mass with extension to rib cage, mediastinal and cervical lymphadenopathy, supraglottic lymphadenopathy.  Multiple pulmonary nodules.  Small right side pleural effusion.  Patient is admitted for sepsis and is on vancomycin and cefepime.    REVIEW OF SYSTEMS:   CONSTITUTIONAL SYMPTOMS: The patient denie night sweats, weight loss or weight gain.   HEENT: No vision changes. No tinnitus, Denies sinus pain. No hoarseness, or dysphagia.   NECK: Patient denies swelling in the 
PICC line education:    -Risks  -Benefits  -Alternatives  -Procedure    Discussed the above with patient, verbalized understanding, answered all questions. Provided with information on PICC care to review. PICC tip verified via [employer] (Ok to use). Reported off to patient's  Nurse Bg.  
reduced; LOC full/confusion   [] 20%  Bed bound; Extensive disease; Total care; Intake minimal; Drowsy/coma   [] 10%  Bed bound; Extensive disease; Total care; Mouth care only; Drowsy/coma   []  0%   Death       Home med list and hospital medications reviewed in chart as of 2/8/2024     EXAM     Vitals:    02/08/24 0734   BP: 107/62   Pulse: 96   Resp: 17   Temp: 98.3 °F (36.8 °C)   SpO2: 95%       Physical Exam  Constitutional:       Appearance: He is ill-appearing.   HENT:      Head: Normocephalic and atraumatic.      Nose: Nose normal.      Mouth/Throat:      Mouth: Mucous membranes are dry.   Eyes:      Pupils: Pupils are equal, round, and reactive to light.   Cardiovascular:      Rate and Rhythm: Normal rate.      Pulses: Normal pulses.      Heart sounds: No murmur heard.     No gallop.   Pulmonary:      Effort: Pulmonary effort is normal.   Chest:      Chest wall: Tenderness present.   Abdominal:      General: There is distension.   Musculoskeletal:      Cervical back: Neck supple.      Right lower leg: Edema present.      Left lower leg: Edema present.   Skin:     General: Skin is dry.      Coloration: Skin is pale.   Neurological:      Mental Status: He is alert. Mental status is at baseline. He is disoriented.      Motor: Weakness present.                OBJECTIVE   /62   Pulse 96   Temp 98.3 °F (36.8 °C) (Oral)   Resp 17   Ht 1.778 m (5' 10\")   Wt 67.4 kg (148 lb 9.6 oz)   SpO2 95%   BMI 21.32 kg/m²   I/O last 3 completed shifts:  In: 2224 [IV Piggyback:2224]  Out: 350 [Urine:350]  No intake/output data recorded.      Palliative Medicine Interventions:    patient/family support  Goals of Care discussions with patient/surrogate  Spiritual Interventions: none         DATA:  Current labs in the epic chart reviewed as of 2/8/2024   Review of previous notes, admits, labs, radiology and testing relevant to this consult done in this chart today 2/8/2024    Medical Decision Making:  The following 
results found for: \"TROPONINI\"    U/A:    Lab Results   Component Value Date/Time    COLORU Yellow 02/07/2024 08:50 PM    PROTEINU TRACE 02/07/2024 08:50 PM    PHUR 6.5 02/07/2024 08:50 PM    WBCUA 0 02/07/2024 08:50 PM    RBCUA 1 02/07/2024 08:50 PM    BACTERIA None Seen 02/07/2024 08:50 PM    CLARITYU Clear 02/07/2024 08:50 PM    SPECGRAV 1.015 02/07/2024 08:50 PM    LEUKOCYTESUR Negative 02/07/2024 08:50 PM    UROBILINOGEN 1.0 02/07/2024 08:50 PM    BILIRUBINUR Negative 02/07/2024 08:50 PM    BLOODU Negative 02/07/2024 08:50 PM    GLUCOSEU Negative 02/07/2024 08:50 PM     Microalbumen/Creatinine ratio:  No components found for: \"RUCREAT\"  24 Hour Urine for Protein:  No components found for: \"RAWUPRO\", \"UHRS3\", \"FUXO61EC\", \"UTV3\"  24 Hour Urine for Creatinine Clearance:  No components found for: \"CREAT4\", \"UHRS10\", \"UTV10\"  Urine Toxicology:  No components found for: \"IAMMENTA\", \"IBARBIT\", \"IBENZO\", \"ICOCAINE\", \"IMARTHC\", \"IOPIATES\", \"IPHENCYC\"    HgBA1c:    Lab Results   Component Value Date/Time    LABA1C 5.8 06/12/2023 08:36 AM     RPR:  No results found for: \"RPR\"  HIV:  No results found for: \"HIV\"  BETTY:  No results found for: \"ANATITER\", \"BETTY\"  RF:  No results found for: \"RF\"  DSDNA:  No components found for: \"DNA\"  AMYLASE:  No results found for: \"AMYLASE\"  LIPASE:  No results found for: \"LIPASE\"  Fibrinogen Level:  No components found for: \"FIB\"       BELOW MENTIONED RADIOLOGY STUDY RESULTS BY ME (AS NEEDED FOR MY EVALUATION AND MANAGEMENT).     CT CHEST PULMONARY EMBOLISM W CONTRAST    Result Date: 2/7/2024  EXAMINATION: CTA OF THE CHEST 2/7/2024 10:28 pm TECHNIQUE: CTA of the chest was performed after the administration of intravenous contrast.  Multiplanar reformatted images are provided for review.  MIP images are provided for review. Automated exposure control, iterative reconstruction, and/or weight based adjustment of the mA/kV was utilized to reduce the radiation dose to as low as reasonably

## 2024-02-12 NOTE — CARE COORDINATION
02/12/24 1539   Readmission Assessment   Number of Days since last admission? 8-30 days   Previous Disposition Home with Family   Who is being Interviewed Patient   What was the patient's/caregiver's perception as to why they think they needed to return back to the hospital? Other (Comment)  ('I got scared\")   Did you visit your Primary Care Physician after you left the hospital, before you returned this time? Yes   Did you see a specialist, such as Cardiac, Pulmonary, Orthopedic Physician, etc. after you left the hospital? No   Who advised the patient to return to the hospital? Self-referral   Does the patient report anything that got in the way of taking their medications? No   In our efforts to provide the best possible care to you and others like you, can you think of anything that we could have done to help you after you left the hospital the first time, so that you might not have needed to return so soon? Other (Comment)  (No)     Electronically signed by Diya Maria on 2/12/24 at 3:43 PM EST

## 2024-02-13 VITALS
TEMPERATURE: 98.2 F | HEART RATE: 100 BPM | OXYGEN SATURATION: 93 % | RESPIRATION RATE: 28 BRPM | HEIGHT: 70 IN | DIASTOLIC BLOOD PRESSURE: 65 MMHG | BODY MASS INDEX: 20.97 KG/M2 | WEIGHT: 146.5 LBS | SYSTOLIC BLOOD PRESSURE: 100 MMHG

## 2024-02-13 LAB
ALBUMIN SERPL-MCNC: 2.1 G/DL (ref 3.4–5)
ANION GAP SERPL CALCULATED.3IONS-SCNC: 10 MMOL/L (ref 3–16)
ANISOCYTOSIS BLD QL SMEAR: ABNORMAL
BASOPHILS # BLD: 0 K/UL (ref 0–0.2)
BASOPHILS NFR BLD: 0 %
BUN SERPL-MCNC: 20 MG/DL (ref 7–20)
CALCIUM SERPL-MCNC: 10.4 MG/DL (ref 8.3–10.6)
CHLORIDE SERPL-SCNC: 98 MMOL/L (ref 99–110)
CO2 SERPL-SCNC: 27 MMOL/L (ref 21–32)
CREAT SERPL-MCNC: <0.5 MG/DL (ref 0.8–1.3)
DEPRECATED RDW RBC AUTO: 16.2 % (ref 12.4–15.4)
EOSINOPHIL # BLD: 0 K/UL (ref 0–0.6)
EOSINOPHIL NFR BLD: 0 %
GFR SERPLBLD CREATININE-BSD FMLA CKD-EPI: >60 ML/MIN/{1.73_M2}
GLUCOSE SERPL-MCNC: 113 MG/DL (ref 70–99)
HCT VFR BLD AUTO: 24.8 % (ref 40.5–52.5)
HGB BLD-MCNC: 7.8 G/DL (ref 13.5–17.5)
LYMPHOCYTES # BLD: 1.9 K/UL (ref 1–5.1)
LYMPHOCYTES NFR BLD: 2 %
MCH RBC QN AUTO: 26.2 PG (ref 26–34)
MCHC RBC AUTO-ENTMCNC: 31.4 G/DL (ref 31–36)
MCV RBC AUTO: 83.3 FL (ref 80–100)
MONOCYTES # BLD: 0 K/UL (ref 0–1.3)
MONOCYTES NFR BLD: 0 %
MYELOCYTES NFR BLD MANUAL: 1 %
NEUTROPHILS # BLD: 92.5 K/UL (ref 1.7–7.7)
NEUTROPHILS NFR BLD: 97 %
PATH INTERP BLD-IMP: NO
PHOSPHATE SERPL-MCNC: 3.5 MG/DL (ref 2.5–4.9)
PLATELET # BLD AUTO: 197 K/UL (ref 135–450)
PLATELET BLD QL SMEAR: ADEQUATE
PMV BLD AUTO: 7.1 FL (ref 5–10.5)
POTASSIUM SERPL-SCNC: 3.9 MMOL/L (ref 3.5–5.1)
RBC # BLD AUTO: 2.98 M/UL (ref 4.2–5.9)
SLIDE REVIEW: ABNORMAL
SODIUM SERPL-SCNC: 135 MMOL/L (ref 136–145)
TSH SERPL DL<=0.005 MIU/L-ACNC: 1.44 UIU/ML (ref 0.27–4.2)
WBC # BLD AUTO: 94.4 K/UL (ref 4–11)

## 2024-02-13 PROCEDURE — 2500000003 HC RX 250 WO HCPCS: Performed by: STUDENT IN AN ORGANIZED HEALTH CARE EDUCATION/TRAINING PROGRAM

## 2024-02-13 PROCEDURE — 6370000000 HC RX 637 (ALT 250 FOR IP): Performed by: STUDENT IN AN ORGANIZED HEALTH CARE EDUCATION/TRAINING PROGRAM

## 2024-02-13 PROCEDURE — 6370000000 HC RX 637 (ALT 250 FOR IP): Performed by: PHYSICIAN ASSISTANT

## 2024-02-13 PROCEDURE — 85025 COMPLETE CBC W/AUTO DIFF WBC: CPT

## 2024-02-13 PROCEDURE — 84443 ASSAY THYROID STIM HORMONE: CPT

## 2024-02-13 PROCEDURE — 6370000000 HC RX 637 (ALT 250 FOR IP): Performed by: INTERNAL MEDICINE

## 2024-02-13 PROCEDURE — 97129 THER IVNTJ 1ST 15 MIN: CPT

## 2024-02-13 PROCEDURE — 6360000002 HC RX W HCPCS: Performed by: PHYSICIAN ASSISTANT

## 2024-02-13 PROCEDURE — 80069 RENAL FUNCTION PANEL: CPT

## 2024-02-13 PROCEDURE — 92526 ORAL FUNCTION THERAPY: CPT

## 2024-02-13 PROCEDURE — 2580000003 HC RX 258: Performed by: STUDENT IN AN ORGANIZED HEALTH CARE EDUCATION/TRAINING PROGRAM

## 2024-02-13 RX ORDER — SCOLOPAMINE TRANSDERMAL SYSTEM 1 MG/1
1 PATCH, EXTENDED RELEASE TRANSDERMAL
Status: DISCONTINUED | OUTPATIENT
Start: 2024-02-13 | End: 2024-02-13 | Stop reason: HOSPADM

## 2024-02-13 RX ADMIN — OXYCODONE HYDROCHLORIDE 5 MG: 5 TABLET ORAL at 04:51

## 2024-02-13 RX ADMIN — STANDARDIZED SENNA CONCENTRATE AND DOCUSATE SODIUM 1 TABLET: 8.6; 5 TABLET ORAL at 09:29

## 2024-02-13 RX ADMIN — OXYCODONE HYDROCHLORIDE 5 MG: 5 TABLET ORAL at 01:12

## 2024-02-13 RX ADMIN — ENOXAPARIN SODIUM 40 MG: 100 INJECTION SUBCUTANEOUS at 09:30

## 2024-02-13 RX ADMIN — Medication 1 CAPSULE: at 09:29

## 2024-02-13 RX ADMIN — OXYCODONE HYDROCHLORIDE 5 MG: 5 TABLET ORAL at 09:29

## 2024-02-13 RX ADMIN — FOLIC ACID 1 MG: 5 INJECTION, SOLUTION INTRAMUSCULAR; INTRAVENOUS; SUBCUTANEOUS at 12:17

## 2024-02-13 RX ADMIN — FAMOTIDINE 20 MG: 20 TABLET ORAL at 09:29

## 2024-02-13 RX ADMIN — Medication 10 ML: at 09:36

## 2024-02-13 RX ADMIN — OXYCODONE HYDROCHLORIDE 5 MG: 5 TABLET ORAL at 17:24

## 2024-02-13 RX ADMIN — OXYCODONE HYDROCHLORIDE 5 MG: 5 TABLET ORAL at 13:06

## 2024-02-13 NOTE — PROCEDURES
Called patient's wife Linda to update her about Patient. Plan of care will continue. Patient is A&O, awake in bed, bed is low and locked, call light and table within reach, nonskid socks are are.

## 2024-02-13 NOTE — CARE COORDINATION
Discharge Planning:     ([facility]) spoke with the patients spouse-Linda. She would like to meet with hospice today. She is uncertain at this time if she wants her  to try another round of chemo which she stated would start tomorrow. CM updated Dr Cuadra and sent the referral to [facility] per family request.    Electronically signed by Diya Maria on 2/13/24 at 11:31 AM EST

## 2024-02-13 NOTE — PLAN OF CARE
Problem: Discharge Planning  Goal: Discharge to home or other facility with appropriate resources  Outcome: Progressing     Problem: Pain  Goal: Verbalizes/displays adequate comfort level or baseline comfort level  Outcome: Progressing     Problem: Safety - Adult  Goal: Free from fall injury  Outcome: Progressing     Problem: ABCDS Injury Assessment  Goal: Absence of physical injury  Outcome: Progressing     Problem: Decision Making  Goal: Pt/Family able to effectively weigh alternatives and participate in decision making related to treatment and care  Description: INTERVENTIONS:  1. Determine when there are differences between patient's view, family's view, and healthcare provider's view of condition  2. Facilitate patient and family articulation of goals for care  3. Help patient and family identify pros/cons of alternative solutions  4. Provide information as requested by patient/family  5. Respect patient/family right to receive or not to receive information  6. Serve as a liaison between patient and family and health care team  7. Initiate Consults from Ethics, Palliative Care or initiate Family Care Conference as is appropriate  Outcome: Progressing     Problem: Respiratory - Adult  Goal: Achieves optimal ventilation and oxygenation  Outcome: Progressing     Problem: Nutrition Deficit:  Goal: Optimize nutritional status  Outcome: Progressing     Problem: Skin/Tissue Integrity  Goal: Absence of new skin breakdown  Description: 1.  Monitor for areas of redness and/or skin breakdown  2.  Assess vascular access sites hourly  3.  Every 4-6 hours minimum:  Change oxygen saturation probe site  4.  Every 4-6 hours:  If on nasal continuous positive airway pressure, respiratory therapy assess nares and determine need for appliance change or resting period.  Outcome: Progressing     Problem: Neurosensory - Adult  Goal: Achieves stable or improved neurological status  Outcome: Progressing  Goal: Achieves maximal

## 2024-02-13 NOTE — PROGRESS NOTES
ONCOLOGY HEMATOLOGY CARE PROGRESS NOTE      SUBJECTIVE:  About the same.  Denies pain.  Dyspnea on exertion.     ROS:     Constitutional:  No weight loss, No fever, No chills, No night sweats.   Eyes:  No impairment or change in vision  ENT / Mouth:  No pain, abnormal ulceration, bleeding, nasal drip or change in voice or hearing  Cardiovascular:  No chest pain, palpitations, new edema, or calf discomfort  Respiratory:  No pain, hemoptysis, change to breathing  Breast:  No pain, discharge, change in appearance or texture  Gastrointestinal:  No pain, cramping, jaundice, change to eating and bowel habits  Urinary:  No pain, bleeding or change in continence  Genitalia: No pain, bleeding or discharge  Musculoskeletal:  No redness, pain, edema or weakness  Skin:  No pruritus, rash, change to nodules or lesions  Neurologic:  No discomfort, change in mental status, speech, sensory or motor activity  Psychiatric:  No change in concentration or change to affect or mood  Endocrine:  No hot flashes, increased thirst, or change to urine production  Hematologic: No petechiae, ecchymosis or bleeding  Lymphatic:  No lymphadenopathy or lymphedema  Allergy / Immunologic:  No eczema, hives, frequent or recurrent infections    OBJECTIVE        Physical    VITALS:  Patient Vitals for the past 24 hrs:   BP Temp Temp src Pulse Resp SpO2   02/10/24 1149 -- -- -- (!) 112 -- --   02/10/24 1013 -- -- -- (!) 110 -- --   02/10/24 0851 -- -- -- (!) 112 -- --   02/10/24 0715 105/72 97.7 °F (36.5 °C) Oral 96 26 95 %   02/10/24 0405 124/80 98.3 °F (36.8 °C) Oral (!) 109 23 93 %   02/10/24 0310 -- -- -- (!) 112 -- --   02/10/24 0026 -- -- -- (!) 109 -- --   02/09/24 2355 104/68 98.1 °F (36.7 °C) Oral (!) 112 18 93 %   02/09/24 2126 -- -- -- -- 20 --   02/09/24 2054 115/76 -- -- (!) 119 22 93 %   02/09/24 2014 -- -- -- (!) 124 -- --   02/09/24 1923 129/81 98.2 °F (36.8 °C) Oral (!) 109 18 93 %   02/09/24 1607 
                                      ONCOLOGY HEMATOLOGY CARE PROGRESS NOTE      SUBJECTIVE:  About the same.  No major changes.     ROS:     Constitutional:  No weight loss, No fever, No chills, No night sweats.   Eyes:  No impairment or change in vision  ENT / Mouth:  No pain, abnormal ulceration, bleeding, nasal drip or change in voice or hearing  Cardiovascular:  No chest pain, palpitations, new edema, or calf discomfort  Respiratory:  No pain, hemoptysis, change to breathing  Breast:  No pain, discharge, change in appearance or texture  Gastrointestinal:  No pain, cramping, jaundice, change to eating and bowel habits  Urinary:  No pain, bleeding or change in continence  Genitalia: No pain, bleeding or discharge  Musculoskeletal:  No redness, pain, edema or weakness  Skin:  No pruritus, rash, change to nodules or lesions  Neurologic:  No discomfort, change in mental status, speech, sensory or motor activity  Psychiatric:  No change in concentration or change to affect or mood  Endocrine:  No hot flashes, increased thirst, or change to urine production  Hematologic: No petechiae, ecchymosis or bleeding  Lymphatic:  No lymphadenopathy or lymphedema  Allergy / Immunologic:  No eczema, hives, frequent or recurrent infections    OBJECTIVE        Physical    VITALS:  Patient Vitals for the past 24 hrs:   BP Temp Temp src Pulse Resp SpO2 Height   02/13/24 0710 111/68 97.4 °F (36.3 °C) Oral (!) 114 20 92 % --   02/13/24 0430 109/72 98.8 °F (37.1 °C) Oral (!) 114 28 95 % --   02/13/24 0142 -- -- -- -- 22 -- --   02/13/24 0010 102/66 97.4 °F (36.3 °C) Oral (!) 107 19 95 % --   02/12/24 2112 -- -- -- -- 20 -- --   02/12/24 2005 -- -- -- (!) 109 19 95 % --   02/12/24 1945 108/66 98.1 °F (36.7 °C) Oral (!) 117 19 93 % --   02/12/24 1610 129/73 97.5 °F (36.4 °C) Axillary (!) 128 16 91 % --   02/12/24 1235 114/68 98.1 °F (36.7 °C) Oral (!) 113 -- 93 % --   02/12/24 1006 -- -- -- -- -- -- 1.778 m (5' 10\")   02/12/24 0905 
                                      ONCOLOGY HEMATOLOGY CARE PROGRESS NOTE      SUBJECTIVE:  Confused this morning.  Wants to leave the hospital.    ROS:     Constitutional:  No weight loss, No fever, No chills, No night sweats.   Eyes:  No impairment or change in vision  ENT / Mouth:  No pain, abnormal ulceration, bleeding, nasal drip or change in voice or hearing  Cardiovascular:  No chest pain, palpitations, new edema, or calf discomfort  Respiratory:  No pain, hemoptysis, change to breathing  Breast:  No pain, discharge, change in appearance or texture  Gastrointestinal:  No pain, cramping, jaundice, change to eating and bowel habits  Urinary:  No pain, bleeding or change in continence  Genitalia: No pain, bleeding or discharge  Musculoskeletal:  No redness, pain, edema or weakness  Skin:  No pruritus, rash, change to nodules or lesions  Neurologic:  No discomfort, change in mental status, speech, sensory or motor activity  Psychiatric:  No change in concentration or change to affect or mood  Endocrine:  No hot flashes, increased thirst, or change to urine production  Hematologic: No petechiae, ecchymosis or bleeding  Lymphatic:  No lymphadenopathy or lymphedema  Allergy / Immunologic:  No eczema, hives, frequent or recurrent infections    OBJECTIVE        Physical    VITALS:  Patient Vitals for the past 24 hrs:   BP Temp Temp src Pulse Resp SpO2   02/12/24 0651 108/70 98.3 °F (36.8 °C) Oral (!) 116 18 93 %   02/12/24 0549 120/76 98.6 °F (37 °C) Oral (!) 112 24 92 %   02/12/24 0032 -- -- -- -- 20 --   02/12/24 0002 -- -- -- -- 24 --   02/11/24 2357 115/83 97.6 °F (36.4 °C) Oral 99 24 95 %   02/11/24 2127 -- -- -- -- 22 --   02/11/24 2059 131/82 -- -- (!) 126 26 --   02/11/24 2057 -- -- -- -- 26 --   02/11/24 1908 117/83 98.1 °F (36.7 °C) Oral (!) 125 16 92 %   02/11/24 1745 120/79 -- -- (!) 114 18 92 %   02/11/24 1333 -- -- -- (!) 104 -- --   02/11/24 1212 113/65 -- -- (!) 107 30 92 %   02/11/24 1203 -- -- -- 
                                      ONCOLOGY HEMATOLOGY CARE PROGRESS NOTE      SUBJECTIVE:  Slightly better.  Denies chest pain or shortness of breath.    ROS:     Constitutional:  No weight loss, No fever, No chills, No night sweats.   Eyes:  No impairment or change in vision  ENT / Mouth:  No pain, abnormal ulceration, bleeding, nasal drip or change in voice or hearing  Cardiovascular:  No chest pain, palpitations, new edema, or calf discomfort  Respiratory:  No pain, hemoptysis, change to breathing  Breast:  No pain, discharge, change in appearance or texture  Gastrointestinal:  No pain, cramping, jaundice, change to eating and bowel habits  Urinary:  No pain, bleeding or change in continence  Genitalia: No pain, bleeding or discharge  Musculoskeletal:  No redness, pain, edema or weakness  Skin:  No pruritus, rash, change to nodules or lesions  Neurologic:  No discomfort, change in mental status, speech, sensory or motor activity  Psychiatric:  No change in concentration or change to affect or mood  Endocrine:  No hot flashes, increased thirst, or change to urine production  Hematologic: No petechiae, ecchymosis or bleeding  Lymphatic:  No lymphadenopathy or lymphedema  Allergy / Immunologic:  No eczema, hives, frequent or recurrent infections    OBJECTIVE        Physical    VITALS:  Patient Vitals for the past 24 hrs:   BP Temp Temp src Pulse Resp SpO2 Weight   02/09/24 2054 115/76 -- -- (!) 119 22 93 % --   02/09/24 1923 129/81 98.2 °F (36.8 °C) Oral (!) 109 18 93 % --   02/09/24 1607 123/78 98.2 °F (36.8 °C) Oral (!) 117 16 93 % --   02/09/24 1318 -- -- -- -- 16 -- --   02/09/24 1248 -- -- -- -- 18 -- --   02/09/24 1157 112/75 98.5 °F (36.9 °C) Oral (!) 109 16 94 % --   02/09/24 0859 -- -- -- -- 18 -- --   02/09/24 0830 -- -- -- -- -- -- 67 kg (147 lb 12.7 oz)   02/09/24 0829 -- -- -- -- 16 -- --   02/09/24 0730 106/68 97.6 °F (36.4 °C) Oral (!) 112 18 93 % --   02/09/24 0330 110/73 98 °F (36.7 °C) Oral (!) 
      Hospitalist Progress Note    Name:  Sarabjit Novoa    /Age/Sex: 1965  (58 y.o. male)  MRN & CSN:  [id number] & [id number]    PCP: Markos Quinn MD    Date of Admission: 2024    Patient Status:  Inpatient     Chief Complaint:   Chief Complaint   Patient presents with    Altered Mental Status     Pt via EMS from home, wife called due to hallucinations and being altered, pt started chemo last week for lung cancer with mets to the bone, temp 100.5, had pneumonia recently with an admission, oxygen 89 on home 1 L       Hospital Course:   Patient admitted for altered mental status.  Undergoing treatment for stage IV lung cancer with metastasis to the bone.  Patient found to be septic on admission and given his immunocompromise state admitted to the hospital.  Started on broad-spectrum antibiotics.  ID consulted.  Oncology consulted.  Given his history of lung cancer, pulmonary consulted. Patient presented with possible pneumonia.    Found to be hyponatremic on admission.  Nephrology consulted.    Subjective:  Today is:  Hospital Day: 2.  Patient seen and examined in 3TN-3362/3362-01.     Sitting up in chair.  Denies pain.  Admits to some increased shortness of breath from his baseline and currently requiring 4 L O2 via NC.  Normally on 1-2 L baseline.      Medications:  Reviewed    Infusion Medications    sodium chloride       Scheduled Medications    sennosides-docusate sodium  1 tablet Oral Daily    sodium chloride flush  5-40 mL IntraVENous 2 times per day    lactobacillus  1 capsule Oral BID WC    enoxaparin  40 mg SubCUTAneous Daily    famotidine  20 mg Oral BID    vancomycin  1,750 mg IntraVENous Q12H    mirtazapine  15 mg Oral Nightly    oxyCODONE  5 mg Oral Q4H    levofloxacin  750 mg IntraVENous Q24H    metroNIDAZOLE  500 mg Oral 3 times per day     PRN Meds: bisacodyl, sodium chloride flush, sodium chloride, ondansetron, acetaminophen **OR** acetaminophen, albuterol, 
      Hospitalist Progress Note    Name:  Sarabjit Novoa    /Age/Sex: 1965  (58 y.o. male)  MRN & CSN:  [id number] & [id number]    PCP: Markos Quinn MD    Date of Admission: 2024    Patient Status:  Inpatient     Chief Complaint:   Chief Complaint   Patient presents with    Altered Mental Status     Pt via EMS from home, wife called due to hallucinations and being altered, pt started chemo last week for lung cancer with mets to the bone, temp 100.5, had pneumonia recently with an admission, oxygen 89 on home 1 L       Hospital Course:   Patient admitted for altered mental status.  Undergoing treatment for stage IV lung cancer with metastasis to the bone.  Patient found to be septic on admission and given his immunocompromise state admitted to the hospital.  Started on broad-spectrum antibiotics.  ID consulted.  Oncology consulted.  Given his history of lung cancer, pulmonary consulted. Patient presented with possible pneumonia.    Found to be hyponatremic on admission.  Nephrology consulted.    Subjective:  Today is:  Hospital Day: 4.  Patient seen and examined in 3TN-3362/3362-01.     In bed.  Less confusion today, but still confused overall.  Denies pain.  Back down to home oxygen level of 2 L O2 via NC.      Medications:  Reviewed    Infusion Medications    sodium chloride      sodium chloride Stopped (24 1837)     Scheduled Medications    vancomycin  1,000 mg IntraVENous Q8H    sennosides-docusate sodium  1 tablet Oral Daily    sodium chloride flush  5-40 mL IntraVENous 2 times per day    lactobacillus  1 capsule Oral BID WC    enoxaparin  40 mg SubCUTAneous Daily    famotidine  20 mg Oral BID    mirtazapine  15 mg Oral Nightly    oxyCODONE  5 mg Oral Q4H    levofloxacin  750 mg IntraVENous Q24H    metroNIDAZOLE  500 mg Oral 3 times per day     PRN Meds: sodium chloride, bisacodyl, sodium chloride flush, sodium chloride, ondansetron, acetaminophen **OR** acetaminophen, albuterol, 
      Hospitalist Progress Note    Name:  Sarabjit Novoa    /Age/Sex: 1965  (58 y.o. male)  MRN & CSN:  [id number] & [id number]    PCP: Markos Quinn MD    Date of Admission: 2024    Patient Status:  Inpatient     Chief Complaint:   Chief Complaint   Patient presents with    Altered Mental Status     Pt via EMS from home, wife called due to hallucinations and being altered, pt started chemo last week for lung cancer with mets to the bone, temp 100.5, had pneumonia recently with an admission, oxygen 89 on home 1 L       Hospital Course:   Patient admitted for altered mental status.  Undergoing treatment for stage IV lung cancer with metastasis to the bone.  Patient found to be septic on admission and given his immunocompromise state admitted to the hospital.  Started on broad-spectrum antibiotics.  ID consulted.  Oncology consulted.  Given his history of lung cancer, pulmonary consulted. Patient presented with possible pneumonia.    Found to be hyponatremic on admission.  Nephrology consulted.    Spoke with wife on  regarding continuing aggressive cancer treatment versus hospice.  Patient's wife agreed with hospice consult, but would like to continue talks with oncology if possible, but understanding patient is extremely sick and may not tolerate any further chemotherapy.    Subjective:  Today is:  Hospital Day: 5.  Patient seen and examined in 3TN-3362/3362-01.     In bed.  Sleepy.  Denies pain.      Medications:  Reviewed    Infusion Medications    sodium chloride      sodium chloride Stopped (24 1837)     Scheduled Medications    sennosides-docusate sodium  1 tablet Oral Daily    sodium chloride flush  5-40 mL IntraVENous 2 times per day    lactobacillus  1 capsule Oral BID WC    enoxaparin  40 mg SubCUTAneous Daily    famotidine  20 mg Oral BID    mirtazapine  15 mg Oral Nightly    oxyCODONE  5 mg Oral Q4H     PRN Meds: hydrOXYzine pamoate, sodium chloride, bisacodyl, sodium chloride 
      Hospitalist Progress Note    Name:  Sarabjit Novoa    /Age/Sex: 1965  (58 y.o. male)  MRN & CSN:  [id number] & [id number]    PCP: Markos Quinn MD    Date of Admission: 2024    Patient Status:  Inpatient     Chief Complaint:   Chief Complaint   Patient presents with    Altered Mental Status     Pt via EMS from home, wife called due to hallucinations and being altered, pt started chemo last week for lung cancer with mets to the bone, temp 100.5, had pneumonia recently with an admission, oxygen 89 on home 1 L       Hospital Course:   Patient admitted for altered mental status.  Undergoing treatment for stage IV lung cancer with metastasis to the bone.  Patient found to be septic on admission and given his immunocompromise state admitted to the hospital.  Started on broad-spectrum antibiotics.  ID consulted.  Oncology consulted.  Given his history of lung cancer, pulmonary consulted. Patient presented with possible pneumonia.    Found to be hyponatremic on admission.  Nephrology consulted.    Subjective:  Today is:  Hospital Day: 3.  Patient seen and examined in 3TN-3362/3362-01.     In bed. Still a bit confused. On 3L O2. About to eat some breakfast. Pain controlled.      Medications:  Reviewed    Infusion Medications    sodium chloride 25 mL (24 0841)     Scheduled Medications    vancomycin  1,000 mg IntraVENous Q8H    sennosides-docusate sodium  1 tablet Oral Daily    sodium chloride flush  5-40 mL IntraVENous 2 times per day    lactobacillus  1 capsule Oral BID WC    enoxaparin  40 mg SubCUTAneous Daily    famotidine  20 mg Oral BID    mirtazapine  15 mg Oral Nightly    oxyCODONE  5 mg Oral Q4H    levofloxacin  750 mg IntraVENous Q24H    metroNIDAZOLE  500 mg Oral 3 times per day     PRN Meds: bisacodyl, sodium chloride flush, sodium chloride, ondansetron, acetaminophen **OR** acetaminophen, albuterol, HYDROmorphone      Intake/Output Summary (Last 24 hours) at 2024 1030  Last data 
    Clinical Pharmacy Note: Pharmacy to Dose Vancomycin    Sarabjit Novoa is a 58 y.o. male started on Vancomycin for HAP; consult received from Noemychristofer Rodriguez to manage therapy. Also receiving the following antibiotics: Cefepime.    Goal AUC: 400-600 mg/L*hr    Assessment/Plan:  A 1500 mg loading dose was given on 02/07 at 1928  Initiate vancomycin 1750 mg IV every 12 hours. Bayesian modeling predicts an AUC of 510 mg/L*hr and a trough of 9.9 mcg/mL at steady state concentration.  A vancomycin random level has been ordered for 02/09 at 0600  Changes in regimen will be determined based on culture results, renal function, and clinical response.  Pharmacy will continue to monitor and adjust regimen as necessary.    Allergies:  Codeine and Norco [hydrocodone-acetaminophen]     Recent Labs     02/07/24  1700 02/07/24  2024   CREATININE 0.6* <0.5*       Recent Labs     02/07/24  1700   WBC 70.3*       Ht Readings from Last 1 Encounters:   02/08/24 1.778 m (5' 10\")        Wt Readings from Last 1 Encounters:   02/08/24 67.4 kg (148 lb 9.6 oz)         Estimated Creatinine Clearance: 150 mL/min (based on SCr of 0.5 mg/dL).      Thank you for the consult,    Trey Lizama, AnthonyD      
    Clinical Pharmacy Note: Pharmacy to Dose Vancomycin    Vancomycin Day: 3  Indication: sepsis  Current Dose: 1750mg q12h  Dosing Method: Bayesian Modeling    Random: 13.6    Recent Labs     02/08/24  2247 02/09/24  0522   BUN 11 10       Recent Labs     02/08/24  2247 02/09/24  0522   CREATININE 0.6* <0.5*       Recent Labs     02/08/24  0651 02/09/24  0522   WBC 77.4* 77.1*         Intake/Output Summary (Last 24 hours) at 2/9/2024 0730  Last data filed at 2/9/2024 0114  Gross per 24 hour   Intake 120 ml   Output 350 ml   Net -230 ml         Ht Readings from Last 1 Encounters:   02/08/24 1.778 m (5' 10\")        Wt Readings from Last 1 Encounters:   02/08/24 67.4 kg (148 lb 9.4 oz)         Body mass index is 21.32 kg/m².    Estimated Creatinine Clearance: 150 mL/min (based on SCr of 0.5 mg/dL).      Assessment/Plan:  Vancomycin level is therapeutic.  Will adjust dosing to 1000mg q8h to decrease risk of renal toxicity  Bayesian Modeling predicts an AUC of 440 mg/L*hr and trough of 11.1 mg/L.  A vancomycin random level has been ordered on 2/10 at 0600 for follow-up.  Changes in regimen will be determined based on culture results, renal function, and clinical response.  Pharmacy will continue to monitor and adjust regimen as necessary.    Thank you for the consult,    Yvette Hoyos, PharmD, BCPS    
    Clinical Pharmacy Note: Pharmacy to Dose Vancomycin    Vancomycin Day: 4  Indication: sepsis  Current Dose: 1g q8h  Dosing Method: Bayesian Modeling    Random: 15.7 ug/mL    Recent Labs     02/08/24  2247 02/09/24  0522   BUN 11 10       Recent Labs     02/08/24  2247 02/09/24  0522   CREATININE 0.6* <0.5*       Recent Labs     02/08/24  0651 02/09/24  0522   WBC 77.4* 77.1*         Intake/Output Summary (Last 24 hours) at 2/9/2024 0730  Last data filed at 2/9/2024 0114  Gross per 24 hour   Intake 120 ml   Output 350 ml   Net -230 ml         Ht Readings from Last 1 Encounters:   02/08/24 1.778 m (5' 10\")        Wt Readings from Last 1 Encounters:   02/08/24 67.4 kg (148 lb 9.4 oz)         Body mass index is 21.32 kg/m².    Estimated Creatinine Clearance: 150 mL/min (based on SCr of 0.5 mg/dL).      Assessment/Plan:  Vancomycin level is therapeutic.  Will continue dosing of 1000mg q8h   Bayesian Modeling predicts an AUC of 463 mg/L*hr and trough of 11.6 mg/L.  A vancomycin random level has been ordered on 2/13 at 0600 for follow-up.  Changes in regimen will be determined based on culture results, renal function, and clinical response.  Pharmacy will continue to monitor and adjust regimen as necessary.    Thank you for the consult,    Sandra Marina, PharmD  PGY-1 Pharmacy Resident  [id number]        
    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 lymph nodes  Immunocompromised-has been started on chemotherapy with Taxol, carboplatin and pembrolizumab on 1/25/2024    Microbiology:        I have reviewed allavailable micro lab data and cultures    Blood culture (2/2) - collected on 2/7/2024: In process    Antibiotics and immunizations:       Current antibiotics: All antibiotics and their doses were reviewed by me    Recent Abx Admin                     vancomycin (VANCOCIN) 1,000 mg in sodium chloride 0.9 % 250 mL IVPB ([id number]) (mg) 1,000 mg New Bag 02/09/24 0842    metroNIDAZOLE (FLAGYL) tablet 500 mg (mg) 500 mg Given 02/09/24 0535     500 mg Given 02/08/24 2217     500 mg Given  1355    vancomycin (VANCOCIN) 1,750 mg in sodium chloride 0.9 % 500 mL IVPB (mg) 1,750 mg New Bag 02/08/24 2014    levoFLOXacin (LEVAQUIN) 750 MG/150ML infusion 750 mg (mg) 750 mg New Bag 02/08/24 1402                      Immunization History: All immunization history was reviewed by me today.    Immunization History   Administered Date(s) Administered    Influenza, FLUCELVAX, (age 6 mo+), MDCK, PF, 0.5mL 12/13/2022, 11/20/2023    Pneumococcal, PCV20, PREVNAR 20, (age 6w+), IM, 0.5mL 01/15/2024       Known drug allergies:     All allergies were reviewed and updated    Allergies   Allergen Reactions    Codeine Other (See Comments)     Patient does not recall reaction.    Norco 
    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                      Immunization History: All immunization history was reviewed by me today.    Immunization History   Administered Date(s) Administered    Influenza, FLUCELVAX, (age 6 mo+), MDCK, PF, 0.5mL 12/13/2022, 11/20/2023    Pneumococcal, PCV20, PREVNAR 20, (age 6w+), IM, 0.5mL 01/15/2024       Known drug allergies:     All allergies were reviewed and updated    Allergies   Allergen Reactions    Codeine Other (See Comments)     Patient does not recall reaction.    Norco [Hydrocodone-Acetaminophen] Rash       Social history:     Social History:  All social andepidemiologic history was reviewed and updated by me today as needed.     Tobacco use:   reports that he has quit smoking. His smoking use included cigarettes. He has a 40.0 pack-year smoking history. He has never used smokeless tobacco.  Alcohol use:   reports that he does not currently use alcohol after a past usage of about 3.0 - 6.0 
    MD Luciano Huerta MD Samir Brahmbhatt, MD               Office: [phone]                      Fax: [phone]             [facility]                   NEPHROLOGY INPATIENT PROGRESS NOTE:     PATIENT NAME: Sarabjit Novoa  : 1965   MRN: [id number]         RECOMMENDATIONS:-Refer to the orders    Work up:  - Serum Osm , UOsm , Feng , Urine K, Uric acid,  -likely suggestive of mixed picture, with SIADH with some hypovolemia at this time.   - renal function stable  - other lytes stable  - BP not very low, unlikely AI  -proBNP mildly elevated only    - Hypoalbuminemia-suggestive of decreased solute intake, protein load,  - TSH    -Will order         Management:  - Monitor Serum Na      Hold further IV fluids  Due to slight overcorrection, ->  started   D5, and to keep sodium goal : high 120s-low 130s over next 24 hours.    -Now at goal, D5 has been stopped     Close neurological monitoring and sodium trend monitoring     -Antibiotics per primary team  - Oncology, follow-up    - Minimize use any hypotonic/isotonic fluids such as D5W, NS, LR with other medications/drips ,   - Concentrate continuous drip fluids as much as possible,   - Avoid IVF , unless needed for resuscitation/hypovolemia w/ hypotension+tachycardia,      - minimize SSRI, NSAIDs use    - Strict I/O with daily weights.  -Monitor neurological status closely  --- Call us urgently if any/worsening neurological findings.       Discharge plans from renal standpoint- expect 1-2 days  whenever Na ~130s, stable w/o neurological s/s       IMPRESSION:       Admitted on  2024  for:  Hyponatremia [E87.1]  Immunocompromised (HCC) [D84.9]  On antineoplastic chemotherapy [Z79.899]  Septic shock (HCC) [A41.9, R65.21]  Acute on chronic respiratory failure with hypoxia (HCC) [J96.21]  Anemia, unspecified type [D64.9]       Hyponatremia :   Moderate - severe range  Mildly-moderately symptomatic,   Likely 
    MD Luciano Huerta MD Samir Brahmbhatt, MD               Office: [phone]                      Fax: [phone]             [employer]                   NEPHROLOGY INPATIENT PROGRESS NOTE:     PATIENT NAME: Sarabjit Novoa  : 1965   MRN: [id number]         RECOMMENDATIONS:-Refer to the orders    Work up:  - Serum Osm , UOsm , Feng , Urine K, Uric acid,  -likely suggestive of mixed picture, with SIADH with some hypovolemia at this time.   - renal function stable  - other lytes stable  - BP not very low, unlikely AI  -proBNP mildly elevated only    - Hypoalbuminemia-suggestive of decreased solute intake, protein load,  - TSH    -Will order         Management:  - Monitor Serum Na    -sodium goal : high 120s-low 130s over next 24 hours.    -Hold further IV fluids  -Due to slight overcorrection, ->  started  D5: now stopped     - Close neurological monitoring and sodium trend monitoring     -Antibiotics per primary team  - Oncology, follow-up    - Minimize use any hypotonic/isotonic fluids such as D5W, NS, LR with other medications/drips ,   - Concentrate continuous drip fluids as much as possible,   - Avoid IVF , unless needed for resuscitation/hypovolemia w/ hypotension+tachycardia,      - minimize SSRI, NSAIDs use    - Strict I/O with daily weights.  -Monitor neurological status closely  --- Call us urgently if any/worsening neurological findings.     -Other management as per primary team and other team    Discharge plans from renal standpoint- expect 1-2 days  whenever Na ~130s, stable w/o neurological s/s       IMPRESSION:       Admitted on  2024  for:  Hyponatremia [E87.1]  Immunocompromised (HCC) [D84.9]  On antineoplastic chemotherapy [Z79.899]  Septic shock (HCC) [A41.9, R65.21]  Acute on chronic respiratory failure with hypoxia (HCC) [J96.21]  Anemia, unspecified type [D64.9]       Hyponatremia :   Moderate - severe range  Mildly-moderately 
    PULMONARY AND CRITICAL CARE MEDICINE PROGRESS NOTE      SUBJECTIVE: Patient still has some confusion but is getting more alert.    REVIEW OF SYSTEMS:   CONSTITUTIONAL SYMPTOMS: The patient denies fever, fatigue, night sweats, weight loss or weight gain.   HEENT: No vision changes. No tinnitus, Denies sinus pain. No hoarseness, or dysphagia.   CARDIOVASCULAR: Denies chest pain, palpitation, syncope.  RESPIRATORY: Denies shortness of breath or cough.   GASTROINTESTINAL: Denies nausea, abdominal pain or change in bowel function.  SKIN: No rashes or itching.   MUSCULOSKELETAL: Denies weakness or bone pain.   NEUROLOGICAL: No headaches or seizures.     MEDICATIONS:      vancomycin  1,000 mg IntraVENous Q8H    sennosides-docusate sodium  1 tablet Oral Daily    sodium chloride flush  5-40 mL IntraVENous 2 times per day    lactobacillus  1 capsule Oral BID WC    enoxaparin  40 mg SubCUTAneous Daily    famotidine  20 mg Oral BID    mirtazapine  15 mg Oral Nightly    oxyCODONE  5 mg Oral Q4H    levofloxacin  750 mg IntraVENous Q24H    metroNIDAZOLE  500 mg Oral 3 times per day      sodium chloride 25 mL (02/09/24 0841)     bisacodyl, sodium chloride flush, sodium chloride, ondansetron, acetaminophen **OR** acetaminophen, albuterol, HYDROmorphone     ALLERGIES:   Allergies as of 02/07/2024 - Fully Reviewed 02/07/2024   Allergen Reaction Noted    Codeine Other (See Comments) 10/28/2000    Norco [hydrocodone-acetaminophen] Rash 11/29/2023        OBJECTIVE:   height is 1.778 m (5' 10\") and weight is 67 kg (147 lb 12.7 oz). His oral temperature is 97.6 °F (36.4 °C). His blood pressure is 106/68 and his pulse is 112 (abnormal). His respiration is 16 and oxygen saturation is 93%.   No intake/output data recorded.     PHYSICAL EXAM:  CONSTITUTIONAL: He is a 58 y.o.-year-old who appears his stated age. He is in no acute distress.   CARDIOVASCULAR: S1 S2 RRR. Without murmer  RESPIRATORY & CHEST: Lungs are clear to auscultation and 
   02/12/24 2005   Incentive Spirometry Tx   Treatment Effort Subsequent;Assisted by RT   Predicted Volume 730   Achieved Volume (mL) 1000 mL       
  PALLIATIVE MEDICINE PROGRESS NOTE     Patient name:Sarabjit Novoa    MRN:[id number] :1965  Room/Bed:N-5569/5569-01    LOS: 6 days        ASSESSMENT/RECOMMENDATIONS   58 y.o. male with metastatic lung cancer with AMS and weight loss        Symptom Management:  Metastatic lung cancer- plan was to attempt second cycle of chemo tomorrow family has opted to stop tx and switch to comfort care    Weight loss- Pt weight has dropped this admission .   AMS- pt is barely arousing today despite no narcotics for 24 hours.    Goals of Care- DNRCC. Talked to pt and spouse they have decided to not pursue ant additional chemo therapy and switch focus to comfort with HOC. They are awaiting Hospice RN to meet and work on transferring to [facility].      Patient/Family Goals of Care :      DNRCCA. Talked to pt and spouse pt reports that he is in the hospital more than he's at home he is confused and continues to hallucinate this am but is stating that if this is what his life is going to be like he doesn't want Tx. Talked to spouse by phone she states that pt told her yesterday that he's not giving up on her, she lost her mother and her best friend in the last 6 months. Pt has had just 1 treatment and it is early on to gauge tolerability or treatment effect on cancer. Wife agreeable to have palliative care to follow at [facility] to help manage side effects through cancer Tx.      DNRCC. Talked to pt and spouse they have decided to not pursue ant additional chemo therapy and switch focus to comfort with HOC. They are awaiting Hospice RN to meet and work on transferring to [facility].      Disposition/Discharge Plan:   An outpatient Palliative Care referral to [facility] was ordered for post-discharge to followup with the patient once they return home.     Advance Directives:        The patient has appointed the following active healthcare agents:  Sunil-spouse      The Patient has the following current code status:    Code Status: 
 offered pastoral presence and prayer with patient and his sister.   
Assessment complete. VSS. Patient resting in bed.Wife at bedside and all questions and concerns addressed.  Respirations even and easy. Call light in reach. Fall precautions in place. No needs expressed at this time. Will continue to monitor.    
Consent for blood transfusion signed and placed on chart. As patient is confused, consent received from wife, Linda, using two person consent via phone. Signed by this RN and Luann CARR RN.   
Consent obtained for PICC placement.   
Facility/Department: [address]  SLP Clinical Swallow Evaluation and Speech Language Cognitive Assessment     Patient: Sarabjit Novoa   : 1965   MRN: [id number]      Evaluation Date: 2/10/2024      Admitting Dx: Hyponatremia [E87.1]  Immunocompromised (HCC) [D84.9]  On antineoplastic chemotherapy [Z79.899]  Septic shock (HCC) [A41.9, R65.21]  Acute on chronic respiratory failure with hypoxia (HCC) [J96.21]  Anemia, unspecified type [D64.9]  Pain: Did not state                                  H&P: 58 y.o. male who presented to [facility] with past medical history of hypertension, hyperlipidemia, non-small cell carcinoma lung, hypertension, hyperlipidemia, arthritis presented to the ED with chief complaint of hallucinations and encephalopathy     Patient was at home,.  No family at bedside,.  Patient does not only member what occurred however on chart review patient was with his wife when she noticed him having encephalopathy with hallucinations <sent to the ED for further evaluation.  Patient also on chart review noted to have fever and hypoxia resulting to being on increased nasal cannula.  Patient does report having chronic shortness of breath and reports that the nasal cannula has been there prior to arrival and normally is on 1 to 2 L.  Patient otherwise denies having any chest pain abdominal pain or dysuria.  Patient denied having any diarrhea or increased urination.  Patient does admits to having recently started chemotherapy 1 week ago.On chart review, patient was seen by oncology on 2022 with diagnosis of stage IV non-small cell carcinoma and tolerating chemo.  Patient was discharged on 2024.    Imaging:  CT Chest: 1. No evidence of central pulmonary embolism in the main pulmonary arteries.  The peripheral vessels are not adequately opacified for evaluation.  2. Multiple lung masses and nodules, the largest in the right upper lobe  measures 8.4 x 9 cm, with areas of 
Facility/Department: [address] ONCOLOGY  Speech Language Pathology   Dysphagia and Speech Language/Cognitive Treatment Note    Patient: Sarabjit Novoa   : 1965   MRN: [id number]      Evaluation Date: 2024      Admitting Dx: Hyponatremia [E87.1]  Immunocompromised (HCC) [D84.9]  On antineoplastic chemotherapy [Z79.899]  Septic shock (HCC) [A41.9, R65.21]  Acute on chronic respiratory failure with hypoxia (HCC) [J96.21]  Anemia, unspecified type [D64.9]  Treatment Diagnosis: Cognitive-Linguistic Deficits , Speech Language Deficits , Oropharyngeal Dysphagia   Pain: Denies                                                Subjective:  Pt alert and agreeable to tx. Oriented to self only. Sister at bedside.     Dysphagia Treatment:   Diet and Treatment Recommendations 2024:  Diet Solids Recommendation:  NPO  Liquid Consistency Recommendation:   Allow sips of thin water/cup for pleasure Recommended form of Meds: Meds crushed as able in puree  or Meds via alternative means    If aggressive means of care are desired would recommend completion of swallow study prior to diet advance. If comfort care measures are desired would recommend diet liberalization per MD/     Compensatory strategies: Aspiration Precautions     Assessment of Texture Tolerance:  Diet level prior to treatment: Dysphagia III Soft and bite sized , Thin liquids, No straws   Tolerance of Current Diet Level:Report of pt demonstrating s/s of aspiration with intake Poor diet tolerance reported      -Impressions: Pt was positioned Upright in bed , awake and alert. Currently on  3.5L O2 via nasal cannula . Trials of thin liquids, mildly (nectar) thick liquids , and puree  were provided to assess swallow function. Pt demonstrated decreased labial seal, reduced bolus manipulation, suspected premature bolus loss, suspected decreased laryngeal elevation and s/s of impaired pharyngeal clearing. Pt demonstrates s/s of impaired pharyngeal clearing and/or 
Facility/Department: [address] ONCOLOGY  Speech Language Pathology   Dysphagia and Speech Language/Cognitive Treatment Note    Patient: Sarabjit Novoa   : 1965   MRN: [id number]      Evaluation Date: 2024      Admitting Dx: Hyponatremia [E87.1]  Immunocompromised (HCC) [D84.9]  On antineoplastic chemotherapy [Z79.899]  Septic shock (HCC) [A41.9, R65.21]  Acute on chronic respiratory failure with hypoxia (HCC) [J96.21]  Anemia, unspecified type [D64.9]  Treatment Diagnosis: Cognitive-Linguistic Deficits , Speech Language Deficits , Oropharyngeal Dysphagia   Pain: Denies                                                Subjective:  Nurse reports significant coughing with po intake all day today    Dysphagia Treatment:   Diet and Treatment Recommendations 2024:  Diet Solids Recommendation:  NPO  Liquid Consistency Recommendation:   Allow sips of thin water/cup for pleasure Recommended form of Meds: Meds crushed as able in puree  or Meds via alternative means      Recommend completion of Modified Barium Swallow study to further assess pharyngeal phase of swallow IF aggressive treatment intervention is desired    Compensatory strategies: Aspiration Precautions     Assessment of Texture Tolerance:  Diet level prior to treatment: Dysphagia III Soft and bite sized , Thin liquids, No straws   Tolerance of Current Diet Level:Report of pt demonstrating s/s of aspiration with intake Poor diet tolerance reported      -Impressions: Pt was positioned Upright in bed , awake and alert. Currently on  3.5L O2 via nasal cannula . Trials of thin liquids, mildly (nectar) thick liquids , and puree  were provided to assess swallow function. Poor bolus control with suspected premature bolus loss to pharynx noted with all textures. Clinical symptoms of pharyngeal pooling, delayed swallow initiation, reduced laryngeal excursion via palpation and delayed/reduced pharyngeal clearing noted with all textures. Wet cough noted with 
Met with patient's bedside nurse, Linda, and charge nurse Mo and discussed patient's current condition, rapid decline. Assess patient, and he is having a terminal fever, now nonresponsive, appears to be actively dying. Called and spoke to patient's wife Linda who can not come to the bedside at the moment. We dicussed goals of care, hospice plan of care and philosophy. Wife expressed understanding, stated her mother passed December on our services and she understands her 's prognosis and hospice care. [facility] her mother and other family members have been under our care there and she thinks highly of those providers. There is a bed open for the patient at [facility]. Consents sent to wife via [employer] and they were checked and added to the chart. Transport obtained via [facility] who do take his  [employer] [place]. Pilots, bedside staff and wife updated of transport. Packet created.        Thank you for allowing [facility] to be a part of the Bright family's care.    AGATHA Light RN  [facility] Liaison  [email or web address]  M-F [phone]  Cell: [phone]  Main: [phone]   
Metastatic non-small cell lung cancer.   Cycle 2 of Keytruda, Taxol, Carboplatin    Premeds of Aloxi and Emend followed by  Keytruda 200 mg over 30 minutes  Followed by dexamethason 20 mg   Followed by Paclitaxel 175 mg/m2 over 3 hours  Followed by Carboplatin AUC 5 over 1 hour    Feel free to reach out to me concerning chemo regimen or administration.    Osman Reynoso [facility]  [id number]      
Nutrition Note    RECOMMENDATIONS  PO Diet: Continue current diet  ONS: Continue current ONS    NUTRITION ASSESSMENT   Pt triggered for positive nutrition screen indicating wt loss and poor po intake. Hx of lung cancer with bone mets, recently started chemo. Upon visiting, reported decreased po intake since end of November with 42 lb unintentional wt loss during that same timeframe. Wt hx in EMR indicates 24 lb (14%) wt loss in less than 2 months, which is cconsidered significant. Evidence of muscle wasting and fat loss upon NFPE. Pt meets criteria for severe malnutrition. Drinks ONS at home, currently receiving Ensure TID to offer additional nutrition. RD will monitor for adequate po intake.     Nutrition Related Findings: Na 131. No BM documented. No edema noted.  Wounds: None  Nutrition Education:  Education not indicated   Nutrition Goals: PO intake 50% or greater, by next RD assessment     MALNUTRITION ASSESSMENT   Chronic Illness  Malnutrition Status: Severe malnutrition  Findings of the 6 clinical characteristics of malnutrition:  Energy Intake:  75% or less estimated energy requirements for 1 month or longer  Weight Loss:  Greater than 7.5% over 3 months (Wt hx in EMR indicates 24 lb (14%) wt loss in less than 2 months)     Body Fat Loss:  Severe body fat loss Buccal region   Muscle Mass Loss:  Severe muscle mass loss Temples (temporalis)  Fluid Accumulation:  No significant fluid accumulation     Strength:  Not Performed    NUTRITION DIAGNOSIS   Severe malnutrition related to catabolic illness as evidenced by Criteria as identified in malnutrition assessment    CURRENT NUTRITION THERAPIES  ADULT DIET; Regular  ADULT ORAL NUTRITION SUPPLEMENT; Breakfast, Lunch, Dinner; Standard High Calorie/High Protein Oral Supplement     PO Intake: Unable to assess   PO Supplement Intake:Unable to assess    ANTHROPOMETRICS  Current Height: 177.8 cm (5' 10\")  Current Weight - Scale: 67.4 kg (148 lb 9.4 oz)    Admission 
Nutrition Note    RECOMMENDATIONS  PO Diet: dysphagia soft & bite-sized   ONS: Ensure: 2 @ BKF, 1 @ PAIGE, 2 @ DIN. Magic Cup: 1 @ PAIGE      NUTRITION ASSESSMENT   Nutrition follow up: Pt continues on a dysphagia soft & bite-sized diet with Ensure.  Pt's wife is brining in Ben[employer] and added to Ensure (320 kcal, 7 g protein) TID. PO intake of meals has been variable, mostly 25% or less and wife reports dinner being his poorest meal.  Discussed changing ONS order to: two Ensure at [facility] (1 to consume at breakfast and 1 to consume as a mid morning snack). One Ensure and 1 Magic Cup at lunch. Two Ensure, one to consume with dinner and one for an HS snack.     Nutrition Related Findings: 2/10 LBM; BLE trace edema; oriented/disoriented at times  Wounds: None  Nutrition Education:  Education not indicated   Nutrition Goals: PO intake 50% or greater, by next RD assessment     MALNUTRITION ASSESSMENT   Chronic Illness  Malnutrition Status: Severe malnutrition  Findings of the 6 clinical characteristics of malnutrition:  Energy Intake:  75% or less estimated energy requirements for 1 month or longer  Weight Loss:  Greater than 7.5% over 3 months (14% in less than 2 months)     Body Fat Loss:  Severe body fat loss Buccal region   Muscle Mass Loss:  Severe muscle mass loss Temples (temporalis), Calf (gastrocnemius), Thigh (quadraceps)  Fluid Accumulation:  No significant fluid accumulation (trace BLE)     Strength:  Not Performed      NUTRITION DIAGNOSIS   Severe malnutrition related to catabolic illness as evidenced by Criteria as identified in malnutrition assessment      CURRENT NUTRITION THERAPIES  ADULT ORAL NUTRITION SUPPLEMENT; Breakfast, Lunch, Dinner, PM Snack, AM Snack; Standard High Calorie/High Protein Oral Supplement  ADULT DIET; Dysphagia - Soft and Bite Sized     PO Intake: 1-25%, 26-50%   PO Supplement Intake:%      ANTHROPOMETRICS  Current Height: 177.8 cm (5' 10\")  Current Weight - Scale: 66.5 
Patient placed on 3301 tele box. Spoke with 3A monitor tech.  
Pt A&Ox 2 on 4L NC. AM assessment and vitals completed and put into flowsheets. AM medications given with no s/s of aspiration. Pt with no questions or concerns voiced to RN at this time. Fall precautions in place and call light within reach.     
Pt increasingly confused. Taking oxygen off and getting out of bed. SpO2 89% on room air. Pt short of breath, HR and respirations are elevated on assessment. Pt educated to leave nasal cannula on and to call out for assistance before getting out of bed. Bed alarm activated.   
Pt remains confused, stating he \"has to get dressed for the restaurant.\" Bed alarm engaged, camera in room, family currently at the bedside.     Pt complaining of dry mouth and asking for diet to be changed to soft and bite sized, order placed.    Per blood bank, blood ordered is specific and has to be ordered from out of hospital. Expected delivery later today   
Pt repeatedly taking oxygen off and attempting to get out of bed. Attempted to reorient patient. Pt attempting to find his own bed and attempting to order a [employer]. Fresh ice water and ginger ale given to patient. Pt denies any further needs. Call light within reach and bed alarm on.   
Pt with increased confusion. Taking oxygen off continuously, taking tele off, keeps talking about a gun and pulling a trigger. O2 88% on RA, currently on 4L for comfort as patient is very tachypneic, SOB, and tachy.   
Pt's sister addressed some concerns about pt being NPO. Per wife Linda, its ok to discuss  and address any concerns. Pt also running a 102.1 ax temp. Given prn suppository and notified Dr. Isaac.   
Returned phone call to pt's wife, Linda. Update provided.   
Shift assessment complete. Vital signs stable. Sinus tachycardia on telemetry. Pt called this RN in room saying there was blood all over his arm. When this RN walked into room there was no blood visible to arm. Pt feels as if he is seeing things since taking pain medications. Respirations are even and unlabored. Lung sounds are diminished. Active bowel sounds noted. Pt denies any further needs at this time. Call light within reach and bed alarm on.   
Tele box 3301 changed to [id number]. 3A monitor tech notified.   
IntraVENous Once    [START ON 2/14/2024] fosaprepitant (EMEND) 150 mg in sodium chloride 0.9 % 150 mL IVPB  150 mg IntraVENous Once    [START ON 2/14/2024] dexAMETHasone  20 mg IntraVENous Once    [START ON 2/14/2024] PACLitaxel (TAXOL) 318 mg in sodium chloride 0.9 % 328 mL chemo infusion  175 mg/m2 IntraVENous Once    [START ON 2/14/2024] CARBOplatin (PARAPLATIN) 860 mg in sodium chloride 0.9 % 361 mL chemo IVPB  860 mg IntraVENous Once    lidocaine 1 % injection  5 mL IntraDERmal Once    sodium chloride flush  5-40 mL IntraVENous 2 times per day    sennosides-docusate sodium  1 tablet Oral Daily    sodium chloride flush  5-40 mL IntraVENous 2 times per day    lactobacillus  1 capsule Oral BID WC    enoxaparin  40 mg SubCUTAneous Daily    famotidine  20 mg Oral BID    mirtazapine  15 mg Oral Nightly    oxyCODONE  5 mg Oral Q4H     PRN Meds: sodium chloride flush, sodium chloride, hydrOXYzine pamoate, sodium chloride, bisacodyl, sodium chloride flush, sodium chloride, ondansetron, acetaminophen **OR** acetaminophen, albuterol, HYDROmorphone      Intake/Output Summary (Last 24 hours) at 2/12/2024 1131  Last data filed at 2/12/2024 1016  Gross per 24 hour   Intake 10 ml   Output 775 ml   Net -765 ml         Physical Exam Performed:    /73   Pulse (!) 112   Temp 99.3 °F (37.4 °C) (Oral)   Resp 18   Ht 1.778 m (5' 10\")   Wt 66.5 kg (146 lb 8 oz)   SpO2 93%   BMI 21.02 kg/m²     General appearance: No apparent distress, appears stated age and cooperative.  Thin.  HEENT: Pupils equal, round, and reactive to light. Conjunctivae/corneas clear.  NC present.  Neck: Supple, with full range of motion. No jugular venous distention. Trachea midline.  Respiratory:  Normal respiratory effort. Clear to auscultation, bilaterally without Rales/Wheezes/Rhonchi.  Cardiovascular: Regular rate and rhythm with normal S1/S2 without murmurs, rubs or gallops. No peripheral edema.  Abdomen: Soft, non-tender, non-distended with 
state,  Lung cancer with metastatic to bone  Respiratory failure,    - Patient is at very risk of developing Osmotic Demyelination Syndrome.    - Call us urgently if any/worsening neurological findings.         Work up:  - Serum Osm , UOsm , Feng , Urine K, Uric acid,  -likely suggestive of mixed picture, with SIADH with some hypovolemia at this time.   - renal function stable  - other lytes stable  - BP not very low, unlikely AI  -proBNP mildly elevated only    - Hypoalbuminemia-suggestive of decreased solute intake, protein load,  - TSH    -Will order         Other major problems: Management per primary and other consulting teams.   //         Hospital Problems             Last Modified POA    * (Principal) Acute on chronic respiratory failure with hypoxia (HCC) 2/7/2024 Yes    Hyperlipidemia 2/8/2024 Yes    Primary hypertension 2/8/2024 Yes    Malignant neoplasm of right lung (HCC) 2/8/2024 Yes    Metastatic cancer to bone (HCC) 2/8/2024 Yes    Severe malnutrition (HCC) (Chronic) 2/12/2024 Yes    Hyponatremia 2/8/2024 Yes    Acute metabolic encephalopathy 2/8/2024 Yes    Sepsis (HCC) 2/8/2024 Yes    Anemia 2/8/2024 Yes    Immunocompromised (HCC) 2/8/2024 Yes    On antineoplastic chemotherapy 2/8/2024 Yes    Elevated procalcitonin 2/8/2024 Yes    Elevated brain natriuretic peptide (BNP) level 2/8/2024 Yes    Lung mass 2/8/2024 Yes    Ex-heavy cigarette smoker (20-39 per day) 2/8/2024 Yes    Lactic acidosis 2/8/2024 Yes    ABLA (acute blood loss anemia) 2/10/2024 Yes   : other supportive care :   - Check daily renal function panel with electrolytes-phosphorus  - Strict monitoring of I/Os, daily weight  - non-Renal feeds/diet  - Current medications reviewed.        Please refer to the orders.   High Complexity. Multiple complex problems.  Discussed with patient s  treatment team-  nurse,  Jose Daniel Isaac DO   Time spent included that included face-to-face meeting/discussion with patient, patient's family-as 
  02/10/24 2113 -- -- -- -- 22 -- --   02/10/24 2042 124/80 98.6 °F (37 °C) Oral (!) 113 24 94 % --   02/10/24 1930 113/70 98.6 °F (37 °C) Oral (!) 109 22 93 % --   02/10/24 1917 -- -- -- -- -- 92 % --   02/10/24 1916 122/76 98.1 °F (36.7 °C) Oral (!) 111 24 (!) 89 % --   02/10/24 1830 105/71 98.2 °F (36.8 °C) Oral (!) 114 26 91 % --   02/10/24 1712 -- -- -- (!) 125 -- -- --   02/10/24 1631 -- -- -- -- 28 -- --   02/10/24 1610 121/74 98.2 °F (36.8 °C) Oral (!) 129 (!) 32 93 % --   02/10/24 1555 118/75 98.7 °F (37.1 °C) Oral (!) 126 28 94 % --   02/10/24 1551 122/81 98.5 °F (36.9 °C) Oral (!) 127 28 94 % --   02/10/24 1513 -- -- -- (!) 135 -- -- --   02/10/24 1512 (!) 147/88 98.2 °F (36.8 °C) Oral (!) 134 30 92 % --   02/10/24 1438 -- -- -- -- -- 92 % --   02/10/24 1437 -- -- -- (!) 135 -- (!) 88 % --   02/10/24 1305 -- -- -- (!) 113 -- -- --   02/10/24 1219 111/73 98.4 °F (36.9 °C) Oral (!) 112 -- 91 % --   02/10/24 1149 -- -- -- (!) 112 -- -- --         Intake/Output Summary (Last 24 hours) at 2/11/2024 1142  Last data filed at 2/11/2024 0904  Gross per 24 hour   Intake 1314 ml   Output 900 ml   Net 414 ml           Physical exam:   General: No acute distress,  awake,   HENT: Atraumatic, normocephalic   Eyes: Normal conjunctiva, Non-incteral sclera.   Neck: Supple, JVD not visible.   CVS: S1-S2 present,. No loud murmur.    RS: Normal respiratory effort, Breat sound: diminished at bases.     Abd: Soft , bowel sounds are normal, no distension   Skin: No rash , some bruises,   CNS: Awake dis- Oriented   Extremities/MSK:  Edema, n      =======================================================================================     DATA:  Diagnostic tests reviewed by me for today's visit:   (AS NEEDED FOR MY EVALUATION AND MANAGEMENT).       Recent Labs     02/09/24  0522 02/10/24  0544 02/10/24  1937 02/11/24  0646   WBC 77.1* 85.0*  --  94.9*   HCT 22.4* 22.6* 26.6* 25.9*    331  --  281       Iron Saturation:  No 
suspected or confirmed  emergency medical condition->Emergency Medical Condition (MA)  Reason for Exam: hallucinations recent dx of lung cancer    FINDINGS:  BRAIN/VENTRICLES: Motion artifact degrades image quality.  There is no acute  intracerebral hemorrhage or extra-axial fluid collection.  There is mild  cerebral atrophy with mild periventricular and deep white matter small vessel  ischemic disease.    ORBITS: The orbits are unremarkable.    SINUSES: Mild mucosal hypertrophy involves the right maxillary sinus.    SOFT TISSUES/SKULL:  The calvarium is intact.    There is an incompletely imaged large right right posterior cervical mass.  In addition, there is an incompletely imaged right cervical lymph node  measuring 1.6 x 1.9 cm concerning for tello metastasis.  No change in the 1.2  x 1.5 cm left parotid mass.  Impression: 1. No acute intracranial abnormality.  2. 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  XR CHEST PORTABLE  Narrative: EXAMINATION:  ONE XRAY VIEW OF THE CHEST    2/7/2024 4:40 pm    COMPARISON:  Chest x-ray dated January 22, 2024, CT chest dated January 23, 2024    HISTORY:  ORDERING SYSTEM PROVIDED HISTORY: Sepsis  TECHNOLOGIST PROVIDED HISTORY:  Reason for exam:->Sepsis    FINDINGS:  Stable appearance of the known right apex lung mass.  Stable appearance of  bilateral pulmonary nodules and right lower lobe pleural effusion.  No  pneumothorax.  Stable cardiomediastinal silhouette  Impression: Stable exam with known right upper lobe lung mass and scattered pulmonary  nodules and small right pleural effusion      Problem List  Patient Active Problem List   Diagnosis    Erectile dysfunction    Hyperlipidemia    Impaired glucose tolerance    Psychosexual dysfunction with inhibited sexual excitement    GENEVIEVE (generalized anxiety disorder)    Nicotine dependence with current use    Primary hypertension    Spondylosis of lumbar region without myelopathy or radiculopathy    Arthritis 
results found for: \"ANATITER\", \"BETTY\"  RF:  No results found for: \"RF\"  DSDNA:  No components found for: \"DNA\"  AMYLASE:  No results found for: \"AMYLASE\"  LIPASE:  No results found for: \"LIPASE\"  Fibrinogen Level:  No components found for: \"FIB\"       BELOW MENTIONED RADIOLOGY STUDY RESULTS BY ME (AS NEEDED FOR MY EVALUATION AND MANAGEMENT).     CT CHEST PULMONARY EMBOLISM W CONTRAST    Result Date: 2/7/2024  EXAMINATION: CTA OF THE CHEST 2/7/2024 10:28 pm TECHNIQUE: CTA of the chest was performed after the administration of intravenous contrast.  Multiplanar reformatted images are provided for review.  MIP images are provided for review. Automated exposure control, iterative reconstruction, and/or weight based adjustment of the mA/kV was utilized to reduce the radiation dose to as low as reasonably achievable. COMPARISON: CT chest dated 01/23/2020 for HISTORY: ORDERING SYSTEM PROVIDED HISTORY: assess for PE or PNA TECHNOLOGIST PROVIDED HISTORY: Reason for exam:->assess for PE or PNA Additional Contrast?->1 Reason for Exam: assess for PE or PNA Relevant Medical/Surgical History: Altered Mental Status (Pt via EMS from home, wife called due to hallucinations and being altered, pt started chemo last week for lung cancer with mets to the bone, temp 100.5, had pneumonia recently with an admission, oxygen 89 on home 1 L) FINDINGS: Pulmonary Arteries: Main and central pulmonary arteries are adequately opacified for evaluation, the peripheral vessels are not adequately opacified for evaluation.  No evidence of intraluminal filling defect in the main pulmonary arteries to suggest pulmonary embolism.  Main pulmonary artery is normal in caliber. Mediastinum: There is mediastinal and hilar lymphadenopathy.  Multiple masses are seen in the right lower neck, the largest is posteriorly measuring 12 cm. The lymph node in the right paratracheal region measures 4.4 cm..  The heart and pericardium demonstrate no acute abnormality.

## 2024-02-13 NOTE — PLAN OF CARE
Problem: Discharge Planning  Goal: Discharge to home or other facility with appropriate resources  2/13/2024 1739 by Guerda Franco RN  Outcome: Completed  2/13/2024 1454 by Guerda Franco RN  Outcome: Progressing     Problem: Pain  Goal: Verbalizes/displays adequate comfort level or baseline comfort level  2/13/2024 1739 by Guerda Franco RN  Outcome: Completed  2/13/2024 1454 by Guerda Franco RN  Outcome: Progressing     Problem: Safety - Adult  Goal: Free from fall injury  2/13/2024 1739 by Guerda Franco RN  Outcome: Completed  2/13/2024 1454 by Guerda Franco RN  Outcome: Progressing     Problem: ABCDS Injury Assessment  Goal: Absence of physical injury  2/13/2024 1739 by Guerda Franco RN  Outcome: Completed  2/13/2024 1454 by Guerda Franco RN  Outcome: Progressing     Problem: Decision Making  Goal: Pt/Family able to effectively weigh alternatives and participate in decision making related to treatment and care  Description: INTERVENTIONS:  1. Determine when there are differences between patient's view, family's view, and healthcare provider's view of condition  2. Facilitate patient and family articulation of goals for care  3. Help patient and family identify pros/cons of alternative solutions  4. Provide information as requested by patient/family  5. Respect patient/family right to receive or not to receive information  6. Serve as a liaison between patient and family and health care team  7. Initiate Consults from Ethics, Palliative Care or initiate Family Care Conference as is appropriate  2/13/2024 1739 by Guerda Franco RN  Outcome: Completed  2/13/2024 1454 by Guerda Franco RN  Outcome: Progressing     Problem: Respiratory - Adult  Goal: Achieves optimal ventilation and oxygenation  2/13/2024 1739 by Guerda Franco RN  Outcome: Completed  2/13/2024 1454 by Guerda Franco RN  Outcome: Progressing     Problem: Nutrition Deficit:  Goal: Optimize nutritional status  2/13/2024 1739 by

## 2024-02-13 NOTE — DISCHARGE INSTR - COC
Continuity of Care Form    Patient Name: Sarabjit Novoa   :  1965  MRN:  [id number]    Admit date:  2024  Discharge date:  24    Code Status Order: Limited   Advance Directives:     Admitting Physician:  Anthony Villagomez DO  PCP: Markos Quinn MD    Discharging Nurse: MISBAH Maravilla  Discharging Hospital Unit/Room#: 5TN-5569/5569-01  Discharging Unit Phone Number: [phone]    Emergency Contact:   Extended Emergency Contact Information  Primary Emergency Contact: Linda Larry  Home Phone: [phone]  Relation: Spouse  Secondary Emergency Contact: Breezy Novoa  Mobile Phone: [phone]  Relation: Child   needed? No    Past Surgical History:  Past Surgical History:   Procedure Laterality Date    CT NEEDLE BIOPSY LUNG PERCUTANEOUS  2023    CT NEEDLE BIOPSY LUNG PERCUTANEOUS 2023 [facility] CT SCAN       Immunization History:   Immunization History   Administered Date(s) Administered    Influenza, FLUCELVAX, (age 6 mo+), MDCK, PF, 0.5mL 2022, 2023    Pneumococcal, PCV20, PREVNAR 20, (age 6w+), IM, 0.5mL 01/15/2024       Active Problems:  Patient Active Problem List   Diagnosis Code    Erectile dysfunction N52.9    Hyperlipidemia E78.5    Impaired glucose tolerance R73.02    Psychosexual dysfunction with inhibited sexual excitement F52.8    GENEVIEVE (generalized anxiety disorder) F41.1    Nicotine dependence with current use F17.200    Primary hypertension I10    Spondylosis of lumbar region without myelopathy or radiculopathy M47.816    Arthritis of first metatarsophalangeal (MTP) joint of right foot M19.071    Vaccine counseling Z71.85    Malignant neoplasm of right lung (HCC) C34.91    Metastatic cancer to bone (HCC) C79.51    Bacterial pneumonia J15.9    Severe malnutrition (HCC) E43    Acute on chronic respiratory failure with hypoxia (HCC) J96.21    Hyponatremia E87.1    Acute metabolic encephalopathy G93.41    Sepsis (HCC) A41.9    Anemia D64.9    Immunocompromised

## 2024-02-14 LAB — REPORT: NORMAL

## 2024-03-18 DIAGNOSIS — M47.816 SPONDYLOSIS OF LUMBAR REGION WITHOUT MYELOPATHY OR RADICULOPATHY: ICD-10-CM

## 2024-03-18 RX ORDER — MELOXICAM 15 MG/1
15 TABLET ORAL DAILY
Qty: 90 TABLET | Refills: 0 | OUTPATIENT
Start: 2024-03-18

## 2024-07-08 ENCOUNTER — COMMUNITY OUTREACH (OUTPATIENT)
Dept: PRIMARY CARE CLINIC | Age: 59
End: 2024-07-08

## 2024-07-08 NOTE — PROGRESS NOTES
Patient's HM shows they are overdue for Colorectal Screening.   Care Everywhere and  files searched.   updated with 2012 colonoscopy.